# Patient Record
Sex: MALE | Race: WHITE | Employment: FULL TIME | URBAN - METROPOLITAN AREA
[De-identification: names, ages, dates, MRNs, and addresses within clinical notes are randomized per-mention and may not be internally consistent; named-entity substitution may affect disease eponyms.]

---

## 2018-06-14 PROBLEM — N49.2 CELLULITIS, SCROTUM: Status: ACTIVE | Noted: 2018-06-14

## 2018-06-14 PROBLEM — A41.9 SEPSIS (HCC): Status: ACTIVE | Noted: 2018-06-14

## 2018-06-14 PROBLEM — E11.65 HYPERGLYCEMIA DUE TO TYPE 2 DIABETES MELLITUS (HCC): Status: ACTIVE | Noted: 2018-06-14

## 2018-06-14 PROBLEM — L03.315 CELLULITIS, PERINEUM: Status: ACTIVE | Noted: 2018-06-14

## 2018-06-18 PROBLEM — E87.6 HYPOKALEMIA: Status: ACTIVE | Noted: 2018-06-18

## 2018-07-09 ENCOUNTER — OFFICE VISIT (OUTPATIENT)
Dept: FAMILY MEDICINE CLINIC | Age: 33
End: 2018-07-09

## 2018-07-09 VITALS
TEMPERATURE: 98.2 F | RESPIRATION RATE: 18 BRPM | DIASTOLIC BLOOD PRESSURE: 100 MMHG | OXYGEN SATURATION: 97 % | HEART RATE: 87 BPM | BODY MASS INDEX: 44.1 KG/M2 | SYSTOLIC BLOOD PRESSURE: 140 MMHG | WEIGHT: 315 LBS | HEIGHT: 71 IN

## 2018-07-09 DIAGNOSIS — A41.9 SEPSIS, DUE TO UNSPECIFIED ORGANISM: ICD-10-CM

## 2018-07-09 DIAGNOSIS — E11.65 TYPE 2 DIABETES MELLITUS WITH HYPERGLYCEMIA, WITHOUT LONG-TERM CURRENT USE OF INSULIN (HCC): Primary | ICD-10-CM

## 2018-07-09 DIAGNOSIS — E87.6 HYPOKALEMIA: ICD-10-CM

## 2018-07-09 DIAGNOSIS — I10 ESSENTIAL HYPERTENSION: ICD-10-CM

## 2018-07-09 DIAGNOSIS — L03.315 CELLULITIS, PERINEUM: ICD-10-CM

## 2018-07-09 DIAGNOSIS — N49.2 CELLULITIS, SCROTUM: ICD-10-CM

## 2018-07-09 DIAGNOSIS — Z79.4 TYPE 2 DIABETES MELLITUS WITH HYPERGLYCEMIA, WITH LONG-TERM CURRENT USE OF INSULIN (HCC): Primary | ICD-10-CM

## 2018-07-09 DIAGNOSIS — E11.65 TYPE 2 DIABETES MELLITUS WITH HYPERGLYCEMIA, WITH LONG-TERM CURRENT USE OF INSULIN (HCC): Primary | ICD-10-CM

## 2018-07-09 DIAGNOSIS — E11.9 DIABETES MELLITUS, TYPE 2 (HCC): ICD-10-CM

## 2018-07-09 PROBLEM — E66.01 OBESITY, MORBID (HCC): Status: ACTIVE | Noted: 2018-07-09

## 2018-07-09 RX ORDER — LISINOPRIL 10 MG/1
10 TABLET ORAL DAILY
Qty: 30 TAB | Refills: 2 | Status: SHIPPED | OUTPATIENT
Start: 2018-07-09 | End: 2018-08-07 | Stop reason: SDUPTHER

## 2018-07-09 RX ORDER — INSULIN GLARGINE 100 [IU]/ML
20 INJECTION, SOLUTION SUBCUTANEOUS
Qty: 15 ML | Refills: 3 | Status: SHIPPED | OUTPATIENT
Start: 2018-07-09 | End: 2018-09-11 | Stop reason: SDUPTHER

## 2018-07-09 RX ORDER — PEN NEEDLE, DIABETIC 30 GX3/16"
NEEDLE, DISPOSABLE MISCELLANEOUS
Qty: 50 PEN NEEDLE | Refills: 11 | Status: SHIPPED | OUTPATIENT
Start: 2018-07-09 | End: 2018-09-11 | Stop reason: SDUPTHER

## 2018-07-09 NOTE — PATIENT INSTRUCTIONS

## 2018-07-09 NOTE — PROGRESS NOTES
Chief Complaint   Patient presents with    Groin Pain    Rapid Heart Rate     Patient is here today for hospital F/U due to groin pain and rapid heart rate. 1. Have you been to the ER, urgent care clinic since your last visit? Hospitalized since your last visit? Yes [facility] to due to groin pain 6/18    2. Have you seen or consulted any other health care providers outside of the [address] [place] [address] since your last visit? Include any pap smears or colon screening.  No

## 2018-07-09 NOTE — PROGRESS NOTES
Mr. Laura Lyon is a 34y.o. year old male, he is seen today for hospital follow up he was admitted on 6/14/18, discharged on 6/26/18. Patient was admitted for the following: Richard Almazan a 34y.o. year old male who presents with  Skin infection. Patient is a very pleasant 31yo M with history of diabetes and stopped taking his meds more than 2 yrs ago. Over past week and a half he had colds, cough, took OTC meds. Over past 3 days he had pain and swelling and redness on skin in groin and scrotum. Patient presented in ER tachycardic and elevated glucose. Discharge Diagnosis:      Hospital Problems  Date Reviewed: 2/25/2016    [facility]    Codes Class Noted POA     Hypokalemia ICD-10-CM: E87.6  ICD-9-CM: 276.8   6/18/2018 No           Cellulitis, scrotum ICD-10-CM: N49.2  ICD-9-CM: 501. 4   6/14/2018 Yes           Sepsis (HCC) ICD-10-CM: A41.9  ICD-9-CM: 038.9, 995.91   6/14/2018 Yes           * (Principal)Cellulitis, perineum ICD-10-CM: L03.315  ICD-9-CM: 682. 2   6/14/2018 Yes           Hyperglycemia due to type 2 diabetes mellitus ([facility] ICD-10-CM: E11.65  ICD-9-CM: 250.00         Patient states since his discharge he does a lot of walking he will have a lot pain to surgical site. Denies any heart palpitations. Comments he has a follow up with Dr. Gudelia Dong 7/16/18. States his girlfriend has been performing his dressing changes. Comments he does have drainage, has decreased but will have increased drainage with movement. Denies odor. Comments the transitional care clinic prescribed Metformin  mg(2 tabs) for his diabetes. Denies polyuria/polydipsia. Patient was previously on Lantus when he was dx with diabetes in 2014. He had lost a significant amount of weight and lantus was discontinued and he continued on Metformin. Patient states he lost his insurance and was not able to return for his follow up appts. Comments he does check his glucose with avg. Fasting 110.       Allergies   Allergen Reactions    Tylenol Cold [Qkd-Yfzbmzxmh-Hv-Acetaminophen] Swelling     Current Outpatient Prescriptions on File Prior to Visit   Medication Sig Dispense Refill    Lactobacillus Acidoph & Bulgar (FLORANEX) 1 million cell tab tablet Take 2 Tabs by mouth two (2) times a day for 14 days. 56 Tab 0    metFORMIN (GLUCOPHAGE) 500 mg tablet Take 1 tablet by mouth two (2) times daily (with meals). 180 tablet 1    Blood-Glucose Meter monitoring kit One touch mini 1 kit 0    glucose blood VI test strips (BLOOD GLUCOSE TEST) strip Check glucose twice daily; to use with one touch mini 1 Package 11    Insulin Needles, Disposable, (SURE-FINE PEN NEEDLES) 31 x 3/16 \" ndle To use with Lantus solostar. 30 each 5     No current facility-administered medications on file prior to visit. Patient Active Problem List   Diagnosis Code    Motion sickness T75. 3XXA    Kidney stone N20.0    Diabetes mellitus, type 2 ([address] E11.9    Cellulitis, scrotum N49.2    Sepsis ([address] A41.9    Cellulitis, perineum L03.315    Hyperglycemia due to type 2 diabetes mellitus ([address] E11.65    Hypokalemia E87.6    Obesity, morbid ([address] E66.01     Past Surgical History:   Procedure Laterality Date    HX KNEE ARTHROSCOPY         Review of Systems - General ROS: negative for - chills, fatigue or fever  Respiratory ROS: no cough, shortness of breath, or wheezing  Cardiovascular ROS: no chest pain or dyspnea on exertion  Genito-Urinary ROS: no dysuria, trouble voiding, or hematuria    Lab Results   Component Value Date/Time    WBC 7.7 06/24/2018 06:01 AM    HGB 13.0 06/24/2018 06:01 AM    HCT 38.6 06/24/2018 06:01 AM    PLATELET 217 30/22/5555 06:01 AM    MCV 87.5 06/24/2018 06:01 AM     Lab Results   Component Value Date/Time    Sodium 141 06/24/2018 06:01 AM    Potassium 3.6 06/24/2018 06:01 AM    Chloride 104 06/24/2018 06:01 AM    CO2 30 06/24/2018 06:01 AM    Anion gap 7 06/24/2018 06:01 AM    Glucose 102 06/24/2018 06:01 AM    BUN 8 06/24/2018 06:01 AM Creatinine 0.9 06/24/2018 06:01 AM    BUN/Creatinine ratio 16 03/23/2015 09:17 AM    GFR est AA >60 06/24/2018 06:01 AM    GFR est non-AA >60 06/24/2018 06:01 AM    Calcium 9.3 06/24/2018 06:01 AM    Bilirubin, total 0.8 06/21/2018 03:58 AM    AST (SGOT) 28 06/21/2018 03:58 AM    Alk. phosphatase 65 06/21/2018 03:58 AM    Protein, total 6.3 (L) 06/21/2018 03:58 AM    Albumin 2.1 (L) 06/21/2018 03:58 AM    A-G Ratio 2.3 09/09/2014 12:48 PM    ALT (SGPT) 52 06/21/2018 03:58 AM     Lab Results   Component Value Date/Time    Hemoglobin A1c 11.3 (H) 06/14/2018 07:30 PM    Hemoglobin A1c (POC) 10.1 09/09/2014 12:00 PM     XR Results (maximum last 3): Results from Hospital Encounter encounter on 06/14/18   XR CHEST SNGL V   Narrative Chest    Indication: sepsis  Comparison: No relevant studies. Findings:    AP portable upright view of the chest demonstrates that the cardiac silhouette  is not enlarged. There is no focal infiltrate, pleural effusion, vascular  congestion, or pneumothorax. Impression Impression:     No acute cardiopulmonary disease.         Results from Abstract encounter on 01/17/13   XR ABD (KUB)   Impression     Encounter: 12/09/12; 12/10/12; (788.20) RETENTION OF URINE  NOS, (592.1) URETERAL CALCULUS, (598.8) URETHRAL STRICTURE NEC,  (788.29) RETENTION OF URINE NEC, (599.4) URETHRAL FALSE PASSAGE,  (278.01) MORBID OBESITY; D - POLLO; Tamra Walker; DIS 12/10/12  11:3508/17/12; 08/22/12; Jorge Brandon, [address] [place] [address]; DIS 08/22/12  00:0808/14/06; 08/14/06; ; D; ; DIS 08/14/06 17:50     ([facility]) Eli Moses last refresh: 10:00          Patient Name     Room/Bed     Allergies     Weight(kg)     Height(in)    BMI     Isolation     Code Status     Primary  Language      NAYELI YU     DIS 12/10/12 11:35     TYLENOL COLD RELIEF      163.3kg     71in     50.2                 English                Radiology One Year last refresh: 10:00         Report for Del Tinsley (MRN: [id number]) Select All Tests      Check 10 on All Selected         -----------------------------------------------------------------  ---------------        Check 10 on Selected Components for ABDOMEN AP ONLY  (KUB,FLAT PLT)      TEST: ABDOMEN AP ONLY (KUB,FLAT PLT)       Collected Date & Time: 08/17/12 18:28               Result Name Results Units Reference Range       ABDOMEN AP ONLY (KUB,FLAT PLT)   RAD 4000 - ABDOMEN AP ONLY  (KU(..)            RAD 4000 - ABDOMEN AP ONLY (KUB,FLAT PLT)   -  Aug 17 2012     RESULTS:  CLINICAL HISTORY:  Right renal colic    EXAMINATION: Single view of the abdomen    CORRELATION:  CT scan 8/17/2012. FINDINGS:  Scattered air and fecal residue is seen in the colon. There are  no   dilated loops of small or large bowel. Mid to distal ureteric  stone seen   on CT scan is not clearly identified on the KUB. IMPRESSION:    [id number] ureteric stone seen on CT scan not clearly  identified on   the KUB. SMP/thang    Interpreting Physician: Piper Odom M.D. Electronically Signed  by / Date: Piper Odom M.D. Aug 17 2012  6:49P                      Results from Hospital Encounter encounter on 12/09/12   XR FLUORO GUIDE NDL PLACE   Narrative RAD 6000 - FLUOR<1 HOUR-RAD - DEC 10 2012  RESULTS:  FLUOROSCOPY IN OR  10 DECEMBER 2012. PROCEDURE:  30 SECONDS OF FLUOROSCOPY TIME WAS UTILIZED IN PERFORMING CYSTOSCOPY. NOTE: \"MD FOUND STONE IN BLADDER OBSTRUCTING URETHRA  STONE REMOVED. \"  SOLEDAD/JORDAN  IMPRESSION:  FLUOROSCOPY IN OR  10 DECEMBER 2012. PROCEDURE:  30 SECONDS OF FLUOROSCOPY TIME WAS UTILIZED IN PERFORMING CYSTOSCOPY. NOTE: \"MD FOUND STONE IN BLADDER OBSTRUCTING URETHRA  STONE REMOVED. \"  WILL  INTERPRETING PHYSICIAN: Kevin Posada M.D.  ELECTRONICALLY SIGNED  BY / DATE: Kevin Posada M.D. 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 COMPARISON: No relevant studies. TECHNIQUE:  CT of the pelvis was performed with intravenous contrast with  multiplanar reformatted images. FINDINGS:    Visualized portions of the bowel are unremarkable. Suggestion of diffuse  circumferential bladder wall thickening. The prostate is not enlarged. There is  a fat-containing right inguinal hernia. There is marked fat stranding and  induration involving the right inguinal region extending into the right scrotum. No definite abscess. No soft tissue gas. Impression IMPRESSION:    1. Inflammatory changes within the right inguinal region extending into the  right hemiscrotum. No definite abscess or soft tissue gas. 2.  Fat-containing right inguinal hernia. Results from Abstract encounter on 01/17/13   CT ABD PELV WO CONT   Impression Cindi Almazan; [id number] ; DIS 12/10/12 11:35     Encounter:  12/09/12; 12/10/12; (788.20) RETENTION OF URINE NOS, (592.1)  URETERAL CALCULUS, (598.8) URETHRAL STRICTURE NEC, (788.29)  RETENTION OF URINE NEC, (599.4) URETHRAL FALSE PASSAGE, (278.01)  MORBID OBESITY; D - POLLO; Larrie Kocher; DIS 12/10/12 11:3508/17/12;  08/22/12; Harjinder Gomez, [address] [place] [address]; DIS 08/22/12 00:0808/14/06;  08/14/06; ; D; ; DIS 08/14/06 17:50     ([facility]) Frida Cole last refresh: 9:58          Patient Name     Room/Bed     Allergies     Weight(kg)     Height(in)    BMI     Isolation     Code Status     Primary  Language      NAYELI YU     DIS 12/10/12 11:35     TYLENOL COLD RELIEF      163.3kg     71in     50.2                 English                Radiology One Year last refresh: 9:58         Report for Cindi Almazan (MRN: [id number])                         Select All Tests      Check 10 on All Selected         -----------------------------------------------------------------  ---------------        Check 10 on Selected Components for CT ABDOMEN/PELVIS W/O  CONTRAST      TEST: CT ABDOMEN/PELVIS W/O CONTRAST       Collected Date & Time: 08/17/12 17:05               Result Name Results Units Reference Range       CT ABDOMEN/PELVIS W/O CONTRAST   CAT 7160 - CT  ABDOMEN/PELVIS W(..)            CAT 7160 - CT ABDOMEN/PELVIS W/O CONTRAST   -  Aug 17 2012     RESULTS:  Clinical History: Right flank pain    Examination:   Noncontrast CT scan of the abdomen and pelvis 8 17 2012. 5 mm  axial   scanning is performed from the costophrenic angles to the  symphysis   pubis. Coronal and sagittal reconstruction imaging have been  performed. Correlation: None    Findings:  2 mm nodule left lower lobe. Mild degenerative changes of the  spine. Liver, gallbladder, spleen, pancreas and adrenal glands  unremarkable. 6 mm obstructing stone right mid to distal ureter, moderate   hydronephrosis and perirenal fat stranding. Circumferential  bladder wall   thickening is likely related to underdistention. Prostatic  calcifications   are present. Seminal vesicles are within normal limits. No stones  are   seen in the left kidney. Colon, terminal ileum, appendix, stomach and small bowel loops  are within   normal limits. Abdominal aorta and iliac vessels are  unremarkable. No   dominant enlargement or free fluid. IMPRESSION:    6 mm obstructing stone mid to distal right ureter, moderate   hydronephrosis. Interpreting Physician: Tony Madsen M.D. Electronically Signed  by / Date: Tony Madsen M.D. Aug 17 2012  5:47P                      Results from Hospital Encounter encounter on 08/17/12   CT ABD PELV WO CONT   Narrative CAT 7160 - CT ABDOMEN/PELVIS W/O CONTRAST   -  AUG 17 2012  RESULTS:  CLINICAL HISTORY: RIGHT FLANK PAIN  EXAMINATION:  NONCONTRAST CT SCAN OF THE ABDOMEN AND PELVIS 8 17 2012. 5 MM AXIAL  SCANNING IS PERFORMED FROM THE COSTOPHRENIC ANGLES TO THE SYMPHYSIS  PUBIS. CORONAL AND SAGITTAL RECONSTRUCTION IMAGING HAVE BEEN PERFORMED.   CORRELATION: NONE  FINDINGS:  2 MM NODULE LEFT LOWER LOBE. MILD DEGENERATIVE CHANGES OF THE SPINE. LIVER  GALLBLADDER  SPLEEN  PANCREAS AND ADRENAL GLANDS UNREMARKABLE.  6 MM OBSTRUCTING STONE RIGHT MID TO DISTAL URETER  MODERATE  HYDRONEPHROSIS AND PERIRENAL FAT STRANDING. CIRCUMFERENTIAL BLADDER WALL  THICKENING IS LIKELY RELATED TO UNDERDISTENTION. PROSTATIC CALCIFICATIONS  ARE PRESENT. SEMINAL VESICLES ARE WITHIN NORMAL LIMITS. NO STONES ARE  SEEN IN THE LEFT KIDNEY. COLON  TERMINAL ILEUM  APPENDIX  STOMACH AND SMALL BOWEL LOOPS ARE WITHIN  NORMAL LIMITS. ABDOMINAL AORTA AND ILIAC VESSELS ARE UNREMARKABLE. NO  DOMINANT ENLARGEMENT OR FREE FLUID. IMPRESSION:  6 MM OBSTRUCTING STONE MID TO DISTAL RIGHT URETER  MODERATE  HYDRONEPHROSIS. INTERPRETING PHYSICIAN: Hector Galeana M.D.  ELECTRONICALLY SIGNED  BY / DATE: Hector Galeana M.D. AUG 17 2012  5:47P        MRI Results (maximum last 3): No results found for this or any previous visit. US Results (maximum last 3): Results from Hospital Encounter encounter on 06/14/18   US SCROTUM/TESTICLES   Narrative Clinical history: Scrotal mass, enlarged scrotum    EXAMINATION:  Scrotal ultrasound with color duplex interrogation and spectral waveform  analysis 6/18/2018. FINDINGS:  There is diffuse scrotal wall edema. Both testicles demonstrate homogeneous  echotexture. Right testicle measures 5.3 x 2.9 x 3.1 cm. Left testicle measures  4.6 x 2.8 x 3 cm. On color-flow duplex interrogation there is blood flow to both  testicles. Right epididymis measures 1 x 1.4 cm. Left epididymis measures 0.9 x 1.4 cm. Small bilateral hydroceles. Ill-defined 2.6 x 1.3 x 2.6 cm hypoechoic area right  scrotal wall may represent phlegmon. Impression IMPRESSION:  1. No scrotal mass. 2. Diffuse scrotal wall edema with skin thickening. 3. Small bilateral pleural effusions. 4. 2.6 cm hypoechoic area subcutaneous tissues right scrotum may represent  phlegmon.           IR Results (maximum last 3): No results found for this or any previous visit. BP Readings from Last 3 Encounters:   07/09/18 (!) 140/100   06/25/18 135/82   02/25/16 132/88     Wt Readings from Last 3 Encounters:   07/09/18 347 lb (157.4 kg)   06/14/18 340 lb (154.2 kg)   02/25/16 322 lb (146.1 kg)       Objective:   Visit Vitals    BP (!) 140/100 (BP 1 Location: Right arm, BP Patient Position: Sitting)    Pulse 87    Temp 98.2 °F (36.8 °C) (Oral)    Resp 18    Ht 5' 11\" (1.803 m)    Wt 347 lb (157.4 kg)    SpO2 97%    BMI 48.4 kg/m2     General appearance: alert, cooperative, no distress, appears stated age  Lungs: clear to auscultation bilaterally  Heart: regular rate and rhythm, S1, S2 normal, no murmur, click, rub or gallop  Abdomen: soft, non-tender. Bowel sounds normal. No masses,  no organomegaly  Male genitalia: penis: no lesions or discharge, abnormal findings: scrotum enlarged and erythematous  Extremities: extremities normal, atraumatic, no cyanosis or edema  Pulses: 2+ and symmetric  Skin: right groin: superior incision with packing with small of amount of bloody drainage present, inferior incision also with packing and scant amount of bloody drainage     Assessment/Plan:    ICD-10-CM ICD-9-CM    1. Type 2 diabetes mellitus with hyperglycemia, without long-term current use of insulin ([facility]) E11.65 250.00 insulin glargine (LANTUS,BASAGLAR) 100 unit/mL (3 mL) inpn     790.29 Insulin Needles, Disposable, 31 gauge x 5/16\" ndle      LIPID PANEL      MICROALBUMIN, UR, RAND W/ MICROALB/CREAT RATIO      LIPID PANEL      MICROALBUMIN, UR, RAND W/ MICROALB/CREAT RATIO   2. Sepsis, due to unspecified organism ([facility] A41.9 038.9 CBC WITH AUTOMATED DIFF     995.91 CBC WITH AUTOMATED DIFF   3. Cellulitis, scrotum N49.2 608.4 CBC WITH AUTOMATED DIFF      CBC WITH AUTOMATED DIFF   4. Cellulitis, perineum L03.315 682.2 CBC WITH AUTOMATED DIFF      CBC WITH AUTOMATED DIFF   5.  Hypokalemia X21.7 674.6 METABOLIC PANEL, COMPREHENSIVE      METABOLIC PANEL, COMPREHENSIVE   6. Essential hypertension I10 401.9 lisinopril (PRINIVIL, ZESTRIL) 10 mg tablet      LIPID PANEL      LIPID PANEL   anticipatory guidance for above provided and reviewed  I have discussed the diagnosis with the patient and the intended plan as seen in the above orders. The patient has received an after-visit summary and questions were answered concerning future plans. I have discussed medication side effects and warnings with the patient as well. Follow-up Disposition:  Return in about 4 weeks (around 8/6/2018) for DM.

## 2018-07-09 NOTE — TELEPHONE ENCOUNTER
From: Priscilla Aguilar  To: Elyssa hKan NP  Sent: 7/9/2018 2:41 PM EDT  Subject: Medication Renewal Request    Original authorizing provider: SANJUANITA Maya People would like a refill of the following medications:  glucose blood VI test strips (BLOOD GLUCOSE TEST) strip Elyssa Khan NP]    Preferred pharmacy: [address] [place] [address]     Comment:  I have a freestyle kit with like 5 strips left.  Also have a reli on that was just given to me

## 2018-07-09 NOTE — MR AVS SNAPSHOT
Romulo Ego 
 
 
 [address] [id number] [address] [facility] [address] 
[phone] Patient: Zita Fine MRN:  [id number] Visit Information Date & Time Provider Department Dept. Phone Encounter #  
 7/9/2018  8:15 AM Janet Beal NP Aundrea Jennings [address] [place] [address] [phone] [id number] Follow-up Instructions Return in about 4 weeks (around 8/6/2018) for DM. Your Appointments 7/16/2018  1:00 PM  
New Patient with Rayna James MD  
[facility] [address] [facility]) Appt Note: 2-3 wk hosp [facility]) f/u s/p I and D scrotal abscess per Dr Mireille Berg [address] [place], [place] [id number]  
[phone]  
  
   
 [address] [id number] Upcoming Health Maintenance Date Due  
 FOOT EXAM Q1 9/9/2015 MICROALBUMIN Q1 9/9/2015 LIPID PANEL Q1 9/25/2015 EYE EXAM RETINAL OR DILATED Q1 9/30/2015 Pneumococcal 19-64 Medium Risk (1 of 1 - PPSV23) 10/3/2018* Influenza Age 5 to Adult 8/1/2018 HEMOGLOBIN A1C Q6M 12/14/2018 DTaP/Tdap/Td series (2 - Td) 11/22/2024 *Topic was postponed. The date shown is not the original due date. Allergies as of 7/9/2018  Review Complete On: 7/9/2018 By: Janet Beal NP Severity Noted Reaction Type Reactions Tylenol Cold [Mad-fanmepzfp-qn-acetaminophen]  09/25/2012    Swelling Current Immunizations  Reviewed on 12/22/2014 Name Date Tdap 11/22/2014 Not reviewed this visit You Were Diagnosed With   
  
 Codes Comments Type 2 diabetes mellitus with hyperglycemia, without long-term current use of insulin (HCC)    -  Primary ICD-10-CM: E11.65 ICD-9-CM: 250.00, 790.29 Sepsis, due to unspecified organism [facility])     ICD-10-CM: A41.9 ICD-9-CM: 038.9, 995.91 Cellulitis, scrotum     ICD-10-CM: N49.2 ICD-9-CM: 608.4 Cellulitis, perineum     ICD-10-CM: K15.337 ICD-9-CM: 682.2 Hypokalemia     ICD-10-CM: E87.6 ICD-9-CM: 276.8 Essential hypertension     ICD-10-CM: I10 
ICD-9-CM: 401.9 Vitals BP Pulse Temp Resp Height(growth percentile) Weight(growth percentile) (!) 140/100 (BP 1 Location: Right arm, BP Patient Position: Sitting) 87 98.2 °F (36.8 °C) (Oral) 18 5' 11\" (1.803 m) 347 lb (157.4 kg) SpO2 BMI Smoking Status 97% 48.4 kg/m2 Never Smoker Vitals History BMI and BSA Data Body Mass Index Body Surface Area  
 48.4 kg/m 2 2.81 m 2 Preferred Pharmacy Pharmacy Name Phone [address], [address] [place] [address] Rd [phone] Your Updated Medication List  
  
   
This list is accurate as of 18  9:07 AM.  Always use your most recent med list.  
  
  
  
  
 Blood-Glucose Meter monitoring kit One touch mini  
  
 glucose blood VI test strips strip Commonly known as:  blood glucose test  
Check glucose twice daily; to use with one touch mini  
  
 insulin glargine 100 unit/mL (3 mL) Inpn Commonly known as:  LANTUS,BASAGLAR  
20 Units by SubCUTAneous route nightly. Insulin Needles (Disposable) 31 gauge x 3/16\" Ndle Commonly known as:  SURE-FINE PEN NEEDLES To use with Lantus solostar. Insulin Needles (Disposable) 31 gauge x 5/16\" Ndle To use with lantus solostar Lactobacillus Acidoph & Bulgar 1 million cell Tab tablet Commonly known as:  Brendolyn Albion Take 2 Tabs by mouth two (2) times a day for 14 days. lisinopril 10 mg tablet Commonly known as:  Jackjulissann Pares Take 1 Tab by mouth daily. metFORMIN 500 mg tablet Commonly known as:  GLUCOPHAGE Take 1 tablet by mouth two (2) times daily (with meals). Prescriptions Sent to Pharmacy Refills  
 insulin glargine (LANTUS,BASAGLAR) 100 unit/mL (3 mL) inpn 3 Si Units by SubCUTAneous route nightly.   
 Class: Normal  
 Pharmacy: [address] [facility] [place] [facility] Ph #: [phone] Route: SubCUTAneous Insulin Needles, Disposable, 31 gauge x 5/16\" ndle 11 Sig: To use with lantus solostar Class: Normal  
 Pharmacy: [facility] [address] LIZZY MARQUEZ [address] [facility]  Ph #: [phone]  
 lisinopril (PRINIVIL, ZESTRIL) 10 mg tablet 2 Sig: Take 1 Tab by mouth daily. Class: Normal  
 Pharmacy: [facility] DR LIZZY MARQUEZ [address] [place] [address], [address] [place] [address] Ph #: [phone] Route: Oral  
  
Follow-up Instructions Return in about 4 weeks (around 8/6/2018) for DM. To-Do List   
 07/09/2018 Lab:  CBC WITH AUTOMATED DIFF   
  
 07/09/2018 Lab:  LIPID PANEL   
  
 07/09/2018 Lab:  METABOLIC PANEL, COMPREHENSIVE   
  
 07/09/2018 Lab:  MICROALBUMIN, UR, RAND W/ MICROALB/CREAT RATIO Patient Instructions Learning About Diabetes Food Guidelines Your Care Instructions Meal planning is important to manage diabetes. It helps keep your blood sugar at a target level (which you set with your doctor). You don't have to eat special foods. You can eat what your family eats, including sweets once in a while. But you do have to pay attention to how often you eat and how much you eat of certain foods. You may want to work with a dietitian or a certified diabetes educator (CDE) to help you plan meals and snacks. A dietitian or CDE can also help you lose weight if that is one of your goals. What should you know about eating carbs? Managing the amount of carbohydrate (carbs) you eat is an important part of healthy meals when you have diabetes. Carbohydrate is found in many foods. · Learn which foods have carbs. And learn the amounts of carbs in different foods. ¨ Bread, cereal, pasta, and rice have about 15 grams of carbs in a serving. A serving is 1 slice of bread (1 ounce), ½ cup of cooked cereal, or 1/3 cup of cooked pasta or rice. ¨ Fruits have 15 grams of carbs in a serving.  A serving is 1 small fresh fruit, such as an apple or orange; ½ of a banana; ½ cup of cooked or canned fruit; ½ cup of fruit juice; 1 cup of melon or raspberries; or 2 tablespoons of dried fruit. ¨ Milk and no-sugar-added yogurt have 15 grams of carbs in a serving. A serving is 1 cup of milk or 2/3 cup of no-sugar-added yogurt. ¨ Starchy vegetables have 15 grams of carbs in a serving. A serving is ½ cup of mashed potatoes or sweet potato; 1 cup winter squash; ½ of a small baked potato; ½ cup of cooked beans; or ½ cup cooked corn or green peas. · Learn how much carbs to eat each day and at each meal. A dietitian or CDE can teach you how to keep track of the amount of carbs you eat. This is called carbohydrate counting. · If you are not sure how to count carbohydrate grams, use the Plate Method to plan meals. It is a good, quick way to make sure that you have a balanced meal. It also helps you spread carbs throughout the day. ¨ Divide your plate by types of foods. Put non-starchy vegetables on half the plate, meat or other protein food on one-quarter of the plate, and a grain or starchy vegetable in the final quarter of the plate. To this you can add a small piece of fruit and 1 cup of milk or yogurt, depending on how many carbs you are supposed to eat at a meal. 
· Try to eat about the same amount of carbs at each meal. Do not \"save up\" your daily allowance of carbs to eat at one meal. 
· Proteins have very little or no carbs per serving. Examples of proteins are beef, chicken, turkey, fish, eggs, tofu, cheese, cottage cheese, and peanut butter. A serving size of meat is 3 ounces, which is about the size of a deck of cards. Examples of meat substitute serving sizes (equal to 1 ounce of meat) are 1/4 cup of cottage cheese, 1 egg, 1 tablespoon of peanut butter, and ½ cup of tofu. How can you eat out and still eat healthy? · Learn to estimate the serving sizes of foods that have carbohydrate.  If you measure food at home, it will be easier to estimate the amount in a serving of restaurant food. · If the meal you order has too much carbohydrate (such as potatoes, corn, or baked beans), ask to have a low-carbohydrate food instead. Ask for a salad or green vegetables. · If you use insulin, check your blood sugar before and after eating out to help you plan how much to eat in the future. · If you eat more carbohydrate at a meal than you had planned, take a walk or do other exercise. This will help lower your blood sugar. What else should you know? · Limit saturated fat, such as the fat from meat and dairy products. This is a healthy choice because people who have diabetes are at higher risk of heart disease. So choose lean cuts of meat and nonfat or low-fat dairy products. Use olive or canola oil instead of butter or shortening when cooking. · Don't skip meals. Your blood sugar may drop too low if you skip meals and take insulin or certain medicines for diabetes. · Check with your doctor before you drink alcohol. Alcohol can cause your blood sugar to drop too low. Alcohol can also cause a bad reaction if you take certain diabetes medicines. Follow-up care is a key part of your treatment and safety. Be sure to make and go to all appointments, and call your doctor if you are having problems. It's also a good idea to know your test results and keep a list of the medicines you take. Where can you learn more? Go to http://ermelinda-yrn.info/. Enter N405 in the search box to learn more about \"Learning About Diabetes Food Guidelines. \" Current as of: March 13, 2017 Content Version: 11.4 © 6204-5049 [employer]. Care instructions adapted under license by [employer] (which disclaims liability or warranty for this information).  If you have questions about a medical condition or this instruction, always ask your healthcare professional. [employer] disclaims any warranty or liability for your use of this information. Introducing [facility]! Dear Anderson Castañeda: Thank you for requesting a [employer] account. Our records indicate that you already have an active [employer] account. You can access your account anytime at [email or web address] Did you know that you can access your hospital and ER discharge instructions at any time in [employer]? You can also review all of your test results from your hospital stay or ER visit. Additional Information If you have questions, please visit the Frequently Asked Questions section of the [employer] website at [email or web address] Remember, [employer] is NOT to be used for urgent needs. For medical emergencies, dial 911. Now available from your iPhone and Android! Please provide this summary of care documentation to your next provider. If you have any questions after today's visit, please call [phone].

## 2018-07-10 LAB
A-G RATIO,AGRAT: 1.8 RATIO (ref 1.1–2.6)
ABSOLUTE LYMPHOCYTE COUNT, 10803: 1.9 K/UL (ref 1–4.8)
ALBUMIN SERPL-MCNC: 4.4 G/DL (ref 3.5–5)
ALP SERPL-CCNC: 73 U/L (ref 25–115)
ALT SERPL-CCNC: 20 U/L (ref 5–40)
ANION GAP SERPL CALC-SCNC: 16 MMOL/L
AST SERPL W P-5'-P-CCNC: 14 U/L (ref 10–37)
BASOPHILS # BLD: 0 K/UL (ref 0–0.2)
BASOPHILS NFR BLD: 1 % (ref 0–2)
BILIRUB SERPL-MCNC: 0.5 MG/DL (ref 0.2–1.2)
BUN SERPL-MCNC: 11 MG/DL (ref 6–22)
CALCIUM SERPL-MCNC: 9.6 MG/DL (ref 8.4–10.4)
CHLORIDE SERPL-SCNC: 100 MMOL/L (ref 98–110)
CHOLEST SERPL-MCNC: 187 MG/DL (ref 110–200)
CO2 SERPL-SCNC: 25 MMOL/L (ref 20–32)
CREAT SERPL-MCNC: 0.7 MG/DL (ref 0.5–1.2)
CREATININE, URINE: 118 MG/DL
EOSINOPHIL # BLD: 0.2 K/UL (ref 0–0.5)
EOSINOPHIL NFR BLD: 3 % (ref 0–6)
ERYTHROCYTE [DISTWIDTH] IN BLOOD BY AUTOMATED COUNT: 13.9 % (ref 10–15.5)
GFRAA, 66117: >60
GFRNA, 66118: >60
GLOBULIN,GLOB: 2.5 G/DL (ref 2–4)
GLUCOSE SERPL-MCNC: 112 MG/DL (ref 70–99)
GRANULOCYTES,GRANS: 62 % (ref 40–75)
HCT VFR BLD AUTO: 45 % (ref 36.6–51.9)
HDLC SERPL-MCNC: 34 MG/DL (ref 40–59)
HDLC SERPL-MCNC: 5.5 MG/DL (ref 0–5)
HGB BLD-MCNC: 15.1 G/DL (ref 13.2–17.3)
LDLC SERPL CALC-MCNC: 116 MG/DL (ref 50–99)
LYMPHOCYTES, LYMLT: 29 % (ref 20–45)
MCH RBC QN AUTO: 30 PG (ref 26–34)
MCHC RBC AUTO-ENTMCNC: 34 G/DL (ref 31–36)
MCV RBC AUTO: 89 FL (ref 80–95)
MICROALB/CREAT RATIO, 140286: 67.3 MCG/MG OF CREATININE (ref 0–30)
MICROALBUMIN,URINE RANDOM 140054: 79.4 UG/ML (ref 0.1–17)
MONOCYTES # BLD: 0.4 K/UL (ref 0.1–1)
MONOCYTES NFR BLD: 6 % (ref 3–12)
NEUTROPHILS # BLD AUTO: 4.1 K/UL (ref 1.8–7.7)
PLATELET # BLD AUTO: 291 K/UL (ref 140–440)
PMV BLD AUTO: 10.7 FL (ref 9–13)
POTASSIUM SERPL-SCNC: 4.2 MMOL/L (ref 3.5–5.5)
PROT SERPL-MCNC: 6.9 G/DL (ref 6.4–8.3)
RBC # BLD AUTO: 5.05 M/UL (ref 3.8–5.8)
SODIUM SERPL-SCNC: 141 MMOL/L (ref 133–145)
TRIGL SERPL-MCNC: 186 MG/DL (ref 40–149)
VLDLC SERPL CALC-MCNC: 37 MG/DL (ref 8–30)
WBC # BLD AUTO: 6.6 K/UL (ref 4–11)

## 2018-07-16 PROBLEM — E11.21 TYPE 2 DIABETES WITH NEPHROPATHY (HCC): Status: ACTIVE | Noted: 2018-07-16

## 2018-07-25 DIAGNOSIS — E11.65 TYPE 2 DIABETES MELLITUS WITH HYPERGLYCEMIA, UNSPECIFIED WHETHER LONG TERM INSULIN USE (HCC): Primary | ICD-10-CM

## 2018-08-01 ENCOUNTER — PATIENT MESSAGE (OUTPATIENT)
Dept: FAMILY MEDICINE CLINIC | Age: 33
End: 2018-08-01

## 2018-08-02 NOTE — TELEPHONE ENCOUNTER
From: Latonya Cortes  To: Cindy Carballo NP  Sent: 8/1/2018 3:50 PM EDT  Subject: Non-Urgent Medical Question    Yes I need some help please my disability insurance company told me they are trying to get info about my Injury. They need to know that it was not a pre condition I had I tried to explain to them how I got a cut working On a fence in my yard and they need it to come from you.  They said they faxed over papers of not please let me know as they have been dragging this out since it happen please and thank you so much

## 2018-08-07 ENCOUNTER — OFFICE VISIT (OUTPATIENT)
Dept: FAMILY MEDICINE CLINIC | Age: 33
End: 2018-08-07

## 2018-08-07 VITALS
WEIGHT: 315 LBS | SYSTOLIC BLOOD PRESSURE: 159 MMHG | RESPIRATION RATE: 20 BRPM | HEIGHT: 71 IN | DIASTOLIC BLOOD PRESSURE: 87 MMHG | OXYGEN SATURATION: 97 % | HEART RATE: 90 BPM | BODY MASS INDEX: 44.1 KG/M2 | TEMPERATURE: 99.1 F

## 2018-08-07 DIAGNOSIS — I10 ESSENTIAL HYPERTENSION: ICD-10-CM

## 2018-08-07 DIAGNOSIS — E66.01 OBESITY, MORBID (HCC): ICD-10-CM

## 2018-08-07 DIAGNOSIS — E11.65 TYPE 2 DIABETES MELLITUS WITH HYPERGLYCEMIA, WITHOUT LONG-TERM CURRENT USE OF INSULIN (HCC): Primary | ICD-10-CM

## 2018-08-07 RX ORDER — LISINOPRIL 20 MG/1
20 TABLET ORAL DAILY
Qty: 30 TAB | Refills: 3 | Status: SHIPPED | OUTPATIENT
Start: 2018-08-07 | End: 2018-09-11 | Stop reason: SDUPTHER

## 2018-08-07 RX ORDER — METFORMIN HYDROCHLORIDE 500 MG/1
1000 TABLET, FILM COATED, EXTENDED RELEASE ORAL DAILY
COMMUNITY
End: 2018-09-07 | Stop reason: SDUPTHER

## 2018-08-07 NOTE — PATIENT INSTRUCTIONS
Diabetes Foot Health: Care Instructions  Your Care Instructions    When you have diabetes, your feet need extra care and attention. Diabetes can damage the nerve endings and blood vessels in your feet, making you less likely to notice when your feet are injured. Diabetes also limits your body's ability to fight infection and get blood to areas that need it. If you get a minor foot injury, it could become an ulcer or a serious infection. With good foot care, you can prevent most of these problems. Caring for your feet can be quick and easy. Most of the care can be done when you are bathing or getting ready for bed. Follow-up care is a key part of your treatment and safety. Be sure to make and go to all appointments, and call your doctor if you are having problems. It's also a good idea to know your test results and keep a list of the medicines you take. How can you care for yourself at home? · Keep your blood sugar close to normal by watching what and how much you eat, monitoring blood sugar, taking medicines if prescribed, and getting regular exercise. · Do not smoke. Smoking affects blood flow and can make foot problems worse. If you need help quitting, talk to your doctor about stop-smoking programs and medicines. These can increase your chances of quitting for good. · Eat a diet that is low in fats. High fat intake can cause fat to build up in your blood vessels and decrease blood flow. · Inspect your feet daily for blisters, cuts, cracks, or sores. If you cannot see well, use a mirror or have someone help you. · Take care of your feet:  [facility] your feet every day. Use warm (not hot) water. Check the water temperature with your wrists or other part of your body, not your feet. ¨ Dry your feet well. Pat them dry. Do not rub the skin on your feet too hard. Dry well between your toes. If the skin on your feet stays moist, bacteria or a fungus can grow, which can lead to infection.   ¨ Keep your skin soft. Use moisturizing skin cream to keep the skin on your feet soft and prevent calluses and cracks. But do not put the cream between your toes, and stop using any cream that causes a rash. ¨ Clean underneath your toenails carefully. Do not use a sharp object to clean underneath your toenails. Use the blunt end of a nail file or other rounded tool. ¨ Trim and file your toenails straight across to prevent ingrown toenails. Use a nail clipper, not scissors. Use an emery board to smooth the edges. · Change socks daily. Socks without seams are best, because seams often rub the feet. You can find socks for people with diabetes from specialty catalogs. · Look inside your shoes every day for things like gravel or torn linings, which could cause blisters or sores. · Buy shoes that fit well:  ¨ Look for shoes that have plenty of space around the toes. This helps prevent bunions and blisters. ¨ Try on shoes while wearing the kind of socks you will usually wear with the shoes. ¨ Avoid plastic shoes. They may rub your feet and cause blisters. Good shoes should be made of materials that are flexible and breathable, such as leather or cloth. ¨ Break in new shoes slowly by wearing them for no more than an hour a day for several days. Take extra time to check your feet for red areas, blisters, or other problems after you wear new shoes. · Do not go barefoot. Do not wear sandals, and do not wear shoes with very thin soles. Thin soles are easy to puncture. They also do not protect your feet from hot pavement or cold weather. · Have your doctor check your feet during each visit. If you have a foot problem, see your doctor. Do not try to treat an early foot problem at home. Home remedies or treatments that you can buy without a prescription (such as corn removers) can be harmful. · Always get early treatment for foot problems. A minor irritation can lead to a major problem if not properly cared for early.   When should you call for help? Call your doctor now or seek immediate medical care if:    · You have a foot sore, an ulcer or break in the skin that is not healing after 4 days, bleeding corns or calluses, or an ingrown toenail.     · You have blue or black areas, which can mean bruising or blood flow problems.     · You have peeling skin or tiny blisters between your toes or cracking or oozing of the skin.     · You have a fever for more than 24 hours and a foot sore.     · You have new numbness or tingling in your feet that does not go away after you move your feet or change positions.     · You have unexplained or unusual swelling of the foot or ankle.    Watch closely for changes in your health, and be sure to contact your doctor if:    · You cannot do proper foot care. Where can you learn more? Go to http://ermelinda-yrn.info/. Enter A739 in the search box to learn more about \"Diabetes Foot Health: Care Instructions. \"  Current as of: December 7, 2017  Content Version: 11.7  © 4753-8253 [employer]. Care instructions adapted under license by [employer] (which disclaims liability or warranty for this information). If you have questions about a medical condition or this instruction, always ask your healthcare professional. [address] any warranty or liability for your use of this information.

## 2018-08-07 NOTE — MR AVS SNAPSHOT
[address] [facility], [place] 
 
 
 [address] [address] [address] [facility] [address] 
[phone] Patient: Valerio Cooley MRN:  [id number] Visit Information Date & Time Provider Department Dept. Phone Encounter #  
 8/7/2018  7:45 AM Francy Bush NP [address] [phone] [id number] Follow-up Instructions Return in about 3 months (around 11/7/2018) for DM/HTN. Upcoming Health Maintenance Date Due  
 FOOT EXAM Q1 9/9/2015 EYE EXAM RETINAL OR DILATED Q1 9/30/2015 Influenza Age 5 to Adult 8/1/2018 Pneumococcal 19-64 Medium Risk (1 of 1 - PPSV23) 10/3/2018* HEMOGLOBIN A1C Q6M 12/14/2018 MICROALBUMIN Q1 7/9/2019 LIPID PANEL Q1 7/9/2019 DTaP/Tdap/Td series (2 - Td) 11/22/2024 *Topic was postponed. The date shown is not the original due date. Allergies as of 8/7/2018  Review Complete On: 8/7/2018 By: Francy Bush NP Severity Noted Reaction Type Reactions Tylenol Cold [Ncj-gobnhcmid-dw-acetaminophen]  09/25/2012    Swelling Current Immunizations  Reviewed on 12/22/2014 Name Date Tdap 11/22/2014 Not reviewed this visit You Were Diagnosed With   
  
 Codes Comments Type 2 diabetes mellitus with hyperglycemia, without long-term current use of insulin (HCC)    -  Primary ICD-10-CM: E11.65 ICD-9-CM: 250.00, 790.29 Obesity, morbid ([facility] [address]     ICD-10-CM: E66.01 
ICD-9-CM: 278.01 Essential hypertension     ICD-10-CM: I10 
ICD-9-CM: 401.9 Vitals BP Pulse Temp Resp Height(growth percentile) Weight(growth percentile) (!) 159/94 (BP 1 Location: Left arm, BP Patient Position: Sitting) 90 99.1 °F (37.3 °C) (Oral) 20 5' 11\" (1.803 m) 346 lb (156.9 kg) SpO2 BMI Smoking Status 97% 48.26 kg/m2 Never Smoker BMI and BSA Data Body Mass Index Body Surface Area  
 48.26 kg/m 2 2.8 m 2 Preferred Pharmacy Pharmacy Name Phone [address] [phone] Your Updated Medication List  
  
   
This list is accurate as of 8/7/18  8:37 AM.  Always use your most recent med list.  
  
  
  
  
 Blood-Glucose Meter monitoring kit One touch mini Blood-Glucose Transmitter Renda Sandhoff Commonly known as:  DEXCOM G6 TRANSMITTER Check glucose 3 times per day  
  
 glucose blood VI test strips strip Commonly known as:  blood glucose test  
Check glucose twice daily; to use with freestyle meter  
  
 insulin glargine 100 unit/mL (3 mL) Inpn Commonly known as:  LANTUS,BASAGLAR  
20 Units by SubCUTAneous route nightly. Insulin Needles (Disposable) 31 gauge x 3/16\" Ndle Commonly known as:  SURE-FINE PEN NEEDLES To use with Lantus solostar. Insulin Needles (Disposable) 31 gauge x 5/16\" Ndle To use with lantus solostar  
  
 lisinopril 10 mg tablet Commonly known as:  Mollie Ripa Take 1 Tab by mouth daily. metFORMIN 500 mg Tg24 24 hour tablet Commonly known Tomas Rodriguez [facility] Take 1,000 mg by mouth daily. We Performed the Following  DIABETES FOOT EXAM [HM7 Custom] REFERRAL TO OPHTHALMOLOGY [REF57 Custom] Follow-up Instructions Return in about 3 months (around 11/7/2018) for DM/HTN. To-Do List   
 08/07/2018 Lab:  HEMOGLOBIN A1C WITH EAG Referral Information Referral ID Referred By Referred To  
  
 [id number] [place], [place] Narinder Muniz MD   
   [address] [facility], [address] [address] Phone: [phone] Fax: [phone] Visits Status Start Date End Date 1 New Request 8/7/18 8/7/19 If your referral has a status of pending review or denied, additional information will be sent to support the outcome of this decision. Patient Instructions Diabetes Foot Health: Care Instructions Your Care Instructions When you have diabetes, your feet need extra care and attention. Diabetes can damage the nerve endings and blood vessels in your feet, making you less likely to notice when your feet are injured. Diabetes also limits your body's ability to fight infection and get blood to areas that need it. If you get a minor foot injury, it could become an ulcer or a serious infection. With good foot care, you can prevent most of these problems. Caring for your feet can be quick and easy. Most of the care can be done when you are bathing or getting ready for bed. Follow-up care is a key part of your treatment and safety. Be sure to make and go to all appointments, and call your doctor if you are having problems. It's also a good idea to know your test results and keep a list of the medicines you take. How can you care for yourself at home? · Keep your blood sugar close to normal by watching what and how much you eat, monitoring blood sugar, taking medicines if prescribed, and getting regular exercise. · Do not smoke. Smoking affects blood flow and can make foot problems worse. If you need help quitting, talk to your doctor about stop-smoking programs and medicines. These can increase your chances of quitting for good. · Eat a diet that is low in fats. High fat intake can cause fat to build up in your blood vessels and decrease blood flow. · Inspect your feet daily for blisters, cuts, cracks, or sores. If you cannot see well, use a mirror or have someone help you. · Take care of your feet: 
[facility] your feet every day. Use warm (not hot) water. Check the water temperature with your wrists or other part of your body, not your feet. ¨ Dry your feet well. Pat them dry. Do not rub the skin on your feet too hard. Dry well between your toes. If the skin on your feet stays moist, bacteria or a fungus can grow, which can lead to infection. ¨ Keep your skin soft.  Use moisturizing skin cream to keep the skin on your feet soft and prevent calluses and cracks. But do not put the cream between your toes, and stop using any cream that causes a rash. ¨ Clean underneath your toenails carefully. Do not use a sharp object to clean underneath your toenails. Use the blunt end of a nail file or other rounded tool. ¨ Trim and file your toenails straight across to prevent ingrown toenails. Use a nail clipper, not scissors. Use an emery board to smooth the edges. · Change socks daily. Socks without seams are best, because seams often rub the feet. You can find socks for people with diabetes from specialty catalogs. · Look inside your shoes every day for things like gravel or torn linings, which could cause blisters or sores. · Buy shoes that fit well: 
¨ Look for shoes that have plenty of space around the toes. This helps prevent bunions and blisters. ¨ Try on shoes while wearing the kind of socks you will usually wear with the shoes. ¨ Avoid plastic shoes. They may rub your feet and cause blisters. Good shoes should be made of materials that are flexible and breathable, such as leather or cloth. ¨ Break in new shoes slowly by wearing them for no more than an hour a day for several days. Take extra time to check your feet for red areas, blisters, or other problems after you wear new shoes. · Do not go barefoot. Do not wear sandals, and do not wear shoes with very thin soles. Thin soles are easy to puncture. They also do not protect your feet from hot pavement or cold weather. · Have your doctor check your feet during each visit. If you have a foot problem, see your doctor. Do not try to treat an early foot problem at home. Home remedies or treatments that you can buy without a prescription (such as corn removers) can be harmful. · Always get early treatment for foot problems. A minor irritation can lead to a major problem if not properly cared for early. When should you call for help? Call your doctor now or seek immediate medical care if: 
  · You have a foot sore, an ulcer or break in the skin that is not healing after 4 days, bleeding corns or calluses, or an ingrown toenail.  
  · You have blue or black areas, which can mean bruising or blood flow problems.  
  · You have peeling skin or tiny blisters between your toes or cracking or oozing of the skin.  
  · You have a fever for more than 24 hours and a foot sore.  
  · You have new numbness or tingling in your feet that does not go away after you move your feet or change positions.  
  · You have unexplained or unusual swelling of the foot or ankle.  
 Watch closely for changes in your health, and be sure to contact your doctor if: 
  · You cannot do proper foot care. Where can you learn more? Go to http://ermelinda-yrn.info/. Enter A739 in the search box to learn more about \"Diabetes Foot Health: Care Instructions. \" Current as of: December 7, 2017 Content Version: 11.7 © 6238-5273 [employer]. Care instructions adapted under license by [employer] (which disclaims liability or warranty for this information). If you have questions about a medical condition or this instruction, always ask your healthcare professional. [address] any warranty or liability for your use of this information. Introducing [facility]! Dear Osmar Ibarra: Thank you for requesting a [employer] account. Our records indicate that you already have an active [employer] account. You can access your account anytime at [email or web address]. [employer]/[facility] Did you know that you can access your hospital and ER discharge instructions at any time in [employer]? You can also review all of your test results from your hospital stay or ER visit. Additional Information If you have questions, please visit the Frequently Asked Questions section of the [employer] website at [email or web address] [email or web address] Remember, [employer] is NOT to be used for urgent needs. For medical emergencies, dial 911. Now available from your iPhone and Android! Please provide this summary of care documentation to your next provider. If you have any questions after today's visit, please call [phone].

## 2018-08-07 NOTE — PROGRESS NOTES
Chief Complaint   Patient presents with    Diabetes     1. Have you been to the ER, urgent care clinic since your last visit? Hospitalized since your last visit? No    2. Have you seen or consulted any other health care providers outside of the [address] [place] [address] since your last visit? Include any pap smears or colon screening.  No

## 2018-08-07 NOTE — PROGRESS NOTES
Subjective:    Qian Corrales is a 34y.o. year old male seen for follow up of diabetes. He also has hypertension and hyperlipidemia. Diabetic Review of Systems - medication compliance: compliant all of the time, diabetic diet compliance: compliant all of the time, home glucose monitoring: fasting values range , further diabetic ROS: no polyuria or polydipsia, no chest pain, dyspnea or TIA's, no numbness, tingling or pain in extremities, last eye exam approximately 3 years ago. Other symptoms and concerns: patient states he needs a letter reflecting that his scrotal abcess was not a preexisting condition. States he was at home fixing his fence when he was cut. Onset of symptoms were 2 days prior to presenting to the ED. Patient Active Problem List   Diagnosis Code    Motion sickness T75. 3XXA    Kidney stone N20.0    Diabetes mellitus, type 2 ([facility] [address] E11.9    Cellulitis, scrotum N49.2    Sepsis ([facility]) A41.9    Cellulitis, perineum L03.315    Hyperglycemia due to type 2 diabetes mellitus ([facility] [address] E11.65    Hypokalemia E87.6    Obesity, morbid ([facility]) E66.01    Type 2 diabetes with nephropathy ([facility]) E11.21     Current Outpatient Prescriptions   Medication Sig Dispense Refill    Blood-Glucose Transmitter (DEXCOM G6 TRANSMITTER) darby Check glucose 3 times per day 1 Device 0    glucose blood VI test strips (BLOOD GLUCOSE TEST) strip Check glucose twice daily; to use with freestyle meter 50 Strip 3    insulin glargine (LANTUS,BASAGLAR) 100 unit/mL (3 mL) inpn 20 Units by SubCUTAneous route nightly. 15 mL 3    Insulin Needles, Disposable, 31 gauge x 5/16\" ndle To use with lantus solostar 50 Pen Needle 11    lisinopril (PRINIVIL, ZESTRIL) 10 mg tablet Take 1 Tab by mouth daily. 30 Tab 2    metFORMIN (GLUCOPHAGE) 500 mg tablet Take 1 tablet by mouth two (2) times daily (with meals).  180 tablet 1    Blood-Glucose Meter monitoring kit One touch mini 1 kit 0    Insulin Needles, Disposable, (SURE-FINE PEN NEEDLES) 31 x 3/16 \" ndle To use with Lantus solostar. 30 each 5      Allergies   Allergen Reactions    Tylenol Cold [Ndv-Nzzwzeivr-Hm-Acetaminophen] Swelling     Past Surgical History:   Procedure Laterality Date    HX KNEE ARTHROSCOPY       Family History   Problem Relation Age of Onset    Cancer Father     Hypertension Father     Cancer Paternal Uncle     Diabetes Father     Hypertension Mother       Lab Results   Component Value Date/Time    Cholesterol, total 187 07/09/2018 09:13 AM    HDL Cholesterol 34 (L) 07/09/2018 09:13 AM    LDL, calculated 116 (H) 07/09/2018 09:13 AM    VLDL, calculated 37 (H) 07/09/2018 09:13 AM    Triglyceride 186 (H) 07/09/2018 09:13 AM     Lab Results   Component Value Date/Time    Sodium 141 07/09/2018 09:13 AM    Potassium 4.2 07/09/2018 09:13 AM    Chloride 100 07/09/2018 09:13 AM    CO2 25 07/09/2018 09:13 AM    Anion gap 16.0 07/09/2018 09:13 AM    Glucose 112 (H) 07/09/2018 09:13 AM    BUN 11 07/09/2018 09:13 AM    Creatinine 0.7 07/09/2018 09:13 AM    BUN/Creatinine ratio 16 03/23/2015 09:17 AM    GFR est AA >60 06/24/2018 06:01 AM    GFR est non-AA >60 06/24/2018 06:01 AM    Calcium 9.6 07/09/2018 09:13 AM    Bilirubin, total 0.5 07/09/2018 09:13 AM    AST (SGOT) 14 07/09/2018 09:13 AM    Alk.  phosphatase 73 07/09/2018 09:13 AM    Protein, total 6.9 07/09/2018 09:13 AM    Albumin 4.4 07/09/2018 09:13 AM    Globulin 2.5 07/09/2018 09:13 AM    A-G Ratio 1.8 07/09/2018 09:13 AM    ALT (SGPT) 20 07/09/2018 09:13 AM     Lab Results   Component Value Date/Time    WBC 6.6 07/09/2018 09:13 AM    HGB 15.1 07/09/2018 09:13 AM    HCT 45.0 07/09/2018 09:13 AM    PLATELET 640 20/32/7013 09:13 AM    MCV 89 07/09/2018 09:13 AM     Wt Readings from Last 3 Encounters:   07/16/18 340 lb (154.2 kg)   07/09/18 347 lb (157.4 kg)   06/14/18 340 lb (154.2 kg)     Last Point of Care HGB A1C  Hemoglobin A1c (POC)   Date Value Ref Range Status   09/09/2014 10.1 % Final      BP Readings from Last 3 Encounters:   07/16/18 140/88   07/09/18 (!) 140/100   06/25/18 135/82       Last Point of Care Urine mircoalbumin  Results for orders placed or performed in visit on 07/09/18   CBC WITH AUTOMATED DIFF   Result Value Ref Range    WBC 6.6 4.0 - 11.0 K/uL    RBC 5.05 3.80 - 5.80 M/uL    HGB 15.1 13.2 - 17.3 g/dL    HCT 45.0 36.6 - 51.9 %    MCV 89 80 - 95 fL    MCH 30 26 - 34 pg    MCHC 34 31 - 36 g/dL    RDW 13.9 10.0 - 15.5 %    PLATELET 618 541 - 212 K/uL    MPV 10.7 9.0 - 13.0 fL    NEUTROPHILS 62 40 - 75 %    Lymphocytes 29 20 - 45 %    MONOCYTES 6 3 - 12 %    EOSINOPHILS 3 0 - 6 %    BASOPHILS 1 0 - 2 %    ABS. NEUTROPHILS 4.1 1.8 - 7.7 K/uL    ABSOLUTE LYMPHOCYTE COUNT 1.9 1.0 - 4.8 K/uL    ABS. MONOCYTES 0.4 0.1 - 1.0 K/uL    ABS. EOSINOPHILS 0.2 0.0 - 0.5 K/uL    ABS. BASOPHILS 0.0 0.0 - 0.2 K/uL   METABOLIC PANEL, COMPREHENSIVE   Result Value Ref Range    Glucose 112 (H) 70 - 99 mg/dL    BUN 11 6 - 22 mg/dL    Creatinine 0.7 0.5 - 1.2 mg/dL    Sodium 141 133 - 145 mmol/L    Potassium 4.2 3.5 - 5.5 mmol/L    Chloride 100 98 - 110 mmol/L    CO2 25 20 - 32 mmol/L    AST (SGOT) 14 10 - 37 U/L    ALT (SGPT) 20 5 - 40 U/L    Alk.  phosphatase 73 25 - 115 U/L    Bilirubin, total 0.5 0.2 - 1.2 mg/dL    Calcium 9.6 8.4 - 10.4 mg/dL    Protein, total 6.9 6.4 - 8.3 g/dL    Albumin 4.4 3.5 - 5.0 g/dL    A-G Ratio 1.8 1.1 - 2.6 ratio    Globulin 2.5 2.0 - 4.0 g/dL    Anion gap 16.0 mmol/L    GFRAA >60.0 >60.0    GFRNA >60.0 >60.0   LIPID PANEL   Result Value Ref Range    Triglyceride 186 (H) 40 - 149 mg/dL    HDL Cholesterol 34 (L) 40 - 59 mg/dL    Cholesterol, total 187 110 - 200 mg/dL    CHOLESTEROL/HDL 5.5 0.0 - 5.0    LDL, calculated 116 (H) 50 - 99 mg/dL    VLDL, calculated 37 (H) 8 - 30 mg/dL   MICROALBUMIN, UR, RAND W/ MICROALB/CREAT RATIO   Result Value Ref Range    Creatinine, urine 118 mg/dL    Microalbumin, urine 79.4 (H) 0.1 - 17.0 ug/mL    Microalb/Creat ratio (ug/mg creat.) 67.3 (H) 0.0 - 30.0 mcg/mg of Creatinine     Lab Results   Component Value Date/Time    Microalb/Creat ratio (ug/mg creat.) 67.3 (H) 07/09/2018 09:13 AM       Last Diabetic Foot Exam on: 9/9/14         Objective: There were no vitals taken for this visit. Awake and alert in no acute distress   Neck supple without lymphadenopathy, no carotid artery bruits auscultated bilaterally. No thyromegaly   Lungs clear throughout   S1 S2 RRR without ectopy or murmur auscultated. Extremities: no clubbing, cyanosis, peripheral edema   Foot exam: monofilament no abnormalities, DP/PT pulses +2 bilaterally, ankle reflex +2 bilaterally   Integumentary: no rashes   Reviewed vital signs       Diabetic foot exam:     Left Foot:   Visual Exam: normal    Pulse DP: 2+ (normal)   Filament test: normal sensation    Vibratory sensation: normal      Right Foot:   Visual Exam: normal    Pulse DP: 2+ (normal)   Filament test: normal sensation    Vibratory sensation: normal        Assessment/Plan:    ICD-10-CM ICD-9-CM    1. Type 2 diabetes mellitus with hyperglycemia, without long-term current use of insulin ([facility]) E11.65 250.00 HEMOGLOBIN A1C WITH EAG     790.29  DIABETES FOOT EXAM      REFERRAL TO OPHTHALMOLOGY      HEMOGLOBIN A1C WITH EAG   2. Obesity, morbid ([facility] [address] E66.01 278.01    3. Essential hypertension I10 401.9 lisinopril (PRINIVIL, ZESTRIL) 20 mg tablet   HTN not completely controlled, increase lisinopril 20 mg daily  Requested letter for recent hospitalization provided  improved  Issues reviewed with him: foot care discussed and Podiatry visits discussed and annual eye examinations at Ophthalmology discussed. I have discussed the diagnosis with the patient and the intended plan as seen in the above orders. The patient has received an after-visit summary and questions were answered concerning future plans. I have discussed medication side effects and warnings with the patient as well.   Patient agreeable with above plan and verbalizes understanding. Follow-up Disposition:  Return in about 3 months (around 11/7/2018) for DM/HTN.

## 2018-08-07 NOTE — LETTER
NOTIFICATION RETURN TO WORK / SCHOOL 
 
8/7/2018 8:30 AM 
 
Mr. Jenna Gifford [facility] [address] To Whom It May Concern: 
 
[facility]iff[facility] Please note Mr. Daley's admission from 6/14/18 to 6/26/18 was due to an acute injury sustained 2 days prior to arrival to ED on 6/12/18. If there are questions or concerns please have the patient contact our office.  
 
 
 
Sincerely, 
 
 
Jose Nelson NP

## 2018-08-08 LAB
AVG GLU, 10930: 142 MG/DL (ref 91–123)
HBA1C MFR BLD HPLC: 6.6 % (ref 4.8–5.9)

## 2018-09-11 DIAGNOSIS — E11.65 TYPE 2 DIABETES MELLITUS WITH HYPERGLYCEMIA, WITHOUT LONG-TERM CURRENT USE OF INSULIN (HCC): ICD-10-CM

## 2018-09-11 DIAGNOSIS — E11.65 TYPE 2 DIABETES MELLITUS WITH HYPERGLYCEMIA, WITH LONG-TERM CURRENT USE OF INSULIN (HCC): ICD-10-CM

## 2018-09-11 DIAGNOSIS — Z79.4 TYPE 2 DIABETES MELLITUS WITH HYPERGLYCEMIA, WITH LONG-TERM CURRENT USE OF INSULIN (HCC): ICD-10-CM

## 2018-09-11 DIAGNOSIS — I10 ESSENTIAL HYPERTENSION: ICD-10-CM

## 2018-09-12 RX ORDER — METFORMIN HYDROCHLORIDE 500 MG/1
1000 TABLET, FILM COATED, EXTENDED RELEASE ORAL DAILY
Qty: 180 MG | Refills: 3 | Status: SHIPPED | OUTPATIENT
Start: 2018-09-12 | End: 2018-09-21 | Stop reason: ALTCHOICE

## 2018-09-12 RX ORDER — INSULIN GLARGINE 100 [IU]/ML
20 INJECTION, SOLUTION SUBCUTANEOUS
Qty: 90 ML | Refills: 3 | Status: SHIPPED | OUTPATIENT
Start: 2018-09-12 | End: 2019-11-01 | Stop reason: SDUPTHER

## 2018-09-12 RX ORDER — PEN NEEDLE, DIABETIC 30 GX3/16"
NEEDLE, DISPOSABLE MISCELLANEOUS
Qty: 90 PEN NEEDLE | Refills: 11 | Status: SHIPPED | OUTPATIENT
Start: 2018-09-12 | End: 2020-02-17 | Stop reason: SDUPTHER

## 2018-09-12 RX ORDER — LISINOPRIL 20 MG/1
20 TABLET ORAL DAILY
Qty: 90 TAB | Refills: 3 | Status: SHIPPED | OUTPATIENT
Start: 2018-09-12 | End: 2019-01-11 | Stop reason: ALTCHOICE

## 2018-09-21 RX ORDER — METFORMIN HYDROCHLORIDE 500 MG/1
1000 TABLET, EXTENDED RELEASE ORAL
Qty: 180 TAB | Refills: 1 | Status: SHIPPED | OUTPATIENT
Start: 2018-09-21 | End: 2018-12-22 | Stop reason: SDUPTHER

## 2018-12-23 RX ORDER — METFORMIN HYDROCHLORIDE 500 MG/1
TABLET, EXTENDED RELEASE ORAL
Qty: 180 TAB | Refills: 0 | Status: SHIPPED | OUTPATIENT
Start: 2018-12-23 | End: 2019-05-15 | Stop reason: SDUPTHER

## 2019-01-11 ENCOUNTER — OFFICE VISIT (OUTPATIENT)
Dept: FAMILY MEDICINE CLINIC | Age: 34
End: 2019-01-11

## 2019-01-11 VITALS
TEMPERATURE: 98.4 F | OXYGEN SATURATION: 97 % | SYSTOLIC BLOOD PRESSURE: 161 MMHG | HEART RATE: 69 BPM | BODY MASS INDEX: 44.1 KG/M2 | RESPIRATION RATE: 16 BRPM | WEIGHT: 315 LBS | HEIGHT: 71 IN | DIASTOLIC BLOOD PRESSURE: 109 MMHG

## 2019-01-11 DIAGNOSIS — Z79.4 TYPE 2 DIABETES MELLITUS WITH HYPERGLYCEMIA, WITH LONG-TERM CURRENT USE OF INSULIN (HCC): Primary | ICD-10-CM

## 2019-01-11 DIAGNOSIS — R22.42 LEG MASS, LEFT: ICD-10-CM

## 2019-01-11 DIAGNOSIS — E11.65 TYPE 2 DIABETES MELLITUS WITH HYPERGLYCEMIA, WITH LONG-TERM CURRENT USE OF INSULIN (HCC): Primary | ICD-10-CM

## 2019-01-11 DIAGNOSIS — E78.49 OTHER HYPERLIPIDEMIA: ICD-10-CM

## 2019-01-11 DIAGNOSIS — I10 ESSENTIAL HYPERTENSION: ICD-10-CM

## 2019-01-11 RX ORDER — AMLODIPINE AND BENAZEPRIL HYDROCHLORIDE 5; 20 MG/1; MG/1
1 CAPSULE ORAL DAILY
Qty: 90 CAP | Refills: 0 | Status: SHIPPED | OUTPATIENT
Start: 2019-01-11 | End: 2019-02-21 | Stop reason: SDUPTHER

## 2019-01-11 NOTE — PROGRESS NOTES
Pt is here for F/U on HTN, Diabetes    1. Have you been to the ER, urgent care clinic since your last visit? Hospitalized since your last visit? No    2. Have you seen or consulted any other health care providers outside of the [address] [place] [address] since your last visit? Include any pap smears or colon screening.  No

## 2019-01-11 NOTE — PATIENT INSTRUCTIONS

## 2019-01-11 NOTE — PROGRESS NOTES
Subjective:    Armando Moody is a 28y.o. year old male seen for follow up of diabetes. He also has hypertension and hyperlipidemia. Diabetic Review of Systems - medication compliance: compliant all of the time, diabetic diet compliance: noncompliant some of the time, home glucose monitoring: is performed regularly, fasting values range 99, further diabetic ROS: no polyuria or polydipsia, no chest pain, dyspnea or TIA's, no numbness, tingling or pain in extremities, last eye exam approximately has not yet ago, acute symptoms are none. Other symptoms and concerns: reports he has a small non tender nodule to left lower lateral leg, non tender to palpation    Patient Active Problem List   Diagnosis Code    Motion sickness T75. 3XXA    Kidney stone N20.0    Diabetes mellitus, type 2 ([facility] E11.9    Cellulitis, scrotum N49.2    Sepsis ([facility]) A41.9    Cellulitis, perineum L03.315    Hyperglycemia due to type 2 diabetes mellitus ([facility] E11.65    Hypokalemia E87.6    Obesity, morbid ([facility]) E66.01    Type 2 diabetes with nephropathy ([facility]) E11.21     Current Outpatient Medications   Medication Sig Dispense Refill    metFORMIN ER (GLUCOPHAGE XR) 500 mg tablet TAKE 2 TABLETS BY MOUTH  DAILY WITH DINNER 180 Tab 0    glucose blood VI test strips (BLOOD GLUCOSE TEST) strip Check glucose twice daily; to use with freestyle meter 200 Strip 3    insulin glargine (LANTUS,BASAGLAR) 100 unit/mL (3 mL) inpn 20 Units by SubCUTAneous route nightly. 90 mL 3    Insulin Needles, Disposable, 31 gauge x 5/16\" ndle To use with lantus solostar 90 Pen Needle 11    lisinopril (PRINIVIL, ZESTRIL) 20 mg tablet Take 1 Tab by mouth daily.  90 Tab 3    Blood-Glucose Meter monitoring kit One touch mini 1 kit 0    Blood-Glucose Transmitter (DEXCOM G6 TRANSMITTER) darby Check glucose 3 times per day 1 Device 0      Allergies   Allergen Reactions    Tylenol Cold [Jiw-Aimsdjcvn-Gd-Acetaminophen] Swelling     Past Surgical History: Procedure Laterality Date    HX KNEE ARTHROSCOPY       Family History   Problem Relation Age of Onset    Cancer Father     Hypertension Father     Cancer Paternal Uncle     Diabetes Father     Hypertension Mother       Lab Results   Component Value Date/Time    Cholesterol, total 187 07/09/2018 09:13 AM    HDL Cholesterol 34 (L) 07/09/2018 09:13 AM    LDL, calculated 116 (H) 07/09/2018 09:13 AM    VLDL, calculated 37 (H) 07/09/2018 09:13 AM    Triglyceride 186 (H) 07/09/2018 09:13 AM     Lab Results   Component Value Date/Time    Sodium 141 07/09/2018 09:13 AM    Potassium 4.2 07/09/2018 09:13 AM    Chloride 100 07/09/2018 09:13 AM    CO2 25 07/09/2018 09:13 AM    Anion gap 16.0 07/09/2018 09:13 AM    Glucose 112 (H) 07/09/2018 09:13 AM    BUN 11 07/09/2018 09:13 AM    Creatinine 0.7 07/09/2018 09:13 AM    BUN/Creatinine ratio 16 03/23/2015 09:17 AM    GFR est AA >60 06/24/2018 06:01 AM    GFR est non-AA >60 06/24/2018 06:01 AM    Calcium 9.6 07/09/2018 09:13 AM    Bilirubin, total 0.5 07/09/2018 09:13 AM    AST (SGOT) 14 07/09/2018 09:13 AM    Alk.  phosphatase 73 07/09/2018 09:13 AM    Protein, total 6.9 07/09/2018 09:13 AM    Albumin 4.4 07/09/2018 09:13 AM    Globulin 2.5 07/09/2018 09:13 AM    A-G Ratio 1.8 07/09/2018 09:13 AM    ALT (SGPT) 20 07/09/2018 09:13 AM     Lab Results   Component Value Date/Time    WBC 6.6 07/09/2018 09:13 AM    HGB 15.1 07/09/2018 09:13 AM    HCT 45.0 07/09/2018 09:13 AM    PLATELET 121 86/73/0925 09:13 AM    MCV 89 07/09/2018 09:13 AM     Wt Readings from Last 3 Encounters:   01/11/19 (!) 365 lb (165.6 kg)   08/07/18 346 lb (156.9 kg)   07/16/18 340 lb (154.2 kg)     Last Point of Care HGB A1C  Hemoglobin A1c (POC)   Date Value Ref Range Status   09/09/2014 10.1 % Final      BP Readings from Last 3 Encounters:   01/11/19 (!) 161/109   08/07/18 159/87   07/16/18 140/88       Last Point of Care Urine mircoalbumin  Results for orders placed or performed in visit on 08/07/18 HEMOGLOBIN A1C W/O EAG   Result Value Ref Range    Hemoglobin A1c 6.6 (H) 4.8 - 5.9 %    AVG  (H) 91 - 123 mg/dL     Lab Results   Component Value Date/Time    Microalb/Creat ratio (ug/mg creat.) 67.3 (H) 07/09/2018 09:13 AM       Last Diabetic Foot Exam on: 8/7/18        Objective:  Visit Vitals  BP (!) 161/109 (BP 1 Location: Left arm)   Pulse 69   Temp 98.4 °F (36.9 °C) (Oral)   Resp 16   Ht 5' 11\" (1.803 m)   Wt (!) 365 lb (165.6 kg)   SpO2 97%   BMI 50.91 kg/m²     Awake and alert in no acute distress   Neck supple without lymphadenopathy, no carotid artery bruits auscultated bilaterally. No thyromegaly   Lungs clear throughout   S1 S2 RRR without ectopy or murmur auscultated. Extremities: no clubbing, cyanosis, peripheral edema   Foot exam: monofilament no abnormalities, DP/PT pulses +2 bilaterally, ankle reflex +2 bilaterally   Integumentary: no rashes 1.5 cmx2cm soft nodule to left lower lateral leg, non tender to palpation. Reviewed vital signs           Assessment/Plan:    ICD-10-CM ICD-9-CM    1. Type 2 diabetes mellitus with hyperglycemia, with long-term current use of insulin ([facility]) N66.66 964.76 METABOLIC PANEL, COMPREHENSIVE    Z79.4 790.29 LIPID PANEL     V58.67 HEMOGLOBIN A1C WITH EAG   2. Essential hypertension P79 891.3 METABOLIC PANEL, COMPREHENSIVE      LIPID PANEL   3. Other hyperlipidemia S84.70 568.9 METABOLIC PANEL, COMPREHENSIVE      LIPID PANEL   4. Leg mass, left R22.42 782.2 DUPLEX LOWER EXT VENOUS LEFT     Orders Placed This Encounter    METABOLIC PANEL, COMPREHENSIVE    LIPID PANEL    HEMOGLOBIN A1C WITH EAG    amLODIPine-benazepril (LOTREL) 5-20 mg per capsule      stable  Issues reviewed with him: low cholesterol diet, weight control and daily exercise discussed. I have discussed the diagnosis with the patient and the intended plan as seen in the above orders. The patient has received an after-visit summary and questions were answered concerning future plans.   I have discussed medication side effects and warnings with the patient as well. Patient agreeable with above plan and verbalizes understanding. Follow-up Disposition:  Return in about 6 weeks (around 2/22/2019) for HTN since medication adjustment.

## 2019-01-12 LAB
A-G RATIO,AGRAT: 1.9 RATIO (ref 1.1–2.6)
ALBUMIN SERPL-MCNC: 4.6 G/DL (ref 3.5–5)
ALP SERPL-CCNC: 94 U/L (ref 25–115)
ALT SERPL-CCNC: 36 U/L (ref 5–40)
ANION GAP SERPL CALC-SCNC: 17 MMOL/L
AST SERPL W P-5'-P-CCNC: 14 U/L (ref 10–37)
AVG GLU, 10930: 206 MG/DL (ref 91–123)
BILIRUB SERPL-MCNC: 0.7 MG/DL (ref 0.2–1.2)
BUN SERPL-MCNC: 14 MG/DL (ref 6–22)
CALCIUM SERPL-MCNC: 9.7 MG/DL (ref 8.4–10.4)
CHLORIDE SERPL-SCNC: 101 MMOL/L (ref 98–110)
CHOLEST SERPL-MCNC: 183 MG/DL (ref 110–200)
CO2 SERPL-SCNC: 24 MMOL/L (ref 20–32)
CREAT SERPL-MCNC: 0.9 MG/DL (ref 0.5–1.2)
GFRAA, 66117: >60
GFRNA, 66118: >60
GLOBULIN,GLOB: 2.4 G/DL (ref 2–4)
GLUCOSE SERPL-MCNC: 368 MG/DL (ref 70–99)
HBA1C MFR BLD HPLC: 8.8 % (ref 4.8–5.9)
HDLC SERPL-MCNC: 34 MG/DL (ref 40–59)
HDLC SERPL-MCNC: 5.4 MG/DL (ref 0–5)
LDL, DIRECT,DLDL: 92 MG/DL (ref 50–99)
LDLC SERPL CALC-MCNC: ABNORMAL MG/DL (ref 50–99)
POTASSIUM SERPL-SCNC: 4.5 MMOL/L (ref 3.5–5.5)
PROT SERPL-MCNC: 7 G/DL (ref 6.4–8.3)
SODIUM SERPL-SCNC: 142 MMOL/L (ref 133–145)
TRIGL SERPL-MCNC: 465 MG/DL (ref 40–149)
VLDLC SERPL CALC-MCNC: 93 MG/DL (ref 8–30)

## 2019-01-14 ENCOUNTER — HOSPITAL ENCOUNTER (OUTPATIENT)
Dept: VASCULAR SURGERY | Age: 34
Discharge: HOME | End: 2019-01-14
Attending: NURSE PRACTITIONER
Payer: COMMERCIAL

## 2019-01-14 DIAGNOSIS — R22.42 LEG MASS, LEFT: ICD-10-CM

## 2019-01-14 PROCEDURE — 93971 EXTREMITY STUDY: CPT

## 2019-01-15 RX ORDER — FENOFIBRATE 145 MG/1
145 TABLET, COATED ORAL DAILY
Qty: 90 TAB | Refills: 0 | Status: SHIPPED | OUTPATIENT
Start: 2019-01-15 | End: 2019-02-21 | Stop reason: SDUPTHER

## 2019-02-22 ENCOUNTER — OFFICE VISIT (OUTPATIENT)
Dept: FAMILY MEDICINE CLINIC | Age: 34
End: 2019-02-22

## 2019-02-22 VITALS
BODY MASS INDEX: 44.1 KG/M2 | WEIGHT: 315 LBS | RESPIRATION RATE: 16 BRPM | HEART RATE: 89 BPM | DIASTOLIC BLOOD PRESSURE: 90 MMHG | SYSTOLIC BLOOD PRESSURE: 140 MMHG | HEIGHT: 71 IN | TEMPERATURE: 98 F | OXYGEN SATURATION: 98 %

## 2019-02-22 DIAGNOSIS — Z79.4 TYPE 2 DIABETES MELLITUS WITH HYPERGLYCEMIA, WITH LONG-TERM CURRENT USE OF INSULIN (HCC): ICD-10-CM

## 2019-02-22 DIAGNOSIS — E11.65 TYPE 2 DIABETES MELLITUS WITH HYPERGLYCEMIA, WITH LONG-TERM CURRENT USE OF INSULIN (HCC): ICD-10-CM

## 2019-02-22 DIAGNOSIS — I10 ESSENTIAL HYPERTENSION: Primary | ICD-10-CM

## 2019-02-22 RX ORDER — FENOFIBRATE 145 MG/1
TABLET, COATED ORAL
Qty: 90 TAB | Refills: 0 | Status: SHIPPED | OUTPATIENT
Start: 2019-02-22 | End: 2019-04-04 | Stop reason: SDUPTHER

## 2019-02-22 RX ORDER — AMLODIPINE AND BENAZEPRIL HYDROCHLORIDE 5; 20 MG/1; MG/1
CAPSULE ORAL
Qty: 90 CAP | Refills: 0 | Status: SHIPPED | OUTPATIENT
Start: 2019-02-22 | End: 2019-03-25 | Stop reason: DRUGHIGH

## 2019-02-22 NOTE — PROGRESS NOTES
Subjective:   Shayy Gray is a 28 y.o. male with hypertension presents for 6 weeks follow up, took medication 1hr before appt. Patient began exercising 2 days per week for 30-45 mins. Patient's last lab results revealed elevated triglyceride levels for which he was started on tricor. Reports he is taking as prescribed without any adverse effects. Patient Active Problem List   Diagnosis Code    Motion sickness T75. 3XXA    Kidney stone N20.0    Diabetes mellitus, type 2 ([address] E11.9    Cellulitis, scrotum N49.2    Sepsis ([facility]) A41.9    Cellulitis, perineum L03.315    Hyperglycemia due to type 2 diabetes mellitus ([address] E11.65    Hypokalemia E87.6    Obesity, morbid ([facility]) E66.01    Type 2 diabetes with nephropathy ([facility]) E11.21     Current Outpatient Medications   Medication Sig Dispense Refill    fenofibrate nanocrystallized (TRICOR) 145 mg tablet Take 1 Tab by mouth daily. 90 Tab 0    amLODIPine-benazepril (LOTREL) 5-20 mg per capsule Take 1 Cap by mouth daily. 90 Cap 0    metFORMIN ER (GLUCOPHAGE XR) 500 mg tablet TAKE 2 TABLETS BY MOUTH  DAILY WITH DINNER 180 Tab 0    glucose blood VI test strips (BLOOD GLUCOSE TEST) strip Check glucose twice daily; to use with freestyle meter 200 Strip 3    insulin glargine (LANTUS,BASAGLAR) 100 unit/mL (3 mL) inpn 20 Units by SubCUTAneous route nightly.  90 mL 3    Insulin Needles, Disposable, 31 gauge x 5/16\" ndle To use with lantus solostar 90 Pen Needle 11    Blood-Glucose Transmitter (DEXCOM G6 TRANSMITTER) darby Check glucose 3 times per day 1 Device 0    Blood-Glucose Meter monitoring kit One touch mini 1 kit 0      Allergies   Allergen Reactions    Tylenol Cold [Vsz-Qotqawgeb-Wn-Acetaminophen] Swelling     Past Surgical History:   Procedure Laterality Date    HX KNEE ARTHROSCOPY       Family History   Problem Relation Age of Onset    Cancer Father     Hypertension Father     Cancer Paternal Uncle     Diabetes Father     Hypertension Mother Lab Results   Component Value Date/Time    Cholesterol, total 183 01/11/2019 08:34 AM    HDL Cholesterol 34 (L) 01/11/2019 08:34 AM    LDL,Direct 92 01/11/2019 08:34 AM    LDL, calculated  01/11/2019 08:34 AM      Comment:      Triglyceride >400. LDL cannot be calculated. LDL Cholesterol Direct has been  ordered. VLDL, calculated 93 (H) 01/11/2019 08:34 AM    Triglyceride 465 (H) 01/11/2019 08:34 AM     Lab Results   Component Value Date/Time    Sodium 142 01/11/2019 08:34 AM    Potassium 4.5 01/11/2019 08:34 AM    Chloride 101 01/11/2019 08:34 AM    CO2 24 01/11/2019 08:34 AM    Anion gap 17.0 01/11/2019 08:34 AM    Glucose 368 (H) 01/11/2019 08:34 AM    BUN 14 01/11/2019 08:34 AM    Creatinine 0.9 01/11/2019 08:34 AM    BUN/Creatinine ratio 16 03/23/2015 09:17 AM    GFR est AA >60 06/24/2018 06:01 AM    GFR est non-AA >60 06/24/2018 06:01 AM    Calcium 9.7 01/11/2019 08:34 AM    Bilirubin, total 0.7 01/11/2019 08:34 AM    AST (SGOT) 14 01/11/2019 08:34 AM    Alk. phosphatase 94 01/11/2019 08:34 AM    Protein, total 7.0 01/11/2019 08:34 AM    Albumin 4.6 01/11/2019 08:34 AM    Globulin 2.4 01/11/2019 08:34 AM    A-G Ratio 1.9 01/11/2019 08:34 AM    ALT (SGPT) 36 01/11/2019 08:34 AM     Lab Results   Component Value Date/Time    WBC 6.6 07/09/2018 09:13 AM    HGB 15.1 07/09/2018 09:13 AM    HCT 45.0 07/09/2018 09:13 AM    PLATELET 866 93/96/0139 09:13 AM    MCV 89 07/09/2018 09:13 AM     Wt Readings from Last 3 Encounters:   02/22/19 (!) 357 lb (161.9 kg)   01/11/19 (!) 365 lb (165.6 kg)   08/07/18 346 lb (156.9 kg)     Last Point of Care HGB A1C  Hemoglobin A1c (POC)   Date Value Ref Range Status   09/09/2014 10.1 % Final      BP Readings from Last 3 Encounters:   02/22/19 (!) 150/95   01/11/19 (!) 161/109   08/07/18 159/87       Hypertension ROS: taking medications as instructed, no medication side effects noted, no TIA's, no chest pain on exertion, no dyspnea on exertion, no swelling of ankles.        New concerns: none. Objective:   Visit Vitals  BP (!) 150/95 (BP 1 Location: Left arm)   Pulse 89   Temp 98 °F (36.7 °C) (Oral)   Resp 16   Ht 5' 11\" (1.803 m)   Wt (!) 357 lb (161.9 kg)   SpO2 98%   BMI 49.79 kg/m²      General appearance - alert, well appearing, and in no distress  Neck - supple, no significant adenopathy, carotids upstroke normal bilaterally, no bruits  Chest - clear to auscultation, no wheezes, rales or rhonchi, symmetric air entry  Heart - normal rate, regular rhythm, normal S1, S2, no murmurs, rubs, clicks or gallops  Extremities - peripheral pulses normal, no pedal edema, no clubbing or cyanosis          Assessment/Plan:      ICD-10-CM ICD-9-CM    1. Essential hypertension I10 401.9 LIPID PANEL      METABOLIC PANEL, COMPREHENSIVE   2. Type 2 diabetes mellitus with hyperglycemia, with long-term current use of insulin ([facility]) E11.65 250.00 HEMOGLOBIN A1C WITH EAG    Z79.4 790.29      V58.67    instructed to have labs drawn 1-2 weeks prior to follow up visit and will review labs during visit. praised patient on weight loss and exercise regimen, encouraged to continue  current treatment plan is effective, no change in therapy  cardiovascular risk and specific lipid/LDL goals reviewed  I have discussed the diagnosis with the patient and the intended plan as seen in the above orders. The patient has received an after-visit summary and questions were answered concerning future plans. I have discussed medication side effects and warnings with the patient as well. Patient agreeable with above plan and verbalizes understanding. Follow-up Disposition:  Return in about 2 months (around 4/22/2019) for HTN/DM.

## 2019-02-22 NOTE — PATIENT INSTRUCTIONS
Hyperlipidemia: After Your Visit  Your Care Instructions  Hyperlipidemia is too much fat in your blood. The body has several kinds of fat, including cholesterol and triglycerides. Your body needs fat for many things, such as making new cells. But too much fat in your blood increases your chances of having a heart attack or stroke. You may be able to lower your cholesterol and triglycerides with a heart-healthy diet, exercise, and if needed, medicine. Your doctor may want you to try lifestyle changes first to see whether they lower the fat in your blood. You may need to take medicine if lifestyle changes do not lower the fat in your blood enough. Follow-up care is a key part of your treatment and safety. Be sure to make and go to all appointments, and call your doctor if you are having problems. Its also a good idea to know your test results and keep a list of the medicines you take. How can you care for yourself at home? Take your medicines  · Take your medicines exactly as prescribed. Call your doctor if you think you are having a problem with your medicine. · If you take medicine to lower your cholesterol, go to follow-up visits. You will need to have blood tests. · Do not take large doses of niacin, which is a B vitamin, while taking medicine called statins. It may increase the chance of muscle pain and liver problems. · Talk to your doctor about avoiding grapefruit juice if you are taking statins. Grapefruit juice can raise the level of this medicine in your blood. This could increase side effects. Eat more fruits, vegetables, and fiber  · Fruits and vegetables have lots of nutrients that help protect against heart disease, and they have little--if any--fat. Try to eat at least five servings a day. Dark green, deep orange, or yellow fruits and vegetables are healthy choices. · Keep carrots, celery, and other veggies handy for snacks.  Buy fruit that is in season and store it where you can see it so that you will be tempted to eat it. Cook dishes that have a lot of veggies in them, such as stir-fries and soups. · Foods high in fiber may reduce your cholesterol and provide important vitamins and minerals. High-fiber foods include whole-grain cereals and breads, oatmeal, beans, brown rice, citrus fruits, and apples. · Buy whole-grain breads and cereals instead of white bread and pastries. Limit saturated fat  · Read food labels and try to avoid saturated fat and trans fat. They increase your risk of heart disease. · Use olive or canola oil when you cook. Try cholesterol-lowering spreads, such as Benecol or Take Control. · Bake, broil, grill, or steam foods instead of frying them. · Limit the amount of high-fat meats you eat, including hot dogs and sausages. Cut out all visible fat when you prepare meat. · Eat fish, skinless poultry, and soy products such as tofu instead of high-fat meats. Soybeans may be especially good for your heart. Eat at least two servings of fish a week. Certain fish, such as salmon, contain omega-3 fatty acids, which may help reduce your risk of heart attack. · Choose low-fat or fat-free milk and dairy products. Get exercise, limit alcohol, and quit smoking  · Get more exercise. Work with your doctor to set up an exercise program. Even if you can do only a small amount, exercise will help you get stronger, have more energy, and manage your weight and your stress. Walking is an easy way to get exercise. Gradually increase the amount you walk every day. Aim for at least 30 minutes on most days of the week. You also may want to swim, bike, or do other activities. · Limit alcohol to no more than 2 drinks a day for men and 1 drink a day for women. · Do not smoke. If you need help quitting, talk to your doctor about stop-smoking programs and medicines. These can increase your chances of quitting for good. When should you call for help?   Call 911 anytime you think you may need emergency care. For example, call if:  · You have symptoms of a heart attack. These may include:  ¨ Chest pain or pressure, or a strange feeling in the chest.  ¨ Sweating. ¨ Shortness of breath. ¨ Nausea or vomiting. ¨ Pain, pressure, or a strange feeling in the back, neck, jaw, or upper belly or in one or both shoulders or arms. ¨ Lightheadedness or sudden weakness. ¨ A fast or irregular heartbeat. After you call 911, the  may tell you to chew 1 adult-strength or 2 to 4 low-dose aspirin. Wait for an ambulance. Do not try to drive yourself. · You have signs of a stroke. These may include:  ¨ Sudden numbness, paralysis, or weakness in your face, arm, or leg, especially on only one side of your body. ¨ New problems with walking or balance. ¨ Sudden vision changes. ¨ Drooling or slurred speech. ¨ New problems speaking or understanding simple statements, or feeling confused. ¨ A sudden, severe headache that is different from past headaches. · You passed out (lost consciousness). Call your doctor now or seek immediate medical care if:  · You have muscle pain or weakness. Watch closely for changes in your health, and be sure to contact your doctor if:  · You are very tired. · You have an upset stomach, gas, constipation, or belly pain or cramps. Where can you learn more? Go to [employer]  Enter C406 in the search box to learn more about \"Hyperlipidemia: After Your Visit. \"   © 0829-1849 [employer]wise, Incorporated. Care instructions adapted under license by [facility] (which disclaims liability or warranty for this information). This care instruction is for use with your licensed healthcare professional. If you have questions about a medical condition or this instruction, always ask your healthcare professional. [place] [address] any warranty or liability for your use of this information.   Content Version: 3.3.020214; Last Revised: October 13, 2011

## 2019-02-22 NOTE — PROGRESS NOTES
Pt is here for6 week f/u HTN    1. Have you been to the ER, urgent care clinic since your last visit? Hospitalized since your last visit? No    2. Have you seen or consulted any other health care providers outside of the [address] [place] [address] since your last visit? Include any pap smears or colon screening. [facility] 2/19 routine eye exam.       Pt states his insurance did not cover the [employer] scanning system. Discontinued per radha read back JALEN Hightower.

## 2019-03-11 ENCOUNTER — OFFICE VISIT (OUTPATIENT)
Dept: FAMILY MEDICINE CLINIC | Age: 34
End: 2019-03-11

## 2019-03-11 VITALS
OXYGEN SATURATION: 97 % | RESPIRATION RATE: 16 BRPM | HEIGHT: 71 IN | WEIGHT: 315 LBS | HEART RATE: 96 BPM | SYSTOLIC BLOOD PRESSURE: 170 MMHG | DIASTOLIC BLOOD PRESSURE: 108 MMHG | TEMPERATURE: 98.1 F | BODY MASS INDEX: 44.1 KG/M2

## 2019-03-11 DIAGNOSIS — I10 ESSENTIAL HYPERTENSION: ICD-10-CM

## 2019-03-11 DIAGNOSIS — R10.13 EPIGASTRIC PAIN: ICD-10-CM

## 2019-03-11 DIAGNOSIS — Z79.4 TYPE 2 DIABETES MELLITUS WITH HYPERGLYCEMIA, WITH LONG-TERM CURRENT USE OF INSULIN (HCC): ICD-10-CM

## 2019-03-11 DIAGNOSIS — R10.9 RIGHT FLANK PAIN: ICD-10-CM

## 2019-03-11 DIAGNOSIS — K21.9 GASTROESOPHAGEAL REFLUX DISEASE, ESOPHAGITIS PRESENCE NOT SPECIFIED: ICD-10-CM

## 2019-03-11 DIAGNOSIS — Z23 ENCOUNTER FOR IMMUNIZATION: ICD-10-CM

## 2019-03-11 DIAGNOSIS — R10.31 RIGHT LOWER QUADRANT ABDOMINAL PAIN: Primary | ICD-10-CM

## 2019-03-11 DIAGNOSIS — E11.65 TYPE 2 DIABETES MELLITUS WITH HYPERGLYCEMIA, WITH LONG-TERM CURRENT USE OF INSULIN (HCC): ICD-10-CM

## 2019-03-11 LAB
A-G RATIO,AGRAT: 1.9 RATIO (ref 1.1–2.6)
ABSOLUTE LYMPHOCYTE COUNT, 10803: 2.3 K/UL (ref 1–4.8)
ALBUMIN SERPL-MCNC: 4.8 G/DL (ref 3.5–5)
ALP SERPL-CCNC: 91 U/L (ref 25–115)
ALT SERPL-CCNC: 40 U/L (ref 5–40)
ANION GAP SERPL CALC-SCNC: 13 MMOL/L
AST SERPL W P-5'-P-CCNC: 16 U/L (ref 10–37)
BASOPHILS # BLD: 0 K/UL (ref 0–0.2)
BASOPHILS NFR BLD: 0 % (ref 0–2)
BILIRUB SERPL-MCNC: 0.5 MG/DL (ref 0.2–1.2)
BILIRUB UR QL STRIP: NEGATIVE
BUN SERPL-MCNC: 14 MG/DL (ref 6–22)
CALCIUM SERPL-MCNC: 10.3 MG/DL (ref 8.4–10.4)
CHLORIDE SERPL-SCNC: 96 MMOL/L (ref 98–110)
CO2 SERPL-SCNC: 29 MMOL/L (ref 20–32)
CREAT SERPL-MCNC: 0.9 MG/DL (ref 0.5–1.2)
EOSINOPHIL # BLD: 0.2 K/UL (ref 0–0.5)
EOSINOPHIL NFR BLD: 2 % (ref 0–6)
ERYTHROCYTE [DISTWIDTH] IN BLOOD BY AUTOMATED COUNT: 12.6 % (ref 10–15.5)
GFRAA, 66117: >60
GFRNA, 66118: >60
GLOBULIN,GLOB: 2.5 G/DL (ref 2–4)
GLUCOSE SERPL-MCNC: 359 MG/DL (ref 70–99)
GLUCOSE UR-MCNC: NORMAL MG/DL
GRANULOCYTES,GRANS: 65 % (ref 40–75)
HCT VFR BLD AUTO: 46.3 % (ref 36.6–51.9)
HGB BLD-MCNC: 16.5 G/DL (ref 13.2–17.3)
KETONES P FAST UR STRIP-MCNC: NEGATIVE MG/DL
LYMPHOCYTES, LYMLT: 29 % (ref 20–45)
MCH RBC QN AUTO: 30 PG (ref 26–34)
MCHC RBC AUTO-ENTMCNC: 36 G/DL (ref 31–36)
MCV RBC AUTO: 85 FL (ref 80–95)
MONOCYTES # BLD: 0.3 K/UL (ref 0.1–1)
MONOCYTES NFR BLD: 4 % (ref 3–12)
NEUTROPHILS # BLD AUTO: 5.2 K/UL (ref 1.8–7.7)
PH UR STRIP: 5 [PH] (ref 4.6–8)
PLATELET # BLD AUTO: 283 K/UL (ref 140–440)
PMV BLD AUTO: 11.1 FL (ref 9–13)
POTASSIUM SERPL-SCNC: 4.5 MMOL/L (ref 3.5–5.5)
PROT SERPL-MCNC: 7.3 G/DL (ref 6.4–8.3)
PROT UR QL STRIP: NORMAL
RBC # BLD AUTO: 5.45 M/UL (ref 3.8–5.8)
SODIUM SERPL-SCNC: 138 MMOL/L (ref 133–145)
SP GR UR STRIP: 1.01 (ref 1–1.03)
UA UROBILINOGEN AMB POC: NORMAL (ref 0.2–1)
URINALYSIS CLARITY POC: CLEAR
URINALYSIS COLOR POC: YELLOW
URINE BLOOD POC: NORMAL
URINE LEUKOCYTES POC: NEGATIVE
URINE NITRITES POC: NEGATIVE
WBC # BLD AUTO: 8.1 K/UL (ref 4–11)

## 2019-03-11 RX ORDER — OMEPRAZOLE 20 MG/1
20 CAPSULE, DELAYED RELEASE ORAL
Qty: 30 CAP | Refills: 1 | Status: SHIPPED | OUTPATIENT
Start: 2019-03-11

## 2019-03-11 NOTE — PROGRESS NOTES
HISTORY OF PRESENT ILLNESS  Krystle Manley is a 28 y.o. male. GERD   The history is provided by the patient. This is a new problem. Episode onset: 5 days  Episode frequency: intermittent sharp pain lasting 5 sec then resolves, currently rates pain 5/10. The problem has been gradually worsening. Associated symptoms include abdominal pain. Pertinent negatives include no chest pain, no headaches and no shortness of breath. Associated symptoms comments: Reports reflux symptoms have improved. Has been having recurrent sharp abd pain, worse on the right lower side. Comments at times when he sits pain feels like pain radiates to the flank area. Nothing aggravates the symptoms. Nothing relieves the symptoms. He has tried nothing for the symptoms. Allergies   Allergen Reactions    Tylenol Cold [Btw-Vombnlaqk-Is-Acetaminophen] Swelling     Current Outpatient Medications   Medication Sig Dispense Refill    fenofibrate nanocrystallized (TRICOR) 145 mg tablet TAKE 1 TABLET BY MOUTH  DAILY 90 Tab 0    amLODIPine-benazepril (LOTREL) 5-20 mg per capsule TAKE 1 CAPSULE BY MOUTH  DAILY 90 Cap 0    metFORMIN ER (GLUCOPHAGE XR) 500 mg tablet TAKE 2 TABLETS BY MOUTH  DAILY WITH DINNER 180 Tab 0    glucose blood VI test strips (BLOOD GLUCOSE TEST) strip Check glucose twice daily; to use with freestyle meter 200 Strip 3    insulin glargine (LANTUS,BASAGLAR) 100 unit/mL (3 mL) inpn 20 Units by SubCUTAneous route nightly.  90 mL 3    Insulin Needles, Disposable, 31 gauge x 5/16\" ndle To use with lantus solostar 90 Pen Needle 11    Blood-Glucose Meter monitoring kit One touch mini 1 kit 0     Past Medical History:   Diagnosis Date    [facility] 2 [facility] 9/25/2014    Kidney stone 9/26/2012    Kidney stones      Social History     Socioeconomic History    Marital status: SINGLE     Spouse name: Not on file    Number of children: Not on file    Years of education: Not on file    Highest education level: Not on file   Social Needs    Financial resource strain: Not on file    Food insecurity - worry: Not on file    Food insecurity - inability: Not on file   [employer] needs - medical: Not on file   [employer] needs - non-medical: Not on file   Occupational History    Not on file   Tobacco Use    Smoking status: Never Smoker    Smokeless tobacco: Never Used   Substance and Sexual Activity    Alcohol use: Yes    Drug use: Not on file    Sexual activity: Not on file   Other Topics Concern    Not on file   Social History Narrative    Not on file     Review of Systems   Constitutional: Negative for chills and fever. Respiratory: Negative for shortness of breath. Cardiovascular: Negative for chest pain. Gastrointestinal: Positive for abdominal pain and heartburn. Negative for nausea. Neurological: Negative for headaches. BP (!) 170/108 (BP 1 Location: Right arm, BP Patient Position: Sitting) Comment: took medication 1 1/2 hrs ago  Pulse 96   Temp 98.1 °F (36.7 °C) (Oral)   Resp 16   Ht 5' 11\" (1.803 m)   Wt (!) 352 lb 3.2 oz (159.8 kg)   SpO2 97%   BMI 49.12 kg/m²   Physical Exam   Constitutional: He appears well-developed and well-nourished. HENT:   Head: Normocephalic and atraumatic. Neck: Normal range of motion. Neck supple. Cardiovascular: Normal rate, regular rhythm and normal heart sounds. Exam reveals no gallop and no friction rub. No murmur heard. Pulmonary/Chest: Effort normal and breath sounds normal. He has no wheezes. He has no rhonchi. He has no rales. Abdominal: Soft. Bowel sounds are normal. There is tenderness in the right lower quadrant and epigastric area. There is no rebound. Musculoskeletal: He exhibits no edema. Skin: Skin is warm and dry. ASSESSMENT and PLAN    ICD-10-CM ICD-9-CM    1. Right lower quadrant abdominal pain R10.31 789.03 CT ABD PELV W CONT      CBC WITH AUTOMATED DIFF      METABOLIC PANEL, COMPREHENSIVE   2.  Right flank pain R10.9 789.09 AMB POC URINALYSIS DIP STICK AUTO W/O MICRO      CULTURE, URINE   3. Epigastric pain R10.13 789.06 CT ABD PELV W CONT      CBC WITH AUTOMATED DIFF      METABOLIC PANEL, COMPREHENSIVE   4. Gastroesophageal reflux disease, esophagitis presence not specified K21.9 530.81    5. Encounter for immunization Z23 V03.89 PNEUMOCOCCAL POLYSACCHARIDE VACCINE, 23-VALENT, ADULT OR IMMUNOSUPPRESSED PT DOSE,     Orders Placed This Encounter    CULTURE, URINE    CT ABD PELV W CONT    Pneumococcal polysaccharide vaccine, 23-valent, adult or immunosuppressed pt dose    CBC WITH AUTOMATED DIFF    METABOLIC PANEL, COMPREHENSIVE    AMB POC URINALYSIS DIP STICK AUTO W/O MICRO    omeprazole (PRILOSEC) 20 mg capsule     alarm signs when to seek emergent care provided and reviewed   I have discussed the diagnosis with the patient and the intended plan as seen in the above orders. The patient has received an after-visit summary and questions were answered concerning future plans. I have discussed medication side effects and warnings with the patient as well. Patient agreeable with above plan and verbalizes understanding. Follow-up Disposition:  Return in about 2 weeks (around 3/25/2019) for abd pain .

## 2019-03-11 NOTE — PROGRESS NOTES
Pt is here for abdominal pain & reflux x 5 days ago    1. Have you been to the ER, urgent care clinic since your last visit? Hospitalized since your last visit? No    2. Have you seen or consulted any other health care providers outside of the [address] [place] [address] since your last visit? Include any pap smears or colon screening. No      Pt given pneumovas 23  vaccine in R deltoid per verbral read back order JALEN Murry. Pt tolerated procedure with wait time w/o reaction.

## 2019-03-11 NOTE — PATIENT INSTRUCTIONS
Vaccine Information Statement    Pneumococcal Polysaccharide Vaccine: What You Need to Know    Many Vaccine Information Statements are available in Khmer and other languages. See www.immunize.org/vis. Hojas de información Sobre Vacunas están disponibles en español y en muchos otros idiomas. Visite Juan Luis.si. 1. Why get vaccinated? Vaccination can protect older adults (and some children and younger adults) from pneumococcal disease. Pneumococcal disease is caused by bacteria that can spread from person to person through close contact. It can cause ear infections, and it can also lead to more serious infections of the:   Lungs (pneumonia),   Blood (bacteremia), and   Covering of the brain and spinal cord (meningitis). Meningitis can cause deafness and brain damage, and it can be fatal.      Anyone can get pneumococcal disease, but children under 3years of age, people with certain medical conditions, adults over 72years of age, and cigarette smokers are at the highest risk. About 18,000 older adults die each year from pneumococcal disease in the [place]. Treatment of pneumococcal infections with penicillin and other drugs used to be more effective. But some strains of the disease have become resistant to these drugs. This makes prevention of the disease, through vaccination, even more important. 2. Pneumococcal polysaccharide vaccine (PPSV23)    Pneumococcal polysaccharide vaccine (PPSV23) protects against 23 types of pneumococcal bacteria. It will not prevent all pneumococcal disease. PPSV23 is recommended for:   All adults 72years of age and older,   Anyone 2 through 59years of age with certain long-term health problems,   Anyone 2 through 59years of age with a weakened immune system,   Adults 23 through 59years of age who smoke cigarettes or have asthma. Most people need only one dose of PPSV.   A second dose is recommended for certain high-risk groups. People 72 and older should get a dose even if they have gotten one or more doses of the vaccine before they turned 65. Your healthcare provider can give you more information about these recommendations. Most healthy adults develop protection within 2 to 3 weeks of getting the shot. 3. Some people should not get this vaccine     Anyone who has had a life-threatening allergic reaction to PPSV should not get another dose.  Anyone who has a severe allergy to any component of PPSV should not receive it. Tell your provider if you have any severe allergies.  Anyone who is moderately or severely ill when the shot is scheduled may be asked to wait until they recover before getting the vaccine. Someone with a mild illness can usually be vaccinated.  Children less than 3years of age should not receive this vaccine.  There is no evidence that PPSV is harmful to either a pregnant woman or to her fetus. However, as a precaution, women who need the vaccine should be vaccinated before becoming pregnant, if possible. 4. Risks of a vaccine reaction    With any medicine, including vaccines, there is a chance of side effects. These are usually mild and go away on their own, but serious reactions are also possible. About half of people who get PPSV have mild side effects, such as redness or pain where the shot is given, which go away within about two days. Less than 1 out of 100 people develop a fever, muscle aches, or more severe local reactions. Problems that could happen after any vaccine:     People sometimes faint after a medical procedure, including vaccination. Sitting or lying down for about 15 minutes can help prevent fainting, and injuries caused by a fall. Tell your doctor if you feel dizzy, or have vision changes or ringing in the ears.  Some people get severe pain in the shoulder and have difficulty moving the arm where a shot was given.  This happens very rarely.  Any medication can cause a severe allergic reaction. Such reactions from a vaccine are very rare, estimated at about 1 in a million doses, and would happen within a few minutes to a few hours after the vaccination. As with any medicine, there is a very remote chance of a vaccine causing a serious injury or death. The safety of vaccines is always being monitored. For more information, visit: www.cdc.gov/vaccinesafety/     5. What if there is a serious reaction? What should I look for? Look for anything that concerns you, such as signs of a severe allergic reaction, very high fever, or unusual behavior. Signs of a severe allergic reaction can include hives, swelling of the face and throat, difficulty breathing, a fast heartbeat, dizziness, and weakness. These would usually start a few minutes to a few hours after the vaccination. What should I do? If you think it is a severe allergic reaction or other emergency that cant wait, call 9-1-1 or get to the nearest hospital. Otherwise, call your doctor. Afterward, the reaction should be reported to the Vaccine Adverse Event Reporting System (VAERS). Your doctor might file this report, or you can do it yourself through the VAERS web site at www.vaers. [facility].gov, or by calling 3-200.646.9357. [employer] does not give medical advice. 6. How can I learn more?  Ask your doctor. He or she can give you the vaccine package insert or suggest other sources of information.  Call your local or state health department.  Contact the Centers for Disease Control and Prevention (CDC):  - Call 2-929.716.5359 (1-800-CDC-INFO) or  - Visit [facility]s website at [employer] 18 04/24/2015    [facility] for Disease Control and Prevention    Office Use Only       Abdominal Pain: Care Instructions  Your Care Instructions    Abdominal pain has many possible causes.  Some aren't serious and get better on their own in a few days. Others need more testing and treatment. If your pain continues or gets worse, you need to be rechecked and may need more tests to find out what is wrong. You may need surgery to correct the problem. Don't ignore new symptoms, such as fever, nausea and vomiting, urination problems, pain that gets worse, and dizziness. These may be signs of a more serious problem. Your doctor may have recommended a follow-up visit in the next 8 to 12 hours. If you are not getting better, you may need more tests or treatment. The doctor has checked you carefully, but problems can develop later. If you notice any problems or new symptoms, get medical treatment right away. Follow-up care is a key part of your treatment and safety. Be sure to make and go to all appointments, and call your doctor if you are having problems. It's also a good idea to know your test results and keep a list of the medicines you take. How can you care for yourself at home? · Rest until you feel better. · To prevent dehydration, drink plenty of fluids, enough so that your urine is light yellow or clear like water. Choose water and other caffeine-free clear liquids until you feel better. If you have kidney, heart, or liver disease and have to limit fluids, talk with your doctor before you increase the amount of fluids you drink. · If your stomach is upset, eat mild foods, such as rice, dry toast or crackers, bananas, and applesauce. Try eating several small meals instead of two or three large ones. · Wait until 48 hours after all symptoms have gone away before you have spicy foods, alcohol, and drinks that contain caffeine. · Do not eat foods that are high in fat. · Avoid anti-inflammatory medicines such as aspirin, ibuprofen (Advil, Motrin), and naproxen (Aleve). These can cause stomach upset. Talk to your doctor if you take daily aspirin for another health problem.   When should you call for help?  Call 911 anytime you think you may need emergency care. For example, call if:    · You passed out (lost consciousness).     · You pass maroon or very bloody stools.     · You vomit blood or what looks like coffee grounds.     · You have new, severe belly pain.    Call your doctor now or seek immediate medical care if:    · Your pain gets worse, especially if it becomes focused in one area of your belly.     · You have a new or higher fever.     · Your stools are black and look like tar, or they have streaks of blood.     · You have unexpected vaginal bleeding.     · You have symptoms of a urinary tract infection. These may include:  ? Pain when you urinate. ? Urinating more often than usual.  ? Blood in your urine.     · You are dizzy or lightheaded, or you feel like you may faint.    Watch closely for changes in your health, and be sure to contact your doctor if:    · You are not getting better after 1 day (24 hours). Where can you learn more? Go to http://ermelinda-yrn.info/. Enter N537 in the search box to learn more about \"Abdominal Pain: Care Instructions. \"  Current as of: September 23, 2018  Content Version: 11.9  © 5942-1043 [employer]. Care instructions adapted under license by [employer] (which disclaims liability or warranty for this information). If you have questions about a medical condition or this instruction, always ask your healthcare professional. [place] [address] any warranty or liability for your use of this information.

## 2019-03-12 LAB
A-G RATIO,AGRAT: 1.9 RATIO (ref 1.1–2.6)
ALBUMIN SERPL-MCNC: 4.8 G/DL (ref 3.5–5)
ALP SERPL-CCNC: 91 U/L (ref 25–115)
ALT SERPL-CCNC: 40 U/L (ref 5–40)
ANION GAP SERPL CALC-SCNC: 13 MMOL/L
AST SERPL W P-5'-P-CCNC: 16 U/L (ref 10–37)
BILIRUB SERPL-MCNC: 0.5 MG/DL (ref 0.2–1.2)
BUN SERPL-MCNC: 14 MG/DL (ref 6–22)
CALCIUM SERPL-MCNC: 10.3 MG/DL (ref 8.4–10.4)
CHLORIDE SERPL-SCNC: 96 MMOL/L (ref 98–110)
CO2 SERPL-SCNC: 29 MMOL/L (ref 20–32)
CREAT SERPL-MCNC: 0.9 MG/DL (ref 0.5–1.2)
GFRAA, 66117: >60
GFRNA, 66118: >60
GLOBULIN,GLOB: 2.5 G/DL (ref 2–4)
GLUCOSE SERPL-MCNC: 359 MG/DL (ref 70–99)
POTASSIUM SERPL-SCNC: 4.5 MMOL/L (ref 3.5–5.5)
PROT SERPL-MCNC: 7.3 G/DL (ref 6.4–8.3)
RESULT: ABNORMAL
SODIUM SERPL-SCNC: 138 MMOL/L (ref 133–145)

## 2019-03-16 ENCOUNTER — HOSPITAL ENCOUNTER (OUTPATIENT)
Dept: CT IMAGING | Age: 34
Discharge: HOME | End: 2019-03-16
Attending: NURSE PRACTITIONER
Payer: COMMERCIAL

## 2019-03-16 DIAGNOSIS — R10.13 EPIGASTRIC PAIN: ICD-10-CM

## 2019-03-16 DIAGNOSIS — R10.31 RIGHT LOWER QUADRANT ABDOMINAL PAIN: ICD-10-CM

## 2019-03-16 PROCEDURE — 74177 CT ABD & PELVIS W/CONTRAST: CPT

## 2019-03-16 PROCEDURE — 74011636320 HC RX REV CODE- 636/320: Performed by: NURSE PRACTITIONER

## 2019-03-16 RX ADMIN — IOPAMIDOL 100 ML: 612 INJECTION, SOLUTION INTRAVENOUS at 08:27

## 2019-03-25 ENCOUNTER — OFFICE VISIT (OUTPATIENT)
Dept: FAMILY MEDICINE CLINIC | Age: 34
End: 2019-03-25

## 2019-03-25 VITALS
SYSTOLIC BLOOD PRESSURE: 144 MMHG | DIASTOLIC BLOOD PRESSURE: 102 MMHG | TEMPERATURE: 98.6 F | HEIGHT: 71 IN | OXYGEN SATURATION: 100 % | WEIGHT: 315 LBS | RESPIRATION RATE: 16 BRPM | HEART RATE: 91 BPM | BODY MASS INDEX: 44.1 KG/M2

## 2019-03-25 DIAGNOSIS — E66.01 OBESITY, CLASS III, BMI 40-49.9 (MORBID OBESITY) (HCC): ICD-10-CM

## 2019-03-25 DIAGNOSIS — R10.31 RIGHT LOWER QUADRANT ABDOMINAL PAIN: ICD-10-CM

## 2019-03-25 DIAGNOSIS — R82.71 GROUP B STREPTOCOCCAL BACTERIURIA: Primary | ICD-10-CM

## 2019-03-25 DIAGNOSIS — I10 ESSENTIAL HYPERTENSION: ICD-10-CM

## 2019-03-25 RX ORDER — PENICILLIN V POTASSIUM 500 MG/1
500 TABLET, FILM COATED ORAL 4 TIMES DAILY
Qty: 40 TAB | Refills: 0 | Status: SHIPPED | OUTPATIENT
Start: 2019-03-25 | End: 2019-04-04

## 2019-03-25 RX ORDER — AMLODIPINE BESYLATE AND BENAZEPRIL HYDROCHLORIDE 10; 40 MG/1; MG/1
1 CAPSULE ORAL DAILY
Qty: 90 CAP | Refills: 1 | Status: SHIPPED | OUTPATIENT
Start: 2019-03-25 | End: 2019-08-16 | Stop reason: SDUPTHER

## 2019-03-25 NOTE — PATIENT INSTRUCTIONS

## 2019-03-25 NOTE — PROGRESS NOTES
Pt is here for f/u on abdominal pain. 1. Have you been to the ER, urgent care clinic since your last visit? Hospitalized since your last visit? No    2. Have you seen or consulted any other health care providers outside of the [address] [place] [address] since your last visit? Include any pap smears or colon screening.  No

## 2019-03-25 NOTE — PROGRESS NOTES
HISTORY OF PRESENT ILLNESS  Daysi Jeong is a 28 y.o. male. Patient states abd pain is still present but not as severe or frequent as it was. Taking omeprazole without any adverse effects. Allergies   Allergen Reactions    Tylenol Cold [Mzt-Cfuczybfh-Ou-Acetaminophen] Swelling     Current Outpatient Medications   Medication Sig Dispense Refill    omeprazole (PRILOSEC) 20 mg capsule Take 1 Cap by mouth Daily (before breakfast). 30 Cap 1    fenofibrate nanocrystallized (TRICOR) 145 mg tablet TAKE 1 TABLET BY MOUTH  DAILY 90 Tab 0    amLODIPine-benazepril (LOTREL) 5-20 mg per capsule TAKE 1 CAPSULE BY MOUTH  DAILY 90 Cap 0    metFORMIN ER (GLUCOPHAGE XR) 500 mg tablet TAKE 2 TABLETS BY MOUTH  DAILY WITH DINNER 180 Tab 0    glucose blood VI test strips (BLOOD GLUCOSE TEST) strip Check glucose twice daily; to use with freestyle meter 200 Strip 3    insulin glargine (LANTUS,BASAGLAR) 100 unit/mL (3 mL) inpn 20 Units by SubCUTAneous route nightly. 90 mL 3    Insulin Needles, Disposable, 31 gauge x 5/16\" ndle To use with lantus solostar 90 Pen Needle 11    Blood-Glucose Meter monitoring kit One touch mini 1 kit 0     Past Medical History:   Diagnosis Date    Diabetes mellitus, type 2 ([facility] [address] 9/25/2014    Kidney stone 9/26/2012    Kidney stones      Social History     Socioeconomic History    Marital status: SINGLE     Spouse name: Not on file    Number of children: Not on file    Years of education: Not on file    Highest education level: Not on file   Occupational History    Not on file   Social Needs    Financial resource strain: Not on file    Food insecurity:     Worry: Not on file     Inability: Not on file    Transportation needs:     Medical: Not on file     Non-medical: Not on file   Tobacco Use    Smoking status: Never Smoker    Smokeless tobacco: Never Used   Substance and Sexual Activity    Alcohol use:  Yes    Drug use: Not on file    Sexual activity: Not on file   Lifestyle    Physical activity:     Days per week: Not on file     Minutes per session: Not on file    Stress: Not on file   Relationships    Social connections:     Talks on phone: Not on file     Gets together: Not on file     Attends Buddhist service: Not on file     Active member of club or organization: Not on file     Attends meetings of clubs or organizations: Not on file     Relationship status: Not on file    Intimate partner violence:     Fear of current or ex partner: Not on file     Emotionally abused: Not on file     Physically abused: Not on file     Forced sexual activity: Not on file   Other Topics Concern    Not on file   Social History Narrative    Not on file     Review of Systems   Constitutional: Negative for chills and fever. Respiratory: Negative for shortness of breath. Cardiovascular: Negative for chest pain and leg swelling. Gastrointestinal: Positive for abdominal pain. Negative for nausea and vomiting. BP (!) 149/102 (BP 1 Location: Left arm)   Pulse 91   Temp 98.6 °F (37 °C) (Oral)   Resp 16   Ht 5' 11\" (1.803 m)   Wt (!) 351 lb 9.6 oz (159.5 kg)   SpO2 100%   BMI 49.04 kg/m²   Physical Exam   Constitutional: He appears well-developed and well-nourished. HENT:   Head: Normocephalic and atraumatic. Neck: Normal range of motion. Neck supple. Cardiovascular: Normal rate, regular rhythm and normal heart sounds. Exam reveals no gallop and no friction rub. No murmur heard. Pulmonary/Chest: Effort normal and breath sounds normal. He has no wheezes. He has no rhonchi. He has no rales. Abdominal: Soft. Bowel sounds are normal. There is tenderness (mild) in the right lower quadrant. Lymphadenopathy:     He has no cervical adenopathy. Skin: Skin is warm and dry. ASSESSMENT and PLAN    ICD-10-CM ICD-9-CM    1. Group B streptococcal bacteriuria R82.71 599.0      041.02    2.  Right lower quadrant abdominal pain R10.31 789.03 REFERRAL TO GASTROENTEROLOGY   3. Essential hypertension I10 401.9    4. Obesity, Class III, BMI 40-49.9 (morbid obesity) ([facility]) E66.01 278.01      Orders Placed This Encounter    REFERRAL TO GASTROENTEROLOGY    penicillin v potassium (VEETID) 500 mg tablet    amLODIPine-benazepril (LOTREL) 10-40 mg per capsule     htn not completely controlled, increase lotrel to 10/40 daily, informed to take 2 tabs of current capsules, will send over new dosage to mail order  I have discussed the diagnosis with the patient and the intended plan as seen in the above orders. The patient has received an after-visit summary and questions were answered concerning future plans. I have discussed medication side effects and warnings with the patient as well. Patient agreeable with above plan and verbalizes understanding. Follow-up and Dispositions    · Return in about 2 months (around 5/25/2019) for DM/HTN.

## 2019-04-08 RX ORDER — FENOFIBRATE 145 MG/1
TABLET, COATED ORAL
Qty: 90 TAB | Refills: 0 | Status: SHIPPED | OUTPATIENT
Start: 2019-04-08 | End: 2019-08-16 | Stop reason: SDUPTHER

## 2019-05-16 RX ORDER — METFORMIN HYDROCHLORIDE 500 MG/1
TABLET, EXTENDED RELEASE ORAL
Qty: 180 TAB | Refills: 0 | Status: SHIPPED | OUTPATIENT
Start: 2019-05-16 | End: 2019-08-16 | Stop reason: SDUPTHER

## 2019-08-19 RX ORDER — AMLODIPINE BESYLATE AND BENAZEPRIL HYDROCHLORIDE 10; 40 MG/1; MG/1
CAPSULE ORAL
Qty: 90 CAP | Refills: 1 | Status: SHIPPED | OUTPATIENT
Start: 2019-08-19 | End: 2020-01-20

## 2019-08-19 RX ORDER — METFORMIN HYDROCHLORIDE 500 MG/1
TABLET, EXTENDED RELEASE ORAL
Qty: 180 TAB | Refills: 0 | Status: SHIPPED | OUTPATIENT
Start: 2019-08-19 | End: 2019-11-23 | Stop reason: SDUPTHER

## 2019-08-19 RX ORDER — FENOFIBRATE 145 MG/1
TABLET, COATED ORAL
Qty: 90 TAB | Refills: 0 | Status: SHIPPED | OUTPATIENT
Start: 2019-08-19 | End: 2019-08-22 | Stop reason: SDUPTHER

## 2019-10-18 DIAGNOSIS — E11.65 TYPE 2 DIABETES MELLITUS WITH HYPERGLYCEMIA, WITH LONG-TERM CURRENT USE OF INSULIN (HCC): ICD-10-CM

## 2019-10-18 DIAGNOSIS — Z79.4 TYPE 2 DIABETES MELLITUS WITH HYPERGLYCEMIA, WITH LONG-TERM CURRENT USE OF INSULIN (HCC): ICD-10-CM

## 2019-11-01 DIAGNOSIS — E11.65 TYPE 2 DIABETES MELLITUS WITH HYPERGLYCEMIA, WITHOUT LONG-TERM CURRENT USE OF INSULIN (HCC): ICD-10-CM

## 2019-11-04 RX ORDER — INSULIN GLARGINE 100 [IU]/ML
INJECTION, SOLUTION SUBCUTANEOUS
Qty: 30 ML | Refills: 1 | Status: SHIPPED | OUTPATIENT
Start: 2019-11-04 | End: 2020-07-08

## 2019-11-04 NOTE — TELEPHONE ENCOUNTER
Please inform patient he needs to follow up for his DM before any further refills after current. Thanks!

## 2019-11-25 RX ORDER — METFORMIN HYDROCHLORIDE 500 MG/1
TABLET, EXTENDED RELEASE ORAL
Qty: 180 TAB | Refills: 0 | Status: SHIPPED | OUTPATIENT
Start: 2019-11-25 | End: 2020-04-06

## 2019-12-20 RX ORDER — FENOFIBRATE 145 MG/1
TABLET, COATED ORAL
Qty: 90 TAB | Refills: 0 | Status: SHIPPED | OUTPATIENT
Start: 2019-12-20 | End: 2020-02-12

## 2019-12-27 ENCOUNTER — OFFICE VISIT (OUTPATIENT)
Dept: FAMILY MEDICINE CLINIC | Age: 34
End: 2019-12-27

## 2019-12-27 VITALS
HEIGHT: 71 IN | SYSTOLIC BLOOD PRESSURE: 136 MMHG | OXYGEN SATURATION: 98 % | HEART RATE: 87 BPM | DIASTOLIC BLOOD PRESSURE: 86 MMHG | TEMPERATURE: 98.3 F | WEIGHT: 315 LBS | BODY MASS INDEX: 44.1 KG/M2 | RESPIRATION RATE: 18 BRPM

## 2019-12-27 DIAGNOSIS — I10 ESSENTIAL HYPERTENSION: ICD-10-CM

## 2019-12-27 DIAGNOSIS — E78.00 PURE HYPERCHOLESTEROLEMIA: ICD-10-CM

## 2019-12-27 DIAGNOSIS — E66.01 OBESITY, CLASS III, BMI 40-49.9 (MORBID OBESITY) (HCC): ICD-10-CM

## 2019-12-27 DIAGNOSIS — E11.65 TYPE 2 DIABETES MELLITUS WITH HYPERGLYCEMIA, WITH LONG-TERM CURRENT USE OF INSULIN (HCC): Primary | ICD-10-CM

## 2019-12-27 DIAGNOSIS — Z79.4 TYPE 2 DIABETES MELLITUS WITH HYPERGLYCEMIA, WITH LONG-TERM CURRENT USE OF INSULIN (HCC): Primary | ICD-10-CM

## 2019-12-27 LAB
HBA1C MFR BLD HPLC: 8.6 %
LDL-C, EXTERNAL: 111
MICROALBUMIN UR TEST STR-MCNC: 58 MG/DL

## 2019-12-27 NOTE — PATIENT INSTRUCTIONS
Diabetes Foot Health: Care Instructions Your Care Instructions When you have diabetes, your feet need extra care and attention. Diabetes can damage the nerve endings and blood vessels in your feet, making you less likely to notice when your feet are injured. Diabetes also limits your body's ability to fight infection and get blood to areas that need it. If you get a minor foot injury, it could become an ulcer or a serious infection. With good foot care, you can prevent most of these problems. Caring for your feet can be quick and easy. Most of the care can be done when you are bathing or getting ready for bed. Follow-up care is a key part of your treatment and safety. Be sure to make and go to all appointments, and call your doctor if you are having problems. It's also a good idea to know your test results and keep a list of the medicines you take. How can you care for yourself at home? · Keep your blood sugar close to normal by watching what and how much you eat, monitoring blood sugar, taking medicines if prescribed, and getting regular exercise. · Do not smoke. Smoking affects blood flow and can make foot problems worse. If you need help quitting, talk to your doctor about stop-smoking programs and medicines. These can increase your chances of quitting for good. · Eat a diet that is low in fats. High fat intake can cause fat to build up in your blood vessels and decrease blood flow. · Inspect your feet daily for blisters, cuts, cracks, or sores. If you cannot see well, use a mirror or have someone help you. · Take care of your feet: 
? Wash your feet every day. Use warm (not hot) water. Check the water temperature with your wrists or other part of your body, not your feet. ? Dry your feet well. Pat them dry. Do not rub the skin on your feet too hard. Dry well between your toes. If the skin on your feet stays moist, bacteria or a fungus can grow, which can lead to infection. ? Keep your skin soft. Use moisturizing skin cream to keep the skin on your feet soft and prevent calluses and cracks. But do not put the cream between your toes, and stop using any cream that causes a rash. ? Clean underneath your toenails carefully. Do not use a sharp object to clean underneath your toenails. Use the blunt end of a nail file or other rounded tool. ? Trim and file your toenails straight across to prevent ingrown toenails. Use a nail clipper, not scissors. Use an emery board to smooth the edges. · Change socks daily. Socks without seams are best, because seams often rub the feet. You can find socks for people with diabetes from specialty catalogs. · Look inside your shoes every day for things like gravel or torn linings, which could cause blisters or sores. · Buy shoes that fit well: 
? Look for shoes that have plenty of space around the toes. This helps prevent bunions and blisters. ? Try on shoes while wearing the kind of socks you will usually wear with the shoes. ? Avoid plastic shoes. They may rub your feet and cause blisters. Good shoes should be made of materials that are flexible and breathable, such as leather or cloth. ? Break in new shoes slowly by wearing them for no more than an hour a day for several days. Take extra time to check your feet for red areas, blisters, or other problems after you wear new shoes. · Do not go barefoot. Do not wear sandals, and do not wear shoes with very thin soles. Thin soles are easy to puncture. They also do not protect your feet from hot pavement or cold weather. · Have your doctor check your feet during each visit. If you have a foot problem, see your doctor. Do not try to treat an early foot problem at home. Home remedies or treatments that you can buy without a prescription (such as corn removers) can be harmful. · Always get early treatment for foot problems. A minor irritation can lead to a major problem if not properly cared for early. When should you call for help? Call your doctor now or seek immediate medical care if: 
  · You have a foot sore, an ulcer or break in the skin that is not healing after 4 days, bleeding corns or calluses, or an ingrown toenail.  
  · You have blue or black areas, which can mean bruising or blood flow problems.  
  · You have peeling skin or tiny blisters between your toes or cracking or oozing of the skin.  
  · You have a fever for more than 24 hours and a foot sore.  
  · You have new numbness or tingling in your feet that does not go away after you move your feet or change positions.  
  · You have unexplained or unusual swelling of the foot or ankle.  
 Watch closely for changes in your health, and be sure to contact your doctor if: 
  · You cannot do proper foot care. Where can you learn more? Go to http://ermelinda-yrn.info/. Enter A739 in the search box to learn more about \"Diabetes Foot Health: Care Instructions. \" Current as of: April 16, 2019 Content Version: 12.2 © 5594-1912 [employer]. Care instructions adapted under license by [employer] (which disclaims liability or warranty for this information). If you have questions about a medical condition or this instruction, always ask your healthcare professional. [address] any warranty or liability for your use of this information.

## 2019-12-27 NOTE — PROGRESS NOTES
Subjective:    Marquis Pillai is a 27y.o. year old male seen for follow up of diabetes. Patient states he just received his dexcom Monday 12/23/19. Comments his fasting glucose has not been below 180. Currently glucose is 200  He also has hypertension, hyperlipidemia and obesity. Diabetic Review of Systems - medication compliance: compliant all of the time, diabetic diet compliance: compliant most of the time, home glucose monitoring: is performed sporadically, fasting values range 220-230 when he would check his glucose, would check every other day, further diabetic ROS: no polyuria or polydipsia, no chest pain, dyspnea or TIA's, no numbness, tingling or pain in extremities, nocutria, has improved over the past couple of nights, last eye exam 2/5/19. Other symptoms and concerns: none. Patient Active Problem List   Diagnosis Code    Motion sickness T75. 3XXA    Kidney stone N20.0    Diabetes mellitus, type 2 ([facility] [address] E11.9    Cellulitis, scrotum N49.2    Sepsis ([facility]) A41.9    Cellulitis, perineum L03.315    Hyperglycemia due to type 2 diabetes mellitus ([facility] [address] E11.65    Hypokalemia E87.6    Obesity, morbid ([facility]) E66.01    Type 2 diabetes with nephropathy ([facility]) E11.21     Current Outpatient Medications   Medication Sig Dispense Refill    fenofibrate nanocrystallized (TRICOR) 145 mg tablet TAKE 1 TABLET BY MOUTH  DAILY 90 Tab 0    metFORMIN ER (GLUCOPHAGE XR) 500 mg tablet TAKE 2 TABLETS BY MOUTH  DAILY WITH DINNER 180 Tab 0    LANTUS SOLOSTAR U-100 INSULIN 100 unit/mL (3 mL) inpn INJECT SUBCUTANEOUSLY 20  UNITS NIGHTLY 30 mL 1    ONETOUCH ULTRA BLUE TEST STRIP strip TEST TWO TIMES DAILY 200 Strip 3    amLODIPine-benazepril (LOTREL) 10-40 mg per capsule TAKE 1 CAPSULE BY MOUTH  DAILY 90 Cap 1    omeprazole (PRILOSEC) 20 mg capsule Take 1 Cap by mouth Daily (before breakfast).  30 Cap 1    Insulin Needles, Disposable, 31 gauge x 5/16\" ndle To use with lantus solostar 90 Pen Needle 11    Blood-Glucose Meter monitoring kit One touch mini 1 kit 0      Allergies   Allergen Reactions    Tylenol Cold [Oxc-Cspgjefax-Sh-Acetaminophen] Swelling     Past Surgical History:   Procedure Laterality Date    HX KNEE ARTHROSCOPY       Family History   Problem Relation Age of Onset    Cancer Father     Hypertension Father     Diabetes Father     Cancer Paternal Uncle     Hypertension Mother       Lab Results   Component Value Date/Time    Cholesterol, total 183 01/11/2019 08:34 AM    HDL Cholesterol 34 (L) 01/11/2019 08:34 AM    LDL,Direct 92 01/11/2019 08:34 AM    LDL, calculated  01/11/2019 08:34 AM      Comment:      Triglyceride >400. LDL cannot be calculated. LDL Cholesterol Direct has been  ordered. VLDL, calculated 93 (H) 01/11/2019 08:34 AM    Triglyceride 465 (H) 01/11/2019 08:34 AM     Lab Results   Component Value Date/Time    Sodium 138 03/11/2019 09:01 AM    Sodium 138 03/11/2019 09:01 AM    Potassium 4.5 03/11/2019 09:01 AM    Potassium 4.5 03/11/2019 09:01 AM    Chloride 96 (L) 03/11/2019 09:01 AM    Chloride 96 (L) 03/11/2019 09:01 AM    CO2 29 03/11/2019 09:01 AM    CO2 29 03/11/2019 09:01 AM    Anion gap 13.0 03/11/2019 09:01 AM    Anion gap 13.0 03/11/2019 09:01 AM    Glucose 359 (H) 03/11/2019 09:01 AM    Glucose 359 (H) 03/11/2019 09:01 AM    BUN 14 03/11/2019 09:01 AM    BUN 14 03/11/2019 09:01 AM    Creatinine 0.9 03/11/2019 09:01 AM    Creatinine 0.9 03/11/2019 09:01 AM    BUN/Creatinine ratio 16 03/23/2015 09:17 AM    GFR est AA >60 06/24/2018 06:01 AM    GFR est non-AA >60 06/24/2018 06:01 AM    Calcium 10.3 03/11/2019 09:01 AM    Calcium 10.3 03/11/2019 09:01 AM    Bilirubin, total 0.5 03/11/2019 09:01 AM    Bilirubin, total 0.5 03/11/2019 09:01 AM    AST (SGOT) 16 03/11/2019 09:01 AM    AST (SGOT) 16 03/11/2019 09:01 AM    Alk. phosphatase 91 03/11/2019 09:01 AM    Alk.  phosphatase 91 03/11/2019 09:01 AM    Protein, total 7.3 03/11/2019 09:01 AM    Protein, total 7.3 03/11/2019 09:01 AM    Albumin 4.8 03/11/2019 09:01 AM    Albumin 4.8 03/11/2019 09:01 AM    Globulin 2.5 03/11/2019 09:01 AM    Globulin 2.5 03/11/2019 09:01 AM    A-G Ratio 1.9 03/11/2019 09:01 AM    A-G Ratio 1.9 03/11/2019 09:01 AM    ALT (SGPT) 40 03/11/2019 09:01 AM    ALT (SGPT) 40 03/11/2019 09:01 AM     Lab Results   Component Value Date/Time    WBC 8.1 03/11/2019 09:01 AM    HGB 16.5 03/11/2019 09:01 AM    HCT 46.3 03/11/2019 09:01 AM    PLATELET 350 61/26/6180 09:01 AM    MCV 85 03/11/2019 09:01 AM     Lab Results   Component Value Date/Time    Hemoglobin A1c 8.8 (H) 01/11/2019 08:34 AM    Hemoglobin A1c (POC) 10.1 09/09/2014 12:00 PM     Lab Results   Component Value Date/Time    Microalb/Creat ratio (ug/mg creat.) 67.3 (H) 07/09/2018 09:13 AM     Wt Readings from Last 3 Encounters:   03/25/19 (!) 351 lb 9.6 oz (159.5 kg)   03/11/19 (!) 352 lb 3.2 oz (159.8 kg)   02/22/19 (!) 357 lb (161.9 kg)     Last Point of Care HGB A1C  Hemoglobin A1c (POC)   Date Value Ref Range Status   09/09/2014 10.1 % Final      BP Readings from Last 3 Encounters:   03/25/19 (!) 144/102   03/11/19 (!) 170/108   02/22/19 140/90       Results for orders placed or performed in visit on 03/11/19   CULTURE, URINE   Result Value Ref Range    RESULT Abnormal (A)    CBC WITH AUTOMATED DIFF   Result Value Ref Range    WBC 8.1 4.0 - 11.0 K/uL    RBC 5.45 3.80 - 5.80 M/uL    HGB 16.5 13.2 - 17.3 g/dL    HCT 46.3 36.6 - 51.9 %    MCV 85 80 - 95 fL    MCH 30 26 - 34 pg    MCHC 36 31 - 36 g/dL    RDW 12.6 10.0 - 15.5 %    PLATELET 650 490 - 536 K/uL    MPV 11.1 9.0 - 13.0 fL    NEUTROPHILS 65 40 - 75 %    Lymphocytes 29 20 - 45 %    MONOCYTES 4 3 - 12 %    EOSINOPHILS 2 0 - 6 %    BASOPHILS 0 0 - 2 %    ABS. NEUTROPHILS 5.2 1.8 - 7.7 K/uL    ABSOLUTE LYMPHOCYTE COUNT 2.3 1.0 - 4.8 K/uL    ABS. MONOCYTES 0.3 0.1 - 1.0 K/uL    ABS. EOSINOPHILS 0.2 0.0 - 0.5 K/uL    ABS.  BASOPHILS 0.0 0.0 - 0.2 K/uL   METABOLIC PANEL, COMPREHENSIVE   Result Value Ref Range    Glucose 359 (H) 70 - 99 mg/dL    BUN 14 6 - 22 mg/dL    Creatinine 0.9 0.5 - 1.2 mg/dL    Sodium 138 133 - 145 mmol/L    Potassium 4.5 3.5 - 5.5 mmol/L    Chloride 96 (L) 98 - 110 mmol/L    CO2 29 20 - 32 mmol/L    AST (SGOT) 16 10 - 37 U/L    ALT (SGPT) 40 5 - 40 U/L    Alk. phosphatase 91 25 - 115 U/L    Bilirubin, total 0.5 0.2 - 1.2 mg/dL    Calcium 10.3 8.4 - 10.4 mg/dL    Protein, total 7.3 6.4 - 8.3 g/dL    Albumin 4.8 3.5 - 5.0 g/dL    A-G Ratio 1.9 1.1 - 2.6 ratio    Globulin 2.5 2.0 - 4.0 g/dL    Anion gap 13.0 mmol/L    GFRAA >60.0 >60.0    GFRNA >81.0 >10.6   METABOLIC PANEL, COMPREHENSIVE   Result Value Ref Range    Glucose 359 (H) 70 - 99 mg/dL    BUN 14 6 - 22 mg/dL    Creatinine 0.9 0.5 - 1.2 mg/dL    Sodium 138 133 - 145 mmol/L    Potassium 4.5 3.5 - 5.5 mmol/L    Chloride 96 (L) 98 - 110 mmol/L    CO2 29 20 - 32 mmol/L    AST (SGOT) 16 10 - 37 U/L    ALT (SGPT) 40 5 - 40 U/L    Alk.  phosphatase 91 25 - 115 U/L    Bilirubin, total 0.5 0.2 - 1.2 mg/dL    Calcium 10.3 8.4 - 10.4 mg/dL    Protein, total 7.3 6.4 - 8.3 g/dL    Albumin 4.8 3.5 - 5.0 g/dL    A-G Ratio 1.9 1.1 - 2.6 ratio    Globulin 2.5 2.0 - 4.0 g/dL    Anion gap 13.0 mmol/L    GFRAA >60.0 >60.0    GFRNA >60.0 >60.0   AMB POC URINALYSIS DIP STICK AUTO W/O MICRO   Result Value Ref Range    Color (UA POC) Yellow     Clarity (UA POC) Clear     Glucose (UA POC) 2+ Negative    Bilirubin (UA POC) Negative Negative    Ketones (UA POC) Negative Negative    Specific gravity (UA POC) 1.015 1.001 - 1.035    Blood (UA POC) Trace Negative    pH (UA POC) 5.0 4.6 - 8.0    Protein (UA POC) 2+ Negative    Urobilinogen (UA POC) 0.2 mg/dL 0.2 - 1    Nitrites (UA POC) Negative Negative    Leukocyte esterase (UA POC) Negative Negative         Last Diabetic Foot Exam on: 8/7/18        Objective:  Visit Vitals  BP (!) 150/99 (BP 1 Location: Left arm, BP Patient Position: Sitting)   Pulse 87   Temp 98.3 °F (36.8 °C) (Oral)   Resp 18   Ht 5' 11\" (1.803 m)   Wt (!) 352 lb (159.7 kg)   SpO2 98%   BMI 49.09 kg/m²     Awake and alert in no acute distress   Neck supple without lymphadenopathy, no carotid artery bruits auscultated bilaterally. No thyromegaly   Lungs clear throughout   S1 S2 RRR without ectopy or murmur auscultated. Extremities: no clubbing, cyanosis, peripheral edema   Abdomen - soft, nontender, nondistended, no masses or organomegaly  Integumentary: no rashes   Reviewed vital signs       Diabetic foot exam:     Left Foot:   Visual Exam: normal    Pulse DP: 2+ (normal)   Filament test: normal sensation    Vibratory sensation: normal      Right Foot:   Visual Exam: normal    Pulse DP: 2+ (normal)   Filament test: normal sensation    Vibratory sensation: normal        Assessment/Plan:    ICD-10-CM ICD-9-CM    1. Type 2 diabetes mellitus with hyperglycemia, with long-term current use of insulin ([facility]) E11.65 250.00 MICROALBUMIN, UR, RAND W/ MICROALB/CREAT RATIO    Z79.4 790.29 HEMOGLOBIN A1C WITH EAG     Y86.89 METABOLIC PANEL, COMPREHENSIVE       DIABETES FOOT EXAM   2. Essential hypertension O86 509.2 METABOLIC PANEL, COMPREHENSIVE      LIPID PANEL   3. Obesity, Class III, BMI 40-49.9 (morbid obesity) ([facility]) E66.01 278.01    4. Pure hypercholesterolemia R34.64 209.3 METABOLIC PANEL, COMPREHENSIVE      LIPID PANEL       Increased to increase lantus to 30 units daily  patient poorly compliant, needs to follow diet more regularly  Issues reviewed with him: low cholesterol diet, weight control and daily exercise discussed, diabetic Sick Day rules reviewed, handout given, foot care discussed and Podiatry visits discussed and annual eye examinations at Ophthalmology discussed. I have discussed the diagnosis with the patient and the intended plan as seen in the above orders. The patient has received an after-visit summary and questions were answered concerning future plans.   I have discussed medication side effects and warnings with the patient as well. Patient agreeable with above plan and verbalizes understanding. Follow-up and Dispositions    · Return in about 6 weeks (around 2/7/2020) for DM.

## 2020-01-20 RX ORDER — AMLODIPINE BESYLATE AND BENAZEPRIL HYDROCHLORIDE 10; 40 MG/1; MG/1
CAPSULE ORAL
Qty: 90 CAP | Refills: 1 | Status: SHIPPED | OUTPATIENT
Start: 2020-01-20 | End: 2020-07-08

## 2020-02-07 ENCOUNTER — OFFICE VISIT (OUTPATIENT)
Dept: FAMILY MEDICINE CLINIC | Age: 35
End: 2020-02-07

## 2020-02-07 VITALS
WEIGHT: 315 LBS | BODY MASS INDEX: 44.1 KG/M2 | OXYGEN SATURATION: 96 % | RESPIRATION RATE: 20 BRPM | SYSTOLIC BLOOD PRESSURE: 136 MMHG | TEMPERATURE: 98.3 F | HEART RATE: 82 BPM | HEIGHT: 71 IN | DIASTOLIC BLOOD PRESSURE: 98 MMHG

## 2020-02-07 DIAGNOSIS — Z79.4 TYPE 2 DIABETES MELLITUS WITH HYPERGLYCEMIA, WITH LONG-TERM CURRENT USE OF INSULIN (HCC): Primary | ICD-10-CM

## 2020-02-07 DIAGNOSIS — I10 ESSENTIAL HYPERTENSION: ICD-10-CM

## 2020-02-07 DIAGNOSIS — E11.65 TYPE 2 DIABETES MELLITUS WITH HYPERGLYCEMIA, WITH LONG-TERM CURRENT USE OF INSULIN (HCC): Primary | ICD-10-CM

## 2020-02-07 DIAGNOSIS — E78.00 PURE HYPERCHOLESTEROLEMIA: ICD-10-CM

## 2020-02-07 DIAGNOSIS — E66.01 OBESITY, CLASS III, BMI 40-49.9 (MORBID OBESITY) (HCC): ICD-10-CM

## 2020-02-07 LAB — HBA1C MFR BLD HPLC: 6.3 %

## 2020-02-07 NOTE — PROGRESS NOTES
Chief Complaint   Patient presents with    Diabetes     1. Have you been to the ER, urgent care clinic since your last visit? Hospitalized since your last visit?no    2. Have you seen or consulted any other health care providers outside of the [address] [place] [address] since your last visit? Include any pap smears or colon screening.  no

## 2020-02-07 NOTE — PATIENT INSTRUCTIONS
Low Sodium Diet (2,000 Milligram): Care Instructions Your Care Instructions Too much sodium causes your body to hold on to extra water. This can raise your blood pressure and force your heart and kidneys to work harder. In very serious cases, this could cause you to be put in the hospital. It might even be life-threatening. By limiting sodium, you will feel better and lower your risk of serious problems. The most common source of sodium is salt. People get most of the salt in their diet from canned, prepared, and packaged foods. Fast food and restaurant meals also are very high in sodium. Your doctor will probably limit your sodium to less than 2,000 milligrams (mg) a day. This limit counts all the sodium in prepared and packaged foods and any salt you add to your food. Follow-up care is a key part of your treatment and safety. Be sure to make and go to all appointments, and call your doctor if you are having problems. It's also a good idea to know your test results and keep a list of the medicines you take. How can you care for yourself at home? Read food labels · Read labels on cans and food packages. The labels tell you how much sodium is in each serving. Make sure that you look at the serving size. If you eat more than the serving size, you have eaten more sodium. · Food labels also tell you the Percent Daily Value for sodium. Choose products with low Percent Daily Values for sodium. · Be aware that sodium can come in forms other than salt, including monosodium glutamate (MSG), sodium citrate, and sodium bicarbonate (baking soda). MSG is often added to Asian food. When you eat out, you can sometimes ask for food without MSG or added salt. Buy low-sodium foods · Buy foods that are labeled \"unsalted\" (no salt added), \"sodium-free\" (less than 5 mg of sodium per serving), or \"low-sodium\" (less than 140 mg of sodium per serving).  Foods labeled \"reduced-sodium\" and \"light sodium\" may still have too much sodium. Be sure to read the label to see how much sodium you are getting. · Buy fresh vegetables, or frozen vegetables without added sauces. Buy low-sodium versions of canned vegetables, soups, and other canned goods. Prepare low-sodium meals · Cut back on the amount of salt you use in cooking. This will help you adjust to the taste. Do not add salt after cooking. One teaspoon of salt has about 2,300 mg of sodium. · Take the salt shaker off the table. · Flavor your food with garlic, lemon juice, onion, vinegar, herbs, and spices. Do not use soy sauce, lite soy sauce, steak sauce, onion salt, garlic salt, celery salt, mustard, or ketchup on your food. · Use low-sodium salad dressings, sauces, and ketchup. Or make your own salad dressings and sauces without adding salt. · Use less salt (or none) when recipes call for it. You can often use half the salt a recipe calls for without losing flavor. Other foods such as rice, pasta, and grains do not need added salt. · Rinse canned vegetables, and cook them in fresh water. This removes somebut not allof the salt. · Avoid water that is naturally high in sodium or that has been treated with water softeners, which add sodium. Call your local water company to find out the sodium content of your water supply. If you buy bottled water, read the label and choose a sodium-free brand. Avoid high-sodium foods · Avoid eating: 
? Smoked, cured, salted, and canned meat, fish, and poultry. ? Ham, rangel, hot dogs, and luncheon meats. ? Regular, hard, and processed cheese and regular peanut butter. ? Crackers with salted tops, and other salted snack foods such as pretzels, chips, and salted popcorn. ? Frozen prepared meals, unless labeled low-sodium. ? Canned and dried soups, broths, and bouillon, unless labeled sodium-free or low-sodium. ? Canned vegetables, unless labeled sodium-free or low-sodium. ? [place] fries, pizza, tacos, and other fast foods. ? Pickles, olives, ketchup, and other condiments, especially soy sauce, unless labeled sodium-free or low-sodium. Where can you learn more? Go to http://ermelinda-yrn.info/. Enter P734 in the search box to learn more about \"Low Sodium Diet (2,000 Milligram): Care Instructions. \" Current as of: November 7, 2018 Content Version: 12.2 © 6009-7183 [employer]. Care instructions adapted under license by [employer] (which disclaims liability or warranty for this information). If you have questions about a medical condition or this instruction, always ask your healthcare professional. [address] any warranty or liability for your use of this information. How to Read a Food Label to Limit Sodium: Care Instructions Your Care Instructions Sodium causes your body to hold on to extra water. This can raise your blood pressure and force your heart and kidneys to work harder. In very serious cases, this could cause you to be put in the hospital. It might even be life-threatening. By limiting sodium, you will feel better and lower your risk of serious problems. Processed foods, fast food, and restaurant foods are the major sources of dietary sodium. The most common name for sodium is salt. Try to limit how much sodium you eat to less than 2,300 milligrams (mg) a day. If you limit your sodium to 1,500 mg a day, you can lower your blood pressure even more. This limit counts all the salt that you eat in foods you cook or in packaged foods. Keep a list of everything you eat and drink. Follow-up care is a key part of your treatment and safety. Be sure to make and go to all appointments, and call your doctor if you are having problems. It's also a good idea to know your test results and keep a list of the medicines you take. How can you care for yourself at home? Read ingredient lists on food labels · Read the list of ingredients on food labels to help you find how much sodium is in a food. The label lists the ingredients in a food in descending order (from the most to the least). If salt or sodium is high on the list, there may be a lot of sodium in the food. · Know that sodium has different names. Sodium is also called monosodium glutamate (MSG, common in [facility] food), sodium citrate, sodium alginate, sodium hydroxide, and sodium phosphate. Read Nutrition Facts labels · On most foods, there is a Nutrition Facts label. This will tell you how much sodium is in one serving of food. Look at both the serving size and the sodium amount. The serving size is located at the top of the label, usually right under the \"Nutrition Facts\" title. The amount of sodium is given in the list under the title. It is given in milligrams (mg). ? Check the serving size carefully. A single serving is often very small, and you may eat more than one serving. If this is the case, you will eat more sodium than listed on the label. For example, if the serving size for a canned soup is 1 cup and the sodium amount is 470 mg, if you have 2 cups you will eat 940 mg of sodium. · The nutrition facts for fresh fruits and vegetables are not listed on the food. They may be listed somewhere in the store. These foods usually have no sodium or low sodium. · The Nutrition Facts label also gives you the Percent Daily Value for sodium. This is how much of the recommended amount of sodium a serving contains. The daily value for sodium is less than 2,300 mg. So if the Percent Daily Value says 50%, this means one serving is giving you half of this, or 1,150 mg. Buy low-sodium foods · Look for foods that are made with less sodium. Watch for the following words on the label. ? \"Unsalted\" means there is no sodium added to the food. But there may be sodium already in the food naturally. ? \"Sodium-free\" means a serving has less than 5 mg of sodium. ? \"Very low sodium\" means a serving has 35 mg or less of sodium. ? \"Low-sodium\" means a serving has 140 mg or less of sodium. · \"Reduced-sodium\" means that there is 25% less sodium than what the food normally has. This is still usually too much sodium. Try not to buy foods with this on the label. · Buy fresh vegetables, or frozen vegetables without added sauces. Buy low-sodium versions of canned vegetables, soups, and other canned goods. Where can you learn more? Go to http://ermelindaTu [employer]osyrn.info/. Enter 26 [id number] in the search box to learn more about \"How to Read a Food Label to Limit Sodium: Care Instructions. \" Current as of: November 7, 2018 Content Version: 12.2 © 7893-3322 [employer]. Care instructions adapted under license by [employer] (which disclaims liability or warranty for this information). If you have questions about a medical condition or this instruction, always ask your healthcare professional. [place] [address] any warranty or liability for your use of this information.

## 2020-02-07 NOTE — PROGRESS NOTES
Subjective:    Qian Corrales is a 27y.o. year old male seen for follow up of diabetes. He also has hypertension and hyperlipidemia. Patient states he has made lifestyle changes and has been exercising 1-2 times per week. Diabetic Review of Systems - medication compliance: compliant all of the time, diabetic diet compliance: compliant most of the time, home glucose monitoring: is performed regularly, fasting values range 110, further diabetic ROS: no polyuria or polydipsia, no chest pain, dyspnea or TIA's, no numbness, tingling or pain in extremities, last eye exam 2/5/19, next eye exam 2/13/2020. Other symptoms and concerns: none. Patient Active Problem List   Diagnosis Code    Motion sickness T75. 3XXA    Kidney stone N20.0    Diabetes mellitus, type 2 ([facility] E11.9    Cellulitis, scrotum N49.2    Sepsis ([facility]) A41.9    Cellulitis, perineum L03.315    Hyperglycemia due to type 2 diabetes mellitus ([facility] [address] [address] E11.65    Hypokalemia E87.6    Obesity, morbid ([facility]) E66.01    Type 2 diabetes with nephropathy ([facility]) E11.21     Current Outpatient Medications   Medication Sig Dispense Refill    amLODIPine-benazepril (LOTREL) 10-40 mg per capsule TAKE 1 CAPSULE BY MOUTH  DAILY 90 Cap 1    fenofibrate nanocrystallized (TRICOR) 145 mg tablet TAKE 1 TABLET BY MOUTH  DAILY 90 Tab 0    metFORMIN ER (GLUCOPHAGE XR) 500 mg tablet TAKE 2 TABLETS BY MOUTH  DAILY WITH DINNER 180 Tab 0    LANTUS SOLOSTAR U-100 INSULIN 100 unit/mL (3 mL) inpn INJECT SUBCUTANEOUSLY 20  UNITS NIGHTLY 30 mL 1    ONETOUCH ULTRA BLUE TEST STRIP strip TEST TWO TIMES DAILY 200 Strip 3    omeprazole (PRILOSEC) 20 mg capsule Take 1 Cap by mouth Daily (before breakfast).  30 Cap 1    Insulin Needles, Disposable, 31 gauge x 5/16\" ndle To use with lantus solostar 90 Pen Needle 11    Blood-Glucose Meter monitoring kit One touch mini 1 kit 0      Allergies   Allergen Reactions    Tylenol Cold [Xcv-Jidytjwsx-Ze-Acetaminophen] Swelling     Past Surgical History:   Procedure Laterality Date    HX KNEE ARTHROSCOPY       Family History   Problem Relation Age of Onset    Cancer Father     Hypertension Father     Diabetes Father     Cancer Paternal Uncle     Hypertension Mother       Lab Results   Component Value Date/Time    Cholesterol, total 183 01/11/2019 08:34 AM    HDL Cholesterol 34 (L) 01/11/2019 08:34 AM    LDL,Direct 92 01/11/2019 08:34 AM    LDL, calculated  01/11/2019 08:34 AM      Comment:      Triglyceride >400. LDL cannot be calculated. LDL Cholesterol Direct has been  ordered. VLDL, calculated 93 (H) 01/11/2019 08:34 AM    Triglyceride 465 (H) 01/11/2019 08:34 AM     Lab Results   Component Value Date/Time    Sodium 138 03/11/2019 09:01 AM    Sodium 138 03/11/2019 09:01 AM    Potassium 4.5 03/11/2019 09:01 AM    Potassium 4.5 03/11/2019 09:01 AM    Chloride 96 (L) 03/11/2019 09:01 AM    Chloride 96 (L) 03/11/2019 09:01 AM    CO2 29 03/11/2019 09:01 AM    CO2 29 03/11/2019 09:01 AM    Anion gap 13.0 03/11/2019 09:01 AM    Anion gap 13.0 03/11/2019 09:01 AM    Glucose 359 (H) 03/11/2019 09:01 AM    Glucose 359 (H) 03/11/2019 09:01 AM    BUN 14 03/11/2019 09:01 AM    BUN 14 03/11/2019 09:01 AM    Creatinine 0.9 03/11/2019 09:01 AM    Creatinine 0.9 03/11/2019 09:01 AM    BUN/Creatinine ratio 16 03/23/2015 09:17 AM    GFR est AA >60 06/24/2018 06:01 AM    GFR est non-AA >60 06/24/2018 06:01 AM    Calcium 10.3 03/11/2019 09:01 AM    Calcium 10.3 03/11/2019 09:01 AM    Bilirubin, total 0.5 03/11/2019 09:01 AM    Bilirubin, total 0.5 03/11/2019 09:01 AM    AST (SGOT) 16 03/11/2019 09:01 AM    AST (SGOT) 16 03/11/2019 09:01 AM    Alk. phosphatase 91 03/11/2019 09:01 AM    Alk.  phosphatase 91 03/11/2019 09:01 AM    Protein, total 7.3 03/11/2019 09:01 AM    Protein, total 7.3 03/11/2019 09:01 AM    Albumin 4.8 03/11/2019 09:01 AM    Albumin 4.8 03/11/2019 09:01 AM    Globulin 2.5 03/11/2019 09:01 AM    Globulin 2.5 03/11/2019 09:01 AM    A-G Ratio 1.9 03/11/2019 09:01 AM    A-G Ratio 1.9 03/11/2019 09:01 AM    ALT (SGPT) 40 03/11/2019 09:01 AM    ALT (SGPT) 40 03/11/2019 09:01 AM     Lab Results   Component Value Date/Time    WBC 8.1 03/11/2019 09:01 AM    HGB 16.5 03/11/2019 09:01 AM    HCT 46.3 03/11/2019 09:01 AM    PLATELET 822 55/02/8415 09:01 AM    MCV 85 03/11/2019 09:01 AM     Lab Results   Component Value Date/Time    Hemoglobin A1c 8.8 (H) 01/11/2019 08:34 AM    Hemoglobin A1c (POC) 6.3 02/07/2020 08:14 AM    Hemoglobin A1c, External 8.6 12/27/2019     Lab Results   Component Value Date/Time    Microalb/Creat ratio (ug/mg creat.) 67.3 (H) 07/09/2018 09:13 AM     Wt Readings from Last 3 Encounters:   02/07/20 344 lb (156 kg)   12/27/19 (!) 352 lb (159.7 kg)   03/25/19 (!) 351 lb 9.6 oz (159.5 kg)     Last Point of Care HGB A1C  Hemoglobin A1c (POC)   Date Value Ref Range Status   02/07/2020 6.3 % Final      BP Readings from Last 3 Encounters:   02/07/20 (!) 152/102   12/27/19 136/86   03/25/19 (!) 144/102     Results for orders placed or performed in visit on 02/07/20   AMB POC HEMOGLOBIN A1C   Result Value Ref Range    Hemoglobin A1c (POC) 6.3 %     Last Diabetic Foot Exam on: 12/27/19    Objective:  Visit Vitals  BP (!) 152/102 (BP 1 Location: Right arm, BP Patient Position: Sitting)   Pulse 82   Temp 98.3 °F (36.8 °C) (Oral)   Resp 20   Ht 5' 11\" (1.803 m)   Wt 344 lb (156 kg)   SpO2 96%   BMI 47.98 kg/m²     Awake and alert in no acute distress   Neck supple without lymphadenopathy, no carotid artery bruits auscultated bilaterally. No thyromegaly   Lungs clear throughout   S1 S2 RRR without ectopy or murmur auscultated. Extremities: no clubbing, cyanosis, peripheral edema   Abdomen - soft, nontender, nondistended, no masses or organomegaly  Integumentary: no rashes   Reviewed vital signs           Assessment/Plan:    ICD-10-CM ICD-9-CM    1.  Type 2 diabetes mellitus with hyperglycemia, with long-term current use of insulin (HCC) E11.65 250.00 AMB POC HEMOGLOBIN A1C    Z79.4 790.29 LIPID PANEL     I33.13 METABOLIC PANEL, COMPREHENSIVE      HEMOGLOBIN A1C WITH EAG   2. Obesity, Class III, BMI 40-49.9 (morbid obesity) (HCC) E66.01 278.01    3. Pure hypercholesterolemia E78.00 272.0 LIPID PANEL      METABOLIC PANEL, COMPREHENSIVE   4. Essential hypertension I10 401.9 LIPID PANEL      METABOLIC PANEL, COMPREHENSIVE     Orders Placed This Encounter    LIPID PANEL    METABOLIC PANEL, COMPREHENSIVE    HEMOGLOBIN A1C WITH EAG    AMB POC HEMOGLOBIN A1C      needs to follow diet more regularly  Issues reviewed with him: low cholesterol diet, weight control and daily exercise discussed. I have discussed the diagnosis with the patient and the intended plan as seen in the above orders. The patient has received an after-visit summary and questions were answered concerning future plans. I have discussed medication side effects and warnings with the patient as well. Patient agreeable with above plan and verbalizes understanding. Follow-up and Dispositions    · Return in about 4 months (around 6/7/2020) for HTN/DM/HLD.

## 2020-02-12 RX ORDER — FENOFIBRATE 145 MG/1
TABLET, COATED ORAL
Qty: 90 TAB | Refills: 0 | Status: SHIPPED | OUTPATIENT
Start: 2020-02-12 | End: 2020-04-11

## 2020-02-17 DIAGNOSIS — E11.65 TYPE 2 DIABETES MELLITUS WITH HYPERGLYCEMIA, WITHOUT LONG-TERM CURRENT USE OF INSULIN (HCC): ICD-10-CM

## 2020-02-17 RX ORDER — PEN NEEDLE, DIABETIC 30 GX3/16"
NEEDLE, DISPOSABLE MISCELLANEOUS
Qty: 100 PEN NEEDLE | Refills: 11 | Status: SHIPPED | OUTPATIENT
Start: 2020-02-17 | End: 2021-03-25

## 2020-02-17 NOTE — TELEPHONE ENCOUNTER
Last Visit: 2/7/20 with NP Deshawn Hogan  Next Appointment: return in 4 months  Previous Refill Encounter(s): 9/12/18 #90 with 11 refill    Requested Prescriptions     Pending Prescriptions Disp Refills    Insulin Needles, Disposable, 31 gauge x 5/16\" ndle 100 Pen Needle 11     Sig: Pen needled - To use with lantus solostar  Dx E11.65

## 2020-04-06 RX ORDER — METFORMIN HYDROCHLORIDE 500 MG/1
TABLET, EXTENDED RELEASE ORAL
Qty: 180 TAB | Refills: 0 | Status: SHIPPED | OUTPATIENT
Start: 2020-04-06 | End: 2020-05-28

## 2020-04-11 RX ORDER — FENOFIBRATE 145 MG/1
TABLET, COATED ORAL
Qty: 90 TAB | Refills: 0 | Status: SHIPPED | OUTPATIENT
Start: 2020-04-11 | End: 2020-07-02

## 2020-05-28 RX ORDER — METFORMIN HYDROCHLORIDE 500 MG/1
TABLET, EXTENDED RELEASE ORAL
Qty: 180 TAB | Refills: 0 | Status: SHIPPED | OUTPATIENT
Start: 2020-05-28 | End: 2021-01-21

## 2020-06-07 DIAGNOSIS — E78.00 PURE HYPERCHOLESTEROLEMIA: ICD-10-CM

## 2020-06-07 DIAGNOSIS — I10 ESSENTIAL HYPERTENSION: ICD-10-CM

## 2020-06-07 DIAGNOSIS — E11.65 TYPE 2 DIABETES MELLITUS WITH HYPERGLYCEMIA, WITH LONG-TERM CURRENT USE OF INSULIN (HCC): ICD-10-CM

## 2020-06-07 DIAGNOSIS — Z79.4 TYPE 2 DIABETES MELLITUS WITH HYPERGLYCEMIA, WITH LONG-TERM CURRENT USE OF INSULIN (HCC): ICD-10-CM

## 2020-07-02 RX ORDER — FENOFIBRATE 145 MG/1
TABLET, COATED ORAL
Qty: 90 TAB | Refills: 0 | Status: SHIPPED | OUTPATIENT
Start: 2020-07-02 | End: 2020-09-29

## 2020-09-01 RX ORDER — METFORMIN HYDROCHLORIDE 500 MG/1
TABLET, EXTENDED RELEASE ORAL
Qty: 180 TAB | Refills: 3 | OUTPATIENT
Start: 2020-09-01

## 2020-09-01 RX ORDER — METFORMIN HYDROCHLORIDE 500 MG/1
500 TABLET ORAL 2 TIMES DAILY WITH MEALS
Qty: 180 TAB | Refills: 1 | Status: SHIPPED | OUTPATIENT
Start: 2020-09-01 | End: 2020-12-23

## 2020-09-22 DIAGNOSIS — E11.65 TYPE 2 DIABETES MELLITUS WITH HYPERGLYCEMIA, WITH LONG-TERM CURRENT USE OF INSULIN (HCC): ICD-10-CM

## 2020-09-22 DIAGNOSIS — Z79.4 TYPE 2 DIABETES MELLITUS WITH HYPERGLYCEMIA, WITH LONG-TERM CURRENT USE OF INSULIN (HCC): ICD-10-CM

## 2021-01-21 ENCOUNTER — VIRTUAL VISIT (OUTPATIENT)
Dept: FAMILY MEDICINE CLINIC | Age: 36
End: 2021-01-21
Payer: COMMERCIAL

## 2021-01-21 DIAGNOSIS — E78.00 PURE HYPERCHOLESTEROLEMIA: ICD-10-CM

## 2021-01-21 DIAGNOSIS — I10 ESSENTIAL HYPERTENSION: ICD-10-CM

## 2021-01-21 DIAGNOSIS — K40.90 RIGHT INGUINAL HERNIA: Primary | ICD-10-CM

## 2021-01-21 DIAGNOSIS — E11.65 TYPE 2 DIABETES MELLITUS WITH HYPERGLYCEMIA, WITH LONG-TERM CURRENT USE OF INSULIN (HCC): ICD-10-CM

## 2021-01-21 DIAGNOSIS — Z79.4 TYPE 2 DIABETES MELLITUS WITH HYPERGLYCEMIA, WITH LONG-TERM CURRENT USE OF INSULIN (HCC): ICD-10-CM

## 2021-01-21 PROCEDURE — 99213 OFFICE O/P EST LOW 20 MIN: CPT | Performed by: NURSE PRACTITIONER

## 2021-01-21 NOTE — PROGRESS NOTES
Joseluis Baca is a 38 y.o. male who was seen by synchronous (real-time) audio-video technology on 1/21/2021 for Inguinal Hernia (right)      Assessment & Plan:   Diagnoses and all orders for this visit:    1. Right inguinal hernia  -     US SCROTUM/TESTICLES; Future  -     REFERRAL TO GENERAL SURGERY    2. Type 2 diabetes mellitus with hyperglycemia, with long-term current use of insulin (HCC)  -     HEMOGLOBIN A1C WITH EAG; Future  -     LIPID PANEL; Future  -     METABOLIC PANEL, COMPREHENSIVE; Future  -     MICROALBUMIN, UR, RAND W/ MICROALB/CREAT RATIO; Future    3. Pure hypercholesterolemia  -     LIPID PANEL; Future  -     METABOLIC PANEL, COMPREHENSIVE; Future    4. Essential hypertension  -     LIPID PANEL; Future  -     METABOLIC PANEL, COMPREHENSIVE; Future  -     MICROALBUMIN, UR, RAND W/ MICROALB/CREAT RATIO; Future      Follow-up and Dispositions    · Return for DM/HTN/HLD, in office follow up. I spent at least 15 minutes on this visit with this established patient. 712  Subjective:   States he has had increased right groin pain. Comments he was dx with a hernia a while ago however, hernia has slighty increased in size. States for the last couple of months he has had pain daily. Comments at times pain will last for several seconds to several minutes. Further states initially hernia would present with heavy lifting now it will pop out on it's own. Hernia is reducible. Prior to Admission medications    Medication Sig Start Date End Date Taking?  Authorizing Provider   amLODIPine-benazepril (LOTREL) 10-40 mg per capsule TAKE 1 CAPSULE BY MOUTH  DAILY 12/23/20   Artis SKINNER NP   fenofibrate nanocrystallized (TRICOR) 145 mg tablet TAKE 1 TABLET BY MOUTH  DAILY 12/23/20   Artis SKINNER NP   metFORMIN (GLUCOPHAGE) 500 mg tablet TAKE 1 TABLET BY MOUTH  TWICE DAILY 12/23/20   Stanley Gilbert NP   OneTouch Ultra Blue Test Strip strip TEST TWO TIMES DAILY 9/23/20   Stanley Gilbert NP Lantus Solostar U-100 Insulin 100 unit/mL (3 mL) inpn INJECT SUBCUTANEOUSLY 20  UNITS NIGHTLY 7/8/20   Joe SKINNER NP   metFORMIN ER (GLUCOPHAGE XR) 500 mg tablet TAKE 2 TABLETS BY MOUTH  DAILY WITH DINNER 5/28/20   Joe SKINNER NP   Insulin Needles, Disposable, 31 gauge x 5/16\" ndle Pen needled - To use with lantus solostar  Dx E11.65 2/17/20   Joe SKINNER NP   omeprazole (PRILOSEC) 20 mg capsule Take 1 Cap by mouth Daily (before breakfast). 3/11/19   Joe SKINNER NP   Blood-Glucose Meter monitoring kit One touch mini 9/10/14   John Paul Marti NP     Patient Active Problem List   Diagnosis Code    Motion sickness T75. 3XXA    Kidney stone N20.0    Diabetes mellitus, type 2 ([address] E11.9    Cellulitis, scrotum N49.2    Sepsis ([address] A41.9    Cellulitis, perineum L03.315    Hyperglycemia due to type 2 diabetes mellitus ([address] E11.65    Hypokalemia E87.6    Obesity, morbid ([facility]) E66.01    Type 2 diabetes with nephropathy ([address] E11.21     Patient Active Problem List    Diagnosis Date Noted    Type 2 diabetes with nephropathy ([address] 07/16/2018    Obesity, morbid ([address] 07/09/2018    Hypokalemia 06/18/2018    Cellulitis, scrotum 06/14/2018    Sepsis ([address] 06/14/2018    Cellulitis, perineum 06/14/2018    Hyperglycemia due to type 2 diabetes mellitus ([address] 06/14/2018    Diabetes mellitus, type 2 ([address] 09/25/2014    Kidney stone 09/26/2012    Motion sickness 09/25/2012     Current Outpatient Medications   Medication Sig Dispense Refill    amLODIPine-benazepril (LOTREL) 10-40 mg per capsule TAKE 1 CAPSULE BY MOUTH  DAILY 90 Cap 0    fenofibrate nanocrystallized (TRICOR) 145 mg tablet TAKE 1 TABLET BY MOUTH  DAILY 90 Tab 0    metFORMIN (GLUCOPHAGE) 500 mg tablet TAKE 1 TABLET BY MOUTH  TWICE DAILY 180 Tab 0    OneTouch Ultra Blue Test Strip strip TEST TWO TIMES DAILY 200 Strip 3    Lantus Solostar U-100 Insulin 100 unit/mL (3 mL) inpn INJECT SUBCUTANEOUSLY 20  UNITS NIGHTLY 30 mL 1    Insulin Needles, Disposable, 31 gauge x 5/16\" ndle Pen needled - To use with lantus solostar  Dx E11.65 100 Pen Needle 11    omeprazole (PRILOSEC) 20 mg capsule Take 1 Cap by mouth Daily (before breakfast). 30 Cap 1    Blood-Glucose Meter monitoring kit One touch mini 1 kit 0     Allergies   Allergen Reactions    Tylenol Cold [Skf-Xcahooqqq-My-Acetaminophen] Swelling     Past Medical History:   Diagnosis Date    Diabetes mellitus, type 2 ([address] 9/25/2014    Kidney stone 9/26/2012    Kidney stones      Past Surgical History:   Procedure Laterality Date    HX KNEE ARTHROSCOPY       Family History   Problem Relation Age of Onset    Cancer Father     Hypertension Father     Diabetes Father     Cancer Paternal Uncle     Hypertension Mother      Social History     Tobacco Use    Smoking status: Never Smoker    Smokeless tobacco: Never Used   Substance Use Topics    Alcohol use: Yes     Review of Systems   Genitourinary:        Right inguinal hernia     Objective:   No flowsheet data found. General: alert, cooperative, no distress   Mental  status: normal mood, behavior, speech, dress, motor activity, and thought processes, able to follow commands   HENT: NCAT   Neck: no visualized mass   Resp: no respiratory distress   Neuro: no gross deficits   Skin: no discoloration or lesions of concern on visible areas   Psychiatric: normal affect, consistent with stated mood, no evidence of hallucinations     Additional exam findings: We discussed the expected course, resolution and complications of the diagnosis(es) in detail. Medication risks, benefits, costs, interactions, and alternatives were discussed as indicated. I advised him to contact the office if his condition worsens, changes or fails to improve as anticipated. He expressed understanding with the diagnosis(es) and plan.        Charity Palacios, who was evaluated through a patient-initiated, synchronous (real-time) audio-video encounter, and/or his healthcare decision maker, is aware that it is a billable service, with coverage as determined by his insurance carrier. He provided verbal consent to proceed: Yes, and patient identification was verified. It was conducted pursuant to the emergency declaration under the [address] [place] [address], [address] [place] [address] authority and the [employer] Act. A caregiver was present when appropriate. Ability to conduct physical exam was limited. I was at home. The patient was at home.       Jessenia De León NP

## 2021-01-27 ENCOUNTER — APPOINTMENT (OUTPATIENT)
Dept: FAMILY MEDICINE CLINIC | Age: 36
End: 2021-01-27

## 2021-01-27 DIAGNOSIS — E11.65 TYPE 2 DIABETES MELLITUS WITH HYPERGLYCEMIA, WITH LONG-TERM CURRENT USE OF INSULIN (HCC): ICD-10-CM

## 2021-01-27 DIAGNOSIS — E78.00 PURE HYPERCHOLESTEROLEMIA: ICD-10-CM

## 2021-01-27 DIAGNOSIS — I10 ESSENTIAL HYPERTENSION: ICD-10-CM

## 2021-01-27 DIAGNOSIS — Z79.4 TYPE 2 DIABETES MELLITUS WITH HYPERGLYCEMIA, WITH LONG-TERM CURRENT USE OF INSULIN (HCC): ICD-10-CM

## 2021-01-28 LAB
A-G RATIO,AGRAT: 2.1 RATIO (ref 1.1–2.6)
ALBUMIN SERPL-MCNC: 4.7 G/DL (ref 3.5–5)
ALP SERPL-CCNC: 78 U/L (ref 25–115)
ALT SERPL-CCNC: 26 U/L (ref 5–40)
ANION GAP SERPL CALC-SCNC: 9 MMOL/L (ref 3–15)
AST SERPL W P-5'-P-CCNC: 16 U/L (ref 10–37)
AVG GLU, 10930: 225 MG/DL (ref 91–123)
BILIRUB SERPL-MCNC: 0.9 MG/DL (ref 0.2–1.2)
BUN SERPL-MCNC: 13 MG/DL (ref 6–22)
CALCIUM SERPL-MCNC: 10 MG/DL (ref 8.4–10.5)
CHLORIDE SERPL-SCNC: 102 MMOL/L (ref 98–110)
CHOLEST SERPL-MCNC: 200 MG/DL (ref 110–200)
CO2 SERPL-SCNC: 29 MMOL/L (ref 20–32)
CREAT SERPL-MCNC: 0.9 MG/DL (ref 0.5–1.2)
CREATININE, URINE: 113 MG/DL
GFRAA, 66117: >60
GFRNA, 66118: >60
GLOBULIN,GLOB: 2.2 G/DL (ref 2–4)
GLUCOSE SERPL-MCNC: 169 MG/DL (ref 70–99)
HBA1C MFR BLD HPLC: 9.5 % (ref 4.8–5.6)
HDLC SERPL-MCNC: 36 MG/DL
HDLC SERPL-MCNC: 5.6 MG/DL (ref 0–5)
LDL/HDL RATIO,LDHD: 3.8
LDLC SERPL CALC-MCNC: 136 MG/DL (ref 50–99)
MICROALB/CREAT RATIO, 140286: 53.1 (ref 0–30)
MICROALBUMIN,URINE RANDOM 140054: 60 MG/L (ref 0.1–17)
NON-HDL CHOLESTEROL, 011976: 164 MG/DL
POTASSIUM SERPL-SCNC: 4.5 MMOL/L (ref 3.5–5.5)
PROT SERPL-MCNC: 6.9 G/DL (ref 6.4–8.3)
SODIUM SERPL-SCNC: 140 MMOL/L (ref 133–145)
TRIGL SERPL-MCNC: 141 MG/DL (ref 40–149)
VLDLC SERPL CALC-MCNC: 28 MG/DL (ref 8–30)

## 2021-02-04 ENCOUNTER — HOSPITAL ENCOUNTER (OUTPATIENT)
Dept: ULTRASOUND IMAGING | Age: 36
Discharge: HOME | End: 2021-02-04
Attending: NURSE PRACTITIONER
Payer: COMMERCIAL

## 2021-02-04 DIAGNOSIS — K40.90 RIGHT INGUINAL HERNIA: ICD-10-CM

## 2021-02-04 PROCEDURE — 93975 VASCULAR STUDY: CPT

## 2021-02-16 ENCOUNTER — OFFICE VISIT (OUTPATIENT)
Dept: SURGERY | Age: 36
End: 2021-02-16
Payer: COMMERCIAL

## 2021-02-16 VITALS
OXYGEN SATURATION: 98 % | HEART RATE: 92 BPM | SYSTOLIC BLOOD PRESSURE: 212 MMHG | TEMPERATURE: 97.7 F | BODY MASS INDEX: 44.1 KG/M2 | WEIGHT: 315 LBS | HEIGHT: 71 IN | RESPIRATION RATE: 18 BRPM | DIASTOLIC BLOOD PRESSURE: 134 MMHG

## 2021-02-16 DIAGNOSIS — K40.90 RIGHT INGUINAL HERNIA: Primary | ICD-10-CM

## 2021-02-16 DIAGNOSIS — Z01.818 PRE-OP TESTING: ICD-10-CM

## 2021-02-16 PROCEDURE — 99205 OFFICE O/P NEW HI 60 MIN: CPT | Performed by: SURGERY

## 2021-02-16 RX ORDER — SODIUM CHLORIDE 0.9 % (FLUSH) 0.9 %
5-40 SYRINGE (ML) INJECTION AS NEEDED
Status: CANCELLED | OUTPATIENT
Start: 2021-02-16

## 2021-02-16 RX ORDER — SODIUM CHLORIDE 0.9 % (FLUSH) 0.9 %
5-40 SYRINGE (ML) INJECTION EVERY 8 HOURS
Status: CANCELLED | OUTPATIENT
Start: 2021-02-16

## 2021-02-16 RX ORDER — GUAIFENESIN 100 MG/5ML
81 LIQUID (ML) ORAL DAILY
COMMUNITY

## 2021-02-16 NOTE — H&P (VIEW-ONLY)
[facility] General Surgery Subjective: HPI: Patient is a very pleasant 79-year-old male with a past medical history remarkable for morbid obesity with BMI 46.86 kg per metered square, hypertension, diabetes mellitus type 2, nephrolithiasis and history of sepsis with scrotal cellulitis in 2018. The patient is referred by his primary provider Talib Jones nurse practitioner for evaluation and management of a right inguinal hernia. He states that the hernia has been present for several months. It hurts when he lifts things sits in his car or truck. He works as a . He really does not have any light duty at his job. He denies any dysuria or diarrhea constipation. Patient Active Problem List  
 Diagnosis Date Noted  Type 2 diabetes with nephropathy ([address] 07/16/2018  Obesity, morbid ([address] 07/09/2018  Hypokalemia 06/18/2018  Cellulitis, scrotum 06/14/2018  Sepsis ([address] 06/14/2018  Cellulitis, perineum 06/14/2018  Hyperglycemia due to type 2 diabetes mellitus ([address] 06/14/2018  Diabetes mellitus, type 2 ([address] 09/25/2014  Kidney stone 09/26/2012  Motion sickness 09/25/2012 Past Medical History:  
Diagnosis Date  Diabetes mellitus, type 2 ([address] 9/25/2014  Kidney stone 9/26/2012  Kidney stones Past Surgical History:  
Procedure Laterality Date  HX KNEE ARTHROSCOPY Family History Problem Relation Age of Onset  Cancer Father  Hypertension Father  Diabetes Father  Cancer Paternal Uncle  Hypertension Mother Social History Tobacco Use  Smoking status: Never Smoker  Smokeless tobacco: Never Used Substance Use Topics  Alcohol use: Yes Allergies Allergen Reactions  Tylenol Cold [Rfj-Tvpkanqfk-Nf-Acetaminophen] Swelling Prior to Admission medications Medication Sig Start Date End Date Taking? Authorizing Provider aspirin 81 mg chewable tablet Take 81 mg by mouth daily. Yes Provider, Historical  
amLODIPine-benazepril (LOTREL) 10-40 mg per capsule TAKE 1 CAPSULE BY MOUTH  DAILY 12/23/20  Yes Yo Dominique NP  
fenofibrate nanocrystallized (TRICOR) 145 mg tablet TAKE 1 TABLET BY MOUTH  DAILY 12/23/20  Yes Yo Dominique NP  
metFORMIN (GLUCOPHAGE) 500 mg tablet TAKE 1 TABLET BY MOUTH  TWICE DAILY 12/23/20  Yes Yo Dominique NP OneTouch Ultra Blue Test Strip strip TEST TWO TIMES DAILY 9/23/20  Yes Yo Dominique NP Lantus Solostar U-100 Insulin 100 unit/mL (3 mL) inpn INJECT SUBCUTANEOUSLY 20  UNITS NIGHTLY 7/8/20  Yes Yo Dominique NP Insulin Needles, Disposable, 31 gauge x 5/16\" ndle Pen needled - To use with lantus solostar  Dx E11.65 2/17/20  Yes Yo Dominique NP  
omeprazole (PRILOSEC) 20 mg capsule Take 1 Cap by mouth Daily (before breakfast). 3/11/19  Yes Yo Dominique NP Blood-Glucose Meter monitoring kit One touch mini 9/10/14  Yes Yo Dominique NP Review of Systems:   
14 systems were reviewed. The results are as above in the HPI and otherwise negative. Objective:  
 
Vitals:  
 02/16/21 1102 02/16/21 1107 BP: (!) 138/112 (!) 212/134 Pulse:  92 Resp: 18 Temp: 97.7 °F (36.5 °C) SpO2: 98% Weight: 152.4 kg (336 lb) Height: 5' 11\" (1.803 m) Physical Exam: 
GENERAL: alert, cooperative, no distress, appears stated age, EYE: conjunctivae/corneas clear. PERRL, EOM's intact. THROAT & NECK: normal and no erythema or exudates noted. ,   
LYMPHATIC: Cervical, supraclavicular, and axillary nodes normal. ,  
LUNG: clear to auscultation bilaterally, HEART: regular rate and rhythm, S1, S2 normal, no murmur, click, rub or gallop, ABDOMEN: soft, non-tender. Bowel sounds normal. No masses,  no organomegaly. Both testes are descended into the scrotum. No hernia palpable on the left. Reducible hernia palpable on the right. EXTREMITIES:  extremities normal, atraumatic, no cyanosis or edema, SKIN: Normal., NEUROLOGIC: AOx3. Cranial nerves 2-12 and sensation grossly intact. ,  
 
Data Review: To be done Mr. Yahaira Recinos has a reminder for a \"due or due soon\" health maintenance. I have asked that he contact his primary care provider for follow-up on this health maintenance. Impression: · Patient with reducible but symptomatic right inguinal hernia. Plan:  
 
· Robot assisted laparoscopic right inguinal hernia repair with mesh · Consent on chart · Preoperative orders written Signed By: Brett Shaffer MD   
 February 16, 2021

## 2021-02-16 NOTE — PATIENT INSTRUCTIONS
Inguinal Hernia Repair: Before Your Surgery What is inguinal hernia repair? Inguinal hernia repair is a type of surgery. An inguinal hernia is a bulge under the skin in your groin. It happens when there is a weak spot in the groin muscle and a piece of the intestines or tissue pokes through the muscle. This can be painful. You may have pain when you're active. Or it may be painful when you strain during a bowel movement or lift something heavy. Surgery can help with your pain. It can also prevent serious problems that can happen if an organ or tissue gets stuck in the hernia. There are two ways to do this surgery. In open surgery, the doctor makes one cut near the hernia. This cut is called an incision. In laparoscopic surgery, the doctor makes several very small incisions and uses a thin, lighted scope and small tools. During surgery, the doctor pushes the bulge back in place. The doctor may place a piece of mesh on top of the bulge to help keep it in place. Then the healthy tissue is sewn back together. Laparoscopic surgery leaves several small scars. Open surgery leaves one long scar. The scars fade with time. After the surgery, you can probably return to light activity after 1 to 3 weeks. How long it takes will depend on the type of surgery. Follow-up care is a key part of your treatment and safety. Be sure to make and go to all appointments, and call your doctor if you are having problems. It's also a good idea to know your test results and keep a list of the medicines you take. How do you prepare for surgery? Surgery can be stressful. This information will help you understand what you can expect. And it will help you safely prepare for surgery. Preparing for surgery 
  · Be sure you have someone to take you home. Anesthesia and pain medicine will make it unsafe for you to drive or get home on your own.   · Understand exactly what surgery is planned, along with the risks, benefits, and other options.  
  · If you take aspirin or some other blood thinner, ask your doctor if you should stop taking it before your surgery. Make sure that you understand exactly what your doctor wants you to do. These medicines increase the risk of bleeding.  
  · Tell your doctor ALL the medicines, vitamins, supplements, and herbal remedies you take. Some may increase the risk of problems during your surgery. Your doctor will tell you if you should stop taking any of them before the surgery and how soon to do it.  
  · Make sure your doctor and the hospital have a copy of your advance directive. If you don't have one, you may want to prepare one. It lets others know your health care wishes. It's a good thing to have before any type of surgery or procedure. What happens on the day of surgery? · Follow the instructions exactly about when to stop eating and drinking. If you don't, your surgery may be canceled. If your doctor told you to take your medicines on the day of surgery, take them with only a sip of water.  
  · Take a bath or shower before you come in for your surgery. Do not apply lotions, perfumes, deodorants, or nail polish.  
  · Do not shave the surgical site yourself.  
  · Take off all jewelry and piercings. And take out contact lenses, if you wear them. At the hospital or surgery center · Bring a picture ID.  
  · The area for surgery is often marked to make sure there are no errors.  
  · You will be kept comfortable and safe by your anesthesia provider. The anesthesia may make you sleep. Or it may just numb the area being worked on.  
  · The surgery will take about 1 to 2 hours. When should you call your doctor? · You have questions or concerns.  
  · You don't understand how to prepare for your surgery.  
  · You become ill before the surgery (such as fever, flu, or a cold).   · You need to reschedule or have changed your mind about having the surgery. Where can you learn more? Go to http://www.gray.com/ Enter [id number] in the search box to learn more about \"Inguinal Hernia Repair: Before Your Surgery. \" Current as of: April 15, 2020               Content Version: 12.6 © 2361-7921 [employer], [employer]. Care instructions adapted under license by [employer] (which disclaims liability or warranty for this information). If you have questions about a medical condition or this instruction, always ask your healthcare professional. [address] any warranty or liability for your use of this information.

## 2021-02-24 ENCOUNTER — HOSPITAL ENCOUNTER (OUTPATIENT)
Dept: PREADMISSION TESTING | Age: 36
Discharge: HOME | End: 2021-02-24
Payer: COMMERCIAL

## 2021-02-24 ENCOUNTER — HOSPITAL ENCOUNTER (OUTPATIENT)
Dept: GENERAL RADIOLOGY | Age: 36
Discharge: HOME | End: 2021-02-24
Payer: COMMERCIAL

## 2021-02-24 ENCOUNTER — HOSPITAL ENCOUNTER (OUTPATIENT)
Dept: LAB | Age: 36
Discharge: HOME | End: 2021-02-24

## 2021-02-24 DIAGNOSIS — K40.90 RIGHT INGUINAL HERNIA: ICD-10-CM

## 2021-02-24 DIAGNOSIS — Z01.818 PRE-OP TESTING: ICD-10-CM

## 2021-02-24 LAB
ABSOLUTE LYMPHOCYTE COUNT, 10803: 2.5 K/UL (ref 1–4.8)
ANION GAP SERPL CALC-SCNC: 12.5 MMOL/L (ref 3–15)
ATRIAL RATE: 80 BPM
BASOPHILS # BLD: 0 K/UL (ref 0–0.2)
BASOPHILS NFR BLD: 0 % (ref 0–2)
BUN SERPL-MCNC: 14 MG/DL (ref 6–22)
CALCIUM SERPL-MCNC: 9.7 MG/DL (ref 8.4–10.5)
CALCULATED P AXIS, ECG09: 43 DEGREES
CALCULATED R AXIS, ECG10: 4 DEGREES
CALCULATED T AXIS, ECG11: 29 DEGREES
CHLORIDE SERPL-SCNC: 99 MMOL/L (ref 98–110)
CO2 SERPL-SCNC: 29 MMOL/L (ref 20–32)
CREAT SERPL-MCNC: 0.9 MG/DL (ref 0.5–1.2)
DIAGNOSIS, 93000: NORMAL
EOSINOPHIL # BLD: 0.2 K/UL (ref 0–0.5)
EOSINOPHIL NFR BLD: 2 % (ref 0–6)
ERYTHROCYTE [DISTWIDTH] IN BLOOD BY AUTOMATED COUNT: 12.7 % (ref 10–15.5)
GFRAA, 66117: >60
GFRNA, 66118: >60
GLUCOSE SERPL-MCNC: 230 MG/DL (ref 70–99)
GRANULOCYTES,GRANS: 69 % (ref 40–75)
HCT VFR BLD AUTO: 45.8 % (ref 36.6–51.9)
HGB BLD-MCNC: 15.7 G/DL (ref 13.2–17.3)
LYMPHOCYTES, LYMLT: 24 % (ref 20–45)
MCH RBC QN AUTO: 29 PG (ref 26–34)
MCHC RBC AUTO-ENTMCNC: 34 G/DL (ref 31–36)
MCV RBC AUTO: 85 FL (ref 80–95)
MONOCYTES # BLD: 0.5 K/UL (ref 0.1–1)
MONOCYTES NFR BLD: 5 % (ref 3–12)
NEUTROPHILS # BLD AUTO: 7.3 K/UL (ref 1.8–7.7)
P-R INTERVAL, ECG05: 142 MS
PLATELET # BLD AUTO: 367 K/UL (ref 140–440)
PMV BLD AUTO: 10.7 FL (ref 9–13)
POTASSIUM SERPL-SCNC: 4.4 MMOL/L (ref 3.5–5.5)
Q-T INTERVAL, ECG07: 370 MS
QRS DURATION, ECG06: 84 MS
QTC CALCULATION (BEZET), ECG08: 426 MS
RBC # BLD AUTO: 5.39 M/UL (ref 3.8–5.8)
SENTARA SPECIMEN COL,SENBCF: NORMAL
SODIUM SERPL-SCNC: 140 MMOL/L (ref 133–145)
VENTRICULAR RATE, ECG03: 80 BPM
WBC # BLD AUTO: 10.5 K/UL (ref 4–11)

## 2021-02-24 PROCEDURE — 99001 SPECIMEN HANDLING PT-LAB: CPT

## 2021-02-24 PROCEDURE — 71046 X-RAY EXAM CHEST 2 VIEWS: CPT

## 2021-02-24 PROCEDURE — U0003 INFECTIOUS AGENT DETECTION BY NUCLEIC ACID (DNA OR RNA); SEVERE ACUTE RESPIRATORY SYNDROME CORONAVIRUS 2 (SARS-COV-2) (CORONAVIRUS DISEASE [COVID-19]), AMPLIFIED PROBE TECHNIQUE, MAKING USE OF HIGH THROUGHPUT TECHNOLOGIES AS DESCRIBED BY CMS-2020-01-R: HCPCS

## 2021-02-24 PROCEDURE — 93005 ELECTROCARDIOGRAM TRACING: CPT

## 2021-02-25 LAB — SARS-COV-2, COV2NT: NOT DETECTED

## 2021-02-26 ENCOUNTER — ANESTHESIA EVENT (OUTPATIENT)
Dept: SURGERY | Age: 36
End: 2021-02-26
Payer: COMMERCIAL

## 2021-03-01 ENCOUNTER — HOSPITAL ENCOUNTER (OUTPATIENT)
Age: 36
Discharge: HOME | End: 2021-03-01
Attending: SURGERY | Admitting: SURGERY
Payer: COMMERCIAL

## 2021-03-01 ENCOUNTER — ANESTHESIA (OUTPATIENT)
Dept: SURGERY | Age: 36
End: 2021-03-01
Payer: COMMERCIAL

## 2021-03-01 VITALS
DIASTOLIC BLOOD PRESSURE: 57 MMHG | HEART RATE: 60 BPM | BODY MASS INDEX: 44.1 KG/M2 | RESPIRATION RATE: 20 BRPM | SYSTOLIC BLOOD PRESSURE: 103 MMHG | HEIGHT: 71 IN | TEMPERATURE: 97.7 F | WEIGHT: 315 LBS | OXYGEN SATURATION: 95 %

## 2021-03-01 DIAGNOSIS — G89.18 POSTOPERATIVE PAIN: Primary | ICD-10-CM

## 2021-03-01 PROBLEM — K40.90 RIGHT INGUINAL HERNIA: Status: ACTIVE | Noted: 2021-03-01

## 2021-03-01 LAB
GLUCOSE BLD STRIP.AUTO-MCNC: 170 MG/DL (ref 70–110)
GLUCOSE BLD STRIP.AUTO-MCNC: 216 MG/DL (ref 70–110)

## 2021-03-01 PROCEDURE — 76010000876 HC OR TIME 2 TO 2.5HR INTENSV - TIER 2: Performed by: SURGERY

## 2021-03-01 PROCEDURE — 74011250637 HC RX REV CODE- 250/637: Performed by: ANESTHESIOLOGY

## 2021-03-01 PROCEDURE — 74011250636 HC RX REV CODE- 250/636: Performed by: ANESTHESIOLOGY

## 2021-03-01 PROCEDURE — 77030040922 HC BLNKT HYPOTHRM STRY -A: Performed by: SURGERY

## 2021-03-01 PROCEDURE — 77030010939 HC CLP LIG TELE -B: Performed by: SURGERY

## 2021-03-01 PROCEDURE — 77030002933 HC SUT MCRYL J&J -A: Performed by: SURGERY

## 2021-03-01 PROCEDURE — 88302 TISSUE EXAM BY PATHOLOGIST: CPT

## 2021-03-01 PROCEDURE — 77030040361 HC SLV COMPR DVT MDII -B: Performed by: SURGERY

## 2021-03-01 PROCEDURE — 77030035277 HC OBTRTR BLDELSS DISP INTU -B: Performed by: SURGERY

## 2021-03-01 PROCEDURE — 77030022704 HC SUT VLOC COVD -B: Performed by: SURGERY

## 2021-03-01 PROCEDURE — 77030020703 HC SEAL CANN DISP INTU -B: Performed by: SURGERY

## 2021-03-01 PROCEDURE — 74011000250 HC RX REV CODE- 250: Performed by: SURGERY

## 2021-03-01 PROCEDURE — 74011250636 HC RX REV CODE- 250/636: Performed by: NURSE ANESTHETIST, CERTIFIED REGISTERED

## 2021-03-01 PROCEDURE — 49650 LAP ING HERNIA REPAIR INIT: CPT | Performed by: SURGERY

## 2021-03-01 PROCEDURE — 00840 ANES IPER PX LOWER ABD NOS: CPT | Performed by: ANESTHESIOLOGY

## 2021-03-01 PROCEDURE — 74011000250 HC RX REV CODE- 250: Performed by: ANESTHESIOLOGY

## 2021-03-01 PROCEDURE — 2709999900 HC NON-CHARGEABLE SUPPLY: Performed by: SURGERY

## 2021-03-01 PROCEDURE — 77030031139 HC SUT VCRL2 J&J -A: Performed by: SURGERY

## 2021-03-01 PROCEDURE — 76210000021 HC REC RM PH II 0.5 TO 1 HR: Performed by: SURGERY

## 2021-03-01 PROCEDURE — 77030018673: Performed by: SURGERY

## 2021-03-01 PROCEDURE — 77030019605: Performed by: SURGERY

## 2021-03-01 PROCEDURE — S2900 ROBOTIC SURGICAL SYSTEM: HCPCS | Performed by: SURGERY

## 2021-03-01 PROCEDURE — 74011636637 HC RX REV CODE- 636/637: Performed by: ANESTHESIOLOGY

## 2021-03-01 PROCEDURE — 82962 GLUCOSE BLOOD TEST: CPT

## 2021-03-01 PROCEDURE — 76060000035 HC ANESTHESIA 2 TO 2.5 HR: Performed by: SURGERY

## 2021-03-01 PROCEDURE — 74011636637 HC RX REV CODE- 636/637: Performed by: NURSE ANESTHETIST, CERTIFIED REGISTERED

## 2021-03-01 PROCEDURE — 77030040830 HC CATH URETH FOL MDII -A: Performed by: SURGERY

## 2021-03-01 PROCEDURE — 74011250637 HC RX REV CODE- 250/637: Performed by: SURGERY

## 2021-03-01 PROCEDURE — 76210000016 HC OR PH I REC 1 TO 1.5 HR: Performed by: SURGERY

## 2021-03-01 PROCEDURE — C1781 MESH (IMPLANTABLE): HCPCS | Performed by: SURGERY

## 2021-03-01 DEVICE — 3DMAX MESH, 8.5 CM X 13.7 CM (3.3" X 5.4"), RIGHT, MEDIUM
Type: IMPLANTABLE DEVICE | Site: INGUINAL | Status: FUNCTIONAL
Brand: 3DMAX

## 2021-03-01 RX ORDER — SODIUM CHLORIDE 0.9 % (FLUSH) 0.9 %
5-40 SYRINGE (ML) INJECTION AS NEEDED
Status: DISCONTINUED | OUTPATIENT
Start: 2021-03-01 | End: 2021-03-01 | Stop reason: HOSPADM

## 2021-03-01 RX ORDER — SODIUM CHLORIDE, SODIUM LACTATE, POTASSIUM CHLORIDE, CALCIUM CHLORIDE 600; 310; 30; 20 MG/100ML; MG/100ML; MG/100ML; MG/100ML
50 INJECTION, SOLUTION INTRAVENOUS CONTINUOUS
Status: DISCONTINUED | OUTPATIENT
Start: 2021-03-01 | End: 2021-03-01 | Stop reason: HOSPADM

## 2021-03-01 RX ORDER — SODIUM CHLORIDE, SODIUM LACTATE, POTASSIUM CHLORIDE, CALCIUM CHLORIDE 600; 310; 30; 20 MG/100ML; MG/100ML; MG/100ML; MG/100ML
INJECTION, SOLUTION INTRAVENOUS
Status: DISCONTINUED | OUTPATIENT
Start: 2021-03-01 | End: 2021-03-01 | Stop reason: HOSPADM

## 2021-03-01 RX ORDER — ACETAMINOPHEN 325 MG/1
650 TABLET ORAL EVERY 6 HOURS
Qty: 56 TAB | Refills: 1 | Status: SHIPPED | OUTPATIENT
Start: 2021-03-01 | End: 2021-03-01

## 2021-03-01 RX ORDER — BUPIVACAINE HYDROCHLORIDE AND EPINEPHRINE 2.5; 5 MG/ML; UG/ML
INJECTION, SOLUTION EPIDURAL; INFILTRATION; INTRACAUDAL; PERINEURAL AS NEEDED
Status: DISCONTINUED | OUTPATIENT
Start: 2021-03-01 | End: 2021-03-01 | Stop reason: HOSPADM

## 2021-03-01 RX ORDER — ONDANSETRON 2 MG/ML
INJECTION INTRAMUSCULAR; INTRAVENOUS AS NEEDED
Status: DISCONTINUED | OUTPATIENT
Start: 2021-03-01 | End: 2021-03-01 | Stop reason: HOSPADM

## 2021-03-01 RX ORDER — DOCUSATE SODIUM 100 MG/1
100 CAPSULE, LIQUID FILLED ORAL 2 TIMES DAILY
Qty: 60 CAP | Refills: 2 | Status: SHIPPED | OUTPATIENT
Start: 2021-03-01 | End: 2021-03-25 | Stop reason: ALTCHOICE

## 2021-03-01 RX ORDER — CEFAZOLIN SODIUM 1 G/3ML
INJECTION, POWDER, FOR SOLUTION INTRAMUSCULAR; INTRAVENOUS AS NEEDED
Status: DISCONTINUED | OUTPATIENT
Start: 2021-03-01 | End: 2021-03-01 | Stop reason: HOSPADM

## 2021-03-01 RX ORDER — HYDROMORPHONE HYDROCHLORIDE 2 MG/ML
1 INJECTION, SOLUTION INTRAMUSCULAR; INTRAVENOUS; SUBCUTANEOUS
Status: DISCONTINUED | OUTPATIENT
Start: 2021-03-01 | End: 2021-03-01 | Stop reason: HOSPADM

## 2021-03-01 RX ORDER — MAGNESIUM SULFATE 100 %
16 CRYSTALS MISCELLANEOUS AS NEEDED
Status: DISCONTINUED | OUTPATIENT
Start: 2021-03-01 | End: 2021-03-01 | Stop reason: HOSPADM

## 2021-03-01 RX ORDER — SODIUM CHLORIDE, SODIUM LACTATE, POTASSIUM CHLORIDE, CALCIUM CHLORIDE 600; 310; 30; 20 MG/100ML; MG/100ML; MG/100ML; MG/100ML
25 INJECTION, SOLUTION INTRAVENOUS CONTINUOUS
Status: DISCONTINUED | OUTPATIENT
Start: 2021-03-01 | End: 2021-03-01 | Stop reason: HOSPADM

## 2021-03-01 RX ORDER — IBUPROFEN 600 MG/1
600 TABLET ORAL EVERY 6 HOURS
Qty: 28 TAB | Refills: 0 | Status: SHIPPED | OUTPATIENT
Start: 2021-03-01 | End: 2021-03-05 | Stop reason: SDUPTHER

## 2021-03-01 RX ORDER — SODIUM CHLORIDE 0.9 % (FLUSH) 0.9 %
5-40 SYRINGE (ML) INJECTION EVERY 8 HOURS
Status: DISCONTINUED | OUTPATIENT
Start: 2021-03-01 | End: 2021-03-01 | Stop reason: HOSPADM

## 2021-03-01 RX ORDER — INSULIN LISPRO 100 [IU]/ML
INJECTION, SOLUTION INTRAVENOUS; SUBCUTANEOUS ONCE
Status: COMPLETED | OUTPATIENT
Start: 2021-03-01 | End: 2021-03-01

## 2021-03-01 RX ORDER — LIDOCAINE HYDROCHLORIDE 20 MG/ML
INJECTION, SOLUTION EPIDURAL; INFILTRATION; INTRACAUDAL; PERINEURAL AS NEEDED
Status: DISCONTINUED | OUTPATIENT
Start: 2021-03-01 | End: 2021-03-01 | Stop reason: HOSPADM

## 2021-03-01 RX ORDER — MIDAZOLAM HYDROCHLORIDE 1 MG/ML
INJECTION, SOLUTION INTRAMUSCULAR; INTRAVENOUS AS NEEDED
Status: DISCONTINUED | OUTPATIENT
Start: 2021-03-01 | End: 2021-03-01 | Stop reason: HOSPADM

## 2021-03-01 RX ORDER — FENTANYL CITRATE 50 UG/ML
INJECTION, SOLUTION INTRAMUSCULAR; INTRAVENOUS AS NEEDED
Status: DISCONTINUED | OUTPATIENT
Start: 2021-03-01 | End: 2021-03-01 | Stop reason: HOSPADM

## 2021-03-01 RX ORDER — FAMOTIDINE 20 MG/1
20 TABLET, FILM COATED ORAL ONCE
Status: COMPLETED | OUTPATIENT
Start: 2021-03-01 | End: 2021-03-01

## 2021-03-01 RX ORDER — GLYCOPYRROLATE 0.2 MG/ML
INJECTION INTRAMUSCULAR; INTRAVENOUS AS NEEDED
Status: DISCONTINUED | OUTPATIENT
Start: 2021-03-01 | End: 2021-03-01 | Stop reason: HOSPADM

## 2021-03-01 RX ORDER — DEXTROSE 50 % IN WATER (D50W) INTRAVENOUS SYRINGE
25-50 AS NEEDED
Status: DISCONTINUED | OUTPATIENT
Start: 2021-03-01 | End: 2021-03-01 | Stop reason: HOSPADM

## 2021-03-01 RX ORDER — OXYCODONE HYDROCHLORIDE 5 MG/1
5 TABLET ORAL
Status: COMPLETED | OUTPATIENT
Start: 2021-03-01 | End: 2021-03-01

## 2021-03-01 RX ORDER — PROPOFOL 10 MG/ML
INJECTION, EMULSION INTRAVENOUS AS NEEDED
Status: DISCONTINUED | OUTPATIENT
Start: 2021-03-01 | End: 2021-03-01 | Stop reason: HOSPADM

## 2021-03-01 RX ORDER — TAMSULOSIN HYDROCHLORIDE 0.4 MG/1
0.8 CAPSULE ORAL ONCE
Status: COMPLETED | OUTPATIENT
Start: 2021-03-01 | End: 2021-03-01

## 2021-03-01 RX ORDER — SUCCINYLCHOLINE CHLORIDE 20 MG/ML
INJECTION INTRAMUSCULAR; INTRAVENOUS AS NEEDED
Status: DISCONTINUED | OUTPATIENT
Start: 2021-03-01 | End: 2021-03-01 | Stop reason: HOSPADM

## 2021-03-01 RX ORDER — DEXAMETHASONE SODIUM PHOSPHATE 4 MG/ML
INJECTION, SOLUTION INTRA-ARTICULAR; INTRALESIONAL; INTRAMUSCULAR; INTRAVENOUS; SOFT TISSUE AS NEEDED
Status: DISCONTINUED | OUTPATIENT
Start: 2021-03-01 | End: 2021-03-01 | Stop reason: HOSPADM

## 2021-03-01 RX ORDER — ROCURONIUM BROMIDE 10 MG/ML
INJECTION, SOLUTION INTRAVENOUS AS NEEDED
Status: DISCONTINUED | OUTPATIENT
Start: 2021-03-01 | End: 2021-03-01 | Stop reason: HOSPADM

## 2021-03-01 RX ORDER — OXYCODONE HYDROCHLORIDE 5 MG/1
5 TABLET ORAL
Qty: 20 TAB | Refills: 0 | Status: SHIPPED | OUTPATIENT
Start: 2021-03-01 | End: 2021-03-04

## 2021-03-01 RX ORDER — MAGNESIUM SULFATE 100 %
4 CRYSTALS MISCELLANEOUS AS NEEDED
Status: DISCONTINUED | OUTPATIENT
Start: 2021-03-01 | End: 2021-03-01 | Stop reason: HOSPADM

## 2021-03-01 RX ORDER — ONDANSETRON 2 MG/ML
4 INJECTION INTRAMUSCULAR; INTRAVENOUS ONCE
Status: DISCONTINUED | OUTPATIENT
Start: 2021-03-01 | End: 2021-03-01 | Stop reason: HOSPADM

## 2021-03-01 RX ADMIN — INSULIN LISPRO 6 UNITS: 100 INJECTION, SOLUTION INTRAVENOUS; SUBCUTANEOUS at 11:00

## 2021-03-01 RX ADMIN — GLYCOPYRROLATE 0.2 MG: 0.2 INJECTION INTRAMUSCULAR; INTRAVENOUS at 07:45

## 2021-03-01 RX ADMIN — ROCURONIUM BROMIDE 50 MG: 50 INJECTION INTRAVENOUS at 08:20

## 2021-03-01 RX ADMIN — DEXAMETHASONE SODIUM PHOSPHATE 4 MG: 4 INJECTION, SOLUTION INTRAMUSCULAR; INTRAVENOUS at 07:45

## 2021-03-01 RX ADMIN — OXYCODONE HYDROCHLORIDE 5 MG: 5 TABLET ORAL at 11:32

## 2021-03-01 RX ADMIN — INSULIN LISPRO 3 UNITS: 100 INJECTION, SOLUTION INTRAVENOUS; SUBCUTANEOUS at 06:44

## 2021-03-01 RX ADMIN — CEFAZOLIN SODIUM 2 G: 1 INJECTION, POWDER, FOR SOLUTION INTRAMUSCULAR; INTRAVENOUS at 07:34

## 2021-03-01 RX ADMIN — MIDAZOLAM HYDROCHLORIDE 2 MG: 2 INJECTION, SOLUTION INTRAMUSCULAR; INTRAVENOUS at 07:45

## 2021-03-01 RX ADMIN — HYDROMORPHONE HYDROCHLORIDE 1 MG: 2 INJECTION, SOLUTION INTRAMUSCULAR; INTRAVENOUS; SUBCUTANEOUS at 09:45

## 2021-03-01 RX ADMIN — LIDOCAINE HYDROCHLORIDE 60 MG: 20 INJECTION, SOLUTION EPIDURAL; INFILTRATION; INTRACAUDAL; PERINEURAL at 07:45

## 2021-03-01 RX ADMIN — Medication 10 ML: at 06:20

## 2021-03-01 RX ADMIN — ROCURONIUM BROMIDE 50 MG: 50 INJECTION INTRAVENOUS at 07:45

## 2021-03-01 RX ADMIN — SUCCINYLCHOLINE CHLORIDE 160 MG: 20 INJECTION, SOLUTION INTRAMUSCULAR; INTRAVENOUS at 07:45

## 2021-03-01 RX ADMIN — ONDANSETRON 4 MG: 2 INJECTION INTRAMUSCULAR; INTRAVENOUS at 07:45

## 2021-03-01 RX ADMIN — PROPOFOL 200 MG: 10 INJECTION, EMULSION INTRAVENOUS at 07:45

## 2021-03-01 RX ADMIN — FENTANYL CITRATE 100 MCG: 50 INJECTION, SOLUTION INTRAMUSCULAR; INTRAVENOUS at 07:45

## 2021-03-01 RX ADMIN — SODIUM CHLORIDE, SODIUM LACTATE, POTASSIUM CHLORIDE, AND CALCIUM CHLORIDE 25 ML/HR: 600; 310; 30; 20 INJECTION, SOLUTION INTRAVENOUS at 06:20

## 2021-03-01 RX ADMIN — HYDROMORPHONE HYDROCHLORIDE 1 MG: 2 INJECTION, SOLUTION INTRAMUSCULAR; INTRAVENOUS; SUBCUTANEOUS at 09:55

## 2021-03-01 RX ADMIN — SODIUM CHLORIDE, SODIUM LACTATE, POTASSIUM CHLORIDE, AND CALCIUM CHLORIDE: 600; 310; 30; 20 INJECTION, SOLUTION INTRAVENOUS at 07:24

## 2021-03-01 RX ADMIN — SODIUM CHLORIDE, SODIUM LACTATE, POTASSIUM CHLORIDE, AND CALCIUM CHLORIDE: 600; 310; 30; 20 INJECTION, SOLUTION INTRAVENOUS at 08:12

## 2021-03-01 RX ADMIN — Medication 10 ML: at 06:30

## 2021-03-01 RX ADMIN — FAMOTIDINE 20 MG: 20 TABLET ORAL at 06:43

## 2021-03-01 RX ADMIN — TAMSULOSIN HYDROCHLORIDE 0.8 MG: 0.4 CAPSULE ORAL at 11:32

## 2021-03-01 NOTE — INTERVAL H&P NOTE
"  SUBJECTIVE:   Ivan Penn is a 10 year old male, here for a routine health maintenance visit,   accompanied by his { FAMILY MEMBERS:523306}.    Patient was roomed by: ***  Do you have any forms to be completed?  {YES CAPS/NO SMALL:927021::\"no\"}    SOCIAL HISTORY  Family members in house: { FAMILY MEMBERS:023802}  Language(s) spoken at home: {LANGUAGES SPOKEN:636691::\"English\"}  Recent family changes/social stressors: {FAMILY STRESS CHILD2:154801::\"none noted\"}    SAFETY/HEALTH RISKS  {TB exposure? ASK FIRST 4 QUESTIONS; CHECK NEXT 2 CONDITIONS :597840::\"TB exposure:  No\"}  {Parents monitor screen use?:374995::\"Do you monitor your child's screen use?  Yes\"}  Cardiac risk assessment:     Family history (males <55, females <65) of angina (chest pain), heart attack, heart surgery for clogged arteries, or stroke: { :325292::\"no\"}    Biological parent(s) with a total cholesterol over 240:  { :232114::\"no\"}    DENTAL  Dental health HIGH risk factors: {Dental Risk Factors 4+:085337::\"none\"}  Water source:  {Water source:678833::\"city water\"}    {SPORTS PHYSICAL NEEDED?:600818}    VISION{Required by C&TC every 2 years:432342}    HEARING{Required by C&TC:561212}    QUESTIONS/CONCERNS: {NONE/OTHER:202879::\"None\"}    {Adolescent interview:502737}    ROS  {ROS Choices:801158}    OBJECTIVE:   EXAM  There were no vitals taken for this visit.  No height on file for this encounter.  No weight on file for this encounter.  No height and weight on file for this encounter.  No blood pressure reading on file for this encounter.  {TEEN GENERAL EXAM 9 - 18 Y:861685::\"GENERAL: Active, alert, in no acute distress.\",\"SKIN: Clear. No significant rash, abnormal pigmentation or lesions\",\"HEAD: Normocephalic\",\"EYES: Pupils equal, round, reactive, Extraocular muscles intact. Normal conjunctivae.\",\"EARS: Normal canals. Tympanic membranes are normal; gray and translucent.\",\"NOSE: Normal without discharge.\",\"MOUTH/THROAT: Clear. No oral " Update History & Physical 
 
The Patient's History and Physical of February 16, 2021 was reviewed with the patient and I examined the patient. There was no change. The surgical site was confirmed by the patient and me. Plan:  The risk, benefits, expected outcome, and alternative to the recommended procedure have been discussed with the patient. Patient understands and wants to proceed with the procedure.  
 
Electronically signed by Jose A Mota MD on 3/1/2021 at 7:29 AM 
 "lesions. Teeth without obvious abnormalities.\",\"NECK: Supple, no masses.  No thyromegaly.\",\"LYMPH NODES: No adenopathy\",\"LUNGS: Clear. No rales, rhonchi, wheezing or retractions\",\"HEART: Regular rhythm. Normal S1/S2. No murmurs. Normal pulses.\",\"ABDOMEN: Soft, non-tender, not distended, no masses or hepatosplenomegaly. Bowel sounds normal. \",\"NEUROLOGIC: No focal findings. Cranial nerves grossly intact: DTR's normal. Normal gait, strength and tone\",\"BACK: Spine is straight, no scoliosis.\",\"EXTREMITIES: Full range of motion, no deformities\"}  {/Sports exams:339061}    ASSESSMENT/PLAN:   {Diagnosis Picklist:930812}    Anticipatory Guidance  {ANTICIPATORY 12-14 Y:652853::\"The following topics were discussed:\",\"SOCIAL/ FAMILY:\",\"NUTRITION:\",\"HEALTH/ SAFETY:\",\"SEXUALITY:\"}    Preventive Care Plan  Immunizations    {Vaccine counseling is expected when vaccines are given for the first time.   Vaccine counseling would not be expected for subsequent vaccines (after the first of the series) unless there is significant additional documentation:790866}  Referrals/Ongoing Specialty care: {C&TC :198395::\"No \"}  See other orders in [facility].  Cleared for sports:  {Yes No Not addressed:390374::\"Yes\"}  BMI at No height and weight on file for this encounter.  {BMI Evaluation - If BMI >/= 85th percentile for age, complete Obesity Action Plan:556493::\"No weight concerns.\"}  Dyslipidemia risk:    {Obtain 2 fasting lipid panels at least 2 weeks apart if any of the following apply :210832::\"None\"}  Dental visit recommended: {C&TC:943976::\"Yes\"}  {DENTAL VARNISH- C&TC/AAP recommended (F2 to skip):530249}    FOLLOW-UP:     { :913116::\"in 1 year for a Preventive Care visit\"}    Resources  HPV and Cancer Prevention:  What Parents Should Know  What Kids Should Know About HPV and Cancer  Goal Tracker: Be More Active  Goal Tracker: Less Screen Time  Goal Tracker: Drink More Water  Goal Tracker: Eat More Fruits and Veggies  [place] Child and " Teen Checkups (C&TC) Schedule of Age-Related Screening Standards    Elma Lemos, PA-C  [facility]

## 2021-03-01 NOTE — ANESTHESIA POSTPROCEDURE EVALUATION
Procedure(s):  ROBOTIC ASSISTED LAPAROSCOPIC RIGHT INGUINAL HERNIA REPAIR WITH MESH. general    Anesthesia Post Evaluation      Multimodal analgesia: multimodal analgesia used between 6 hours prior to anesthesia start to PACU discharge  Patient location during evaluation: bedside  Patient participation: complete - patient participated  Level of consciousness: awake  Pain management: adequate  Airway patency: patent  Anesthetic complications: no  Cardiovascular status: stable  Respiratory status: acceptable  Hydration status: acceptable  Post anesthesia nausea and vomiting:  controlled      INITIAL Post-op Vital signs:   Vitals Value Taken Time   /52 03/01/21 1046   Temp 37.1 °C (98.7 °F) 03/01/21 0937   Pulse 55 03/01/21 1055   Resp 20 03/01/21 1055   SpO2 90 % 03/01/21 1055   Vitals shown include unvalidated device data.

## 2021-03-01 NOTE — PERIOP NOTES
Canales catheterization attempted by SA after intubation, but unsuccessful. MD tried also using smaller size catheters and still unsuccessful. No bleeding or trauma noted to meatus after attempts.

## 2021-03-01 NOTE — OP NOTES
[address] [place] [address] [facility], [address] [facility]                                 OPERATIVE REPORT    PATIENT:    Charity Palacios  MRN:            [id number]      DATE:  3/1/2021  BILLIN  ROOM:        @BED@  ATTENDING:   Serjio Alejandro MD  DICTATING:   Serjio Alejandro MD    PREOPERATIVE DIAGNOSIS : Symptomatic reducible right inguinal hernia  POSTOPERATIVE DIAGNOSIS:  Pantaloon right inguinal hernia  PROCEDURES PERFORMED: Robot-assisted laparoscopic right inguinal hernia  repair with mesh. ESTIMATED BLOOD LOSS: 10 mL. FLUIDS GIVEN:  1000 mL. SPECIMENS REMOVED: direct hernia sac   SURGEON: Ramesh Louise MD   ASSISTANT: Jeimy Santizo SA  ANESTHESIA: General and local (0.25% Marcaine with epinephrine). FINDINGS: right inguinal hernia   IMPLANT: Bard 3D Max medium right  OPERATIVE INDICATION: Patient with symptomatic right inguinal hernias  DESCRIPTION OF PROCEDURE: The patient was identified in the holding area, where consent for robot-assisted laparoscopic right inguinal   hernia repair with mesh was verified. In the operating room, the patient   was placed under general anesthesia. Multiple cautious attempts were made to insert the Canales catheter with different gauge catheters and even a coudé tip catheter. The 12 Malaysian would not fit into the meatus of the urethra of the penis. The smaller catheters were not stiff enough to get through the prostate. The   abdomen was prepped and draped in sterile fashion using chlorhexidine   solution and sterile drapes. The time-out was performed to ensure correct   procedure. The local anesthetic was infiltrated into the skin and deep   dermal tissues at each proposed trocar site prior to the incision. The   initial incision was placed above the umbilicus. This was a  9 mm incision   to accommodate the 8 mm trocar and scope.  The [employer] system was used to safely enter the peritoneal cavity. Pneumoperitoneum was obtained using   carbon dioxide gas to 15 mmHg. The trocar in the left mid clavicular line   was then placed safely into the peritoneal cavity. This was an 8 mm robotic   trocar. The second 8 mm trocar was placed slightly cephalad and in the mid   clavicular line on the right. The patient was placed in Trendelenburg. The robot was then docked and the robotic instruments were safely inserted   into place under direct visualization. The peritoneum was then incised   using electrocautery at the right anterior superior iliac spine. This dissection was carried laterally from the ASIS, medially to the midline. The peritoneum was then reflected downward. The largest component of the hernia was a direct hernia. The fatty contents were dissected out of the hernia sac. The hernia sac was then dissected away from the abdominal wall musculature. The indirect hernia sac was bluntly dissected away from the cord and cord structures. The vasculature of the cord was densely adherent to the testicle. The hernia sac was then dissected away from the vasculature. The Bard 3D Max medium right mesh was inserted through the right-sided trocar. there was a lot of fatty tissue surrounding the vasculature. The inferior epigastric vein had to be ligated using the hemolock sutures. Safe entry into the peritoneal cavity was visualized. The 0- Vicryl and 2 - 0 V-Loc suture were also inserted through this trocar. The SutureCut needle    was then inserted into the right trocar. The mesh was positioned and   then sutured with a stitch in Nikita's ligament and medial in the upper   center of the mesh for fixation. The suture was inserted and the peritoneum was closed in a running fashion. The mesh set nicely against the abdominal wall. The peritoneal   closure was tension free. The 2 needles were removed from the peritoneal cavity.   The hernia sac was removed from the peritoneal cavity and sent for pathology. The trocars were removed under direct visualization. Pneumoperitoneum was allowed to deflate. A 4-0 Monocryl suture was used to close the skin at each trocar site. Mastisol, steristrips and bandaids were used for dressings. The patient tolerated the procedure very well. DISPOSITION: He was stable with the Canales removed, extubated upon transport   to the recovery room.

## 2021-03-01 NOTE — DISCHARGE INSTRUCTIONS
[facility]  Ramesh Holland MD, FACS  General Surgery    Pt may remove the dressing and shower in two days.  Allow soap and water to run over the incision.    No driving or operating heavy machinery while on narcotic pain medications.  Please apply an ice pack to the operative site for 30 minutes 3 times daily to help reduce pain and swelling and the need for narcotic pain medication.  No strenuous activity or contact sports for two weeks.   No lifting greater than 15 lbs for 2 weeks.    Call MD for any redness, swelling, bleeding or pus at the incision.  Also call for any nausea, vomiting, increased pain or pain uncontrolled by pain medicine.       DISCHARGE SUMMARY from Nurse  PATIENT INSTRUCTIONS:  After general anesthesia or intravenous sedation, for 24 hours or while taking prescription Narcotics:  · Limit your activities  · Do not drive and operate hazardous machinery  · Do not make important personal or business decisions  · Do  not drink alcoholic beverages  · If you have not urinated within 8 hours after discharge, please contact your surgeon on call.    Report the following to your surgeon:  · Excessive pain, swelling, redness or odor of or around the surgical area  · Temperature over 100.5  · Nausea and vomiting lasting longer than 4 hours or if unable to take medications  · Any signs of decreased circulation or nerve impairment to extremity: change in color, persistent  numbness, tingling, coldness or increase pain  · Any questions    What to do at Home:  Recommended activity: Activity as tolerated and no driving for today.  *  Please give a list of your current medications to your Primary Care Provider.    *  Please update this list whenever your medications are discontinued, doses are      changed, or new medications (including over-the-counter products) are added.    *  Please carry medication information at all times in case of emergency situations.    These are general  instructions for a healthy lifestyle:    No smoking/ No tobacco products/ Avoid exposure to second hand smoke  Surgeon General's Warning:  Quitting smoking now greatly reduces serious risk to your health. Obesity, smoking, and sedentary lifestyle greatly increases your risk for illness    A healthy diet, regular physical exercise & weight monitoring are important for maintaining a healthy lifestyle    You may be retaining fluid if you have a history of heart failure or if you experience any of the following symptoms:  Weight gain of 3 pounds or more overnight or 5 pounds in a week, increased swelling in our hands or feet or shortness of breath while lying flat in bed. Please call your doctor as soon as you notice any of these symptoms; do not wait until your next office visit. The discharge information has been reviewed with the patient. The patient verbalized understanding. Discharge medications reviewed with the patient and appropriate educational materials and side effects teaching were provided. ___________________________________________________________________________________________________________________________________    Patient Education        Inguinal Hernia Repair Surgery: What to Expect at Home  Your Recovery     After surgery to repair a hernia, you're likely to have pain for a few days. You may also feel like you have the flu. And you may have a low fever and feel tired and nauseated. This is common. You should start to feel better after a few days. And you'll probably feel much better in 7 days. For a few weeks you may feel twinges or pulling in the groin area when you move. Men may have some bruising on the scrotum and along the penis. This is normal.  This care sheet gives you a general idea about how long it will take for you to recover. But each person recovers at a different pace. Follow the steps below to get better as quickly as possible.   How can you care for yourself at home?  Activity    · Rest when you feel tired.     · You may shower 24 to 48 hours after surgery, if your doctor okays it. Pat the incision dry. Do not take a bath for the first 2 weeks, or until your doctor tells you it is okay.     · Allow the area to heal. Don't move quickly or lift anything heavy until you are feeling better.     · Be active. Walking is a good choice.     · You most likely can return to light activity after 1 to 3 weeks, depending on the type of surgery you had. Diet    · You can eat your normal diet. If your stomach is upset, try bland, low-fat foods like plain rice, broiled chicken, toast, and yogurt.     · If your bowel movements are not regular right after surgery, try to avoid constipation and straining. Drink plenty of water. Your doctor may suggest fiber, a stool softener, or a mild laxative. Medicines    · Be safe with medicines. Read and follow all instructions on the label. ? If the doctor gave you a prescription medicine for pain, take it as prescribed. ? If you are not taking a prescription pain medicine, ask your doctor if you can take an over-the-counter medicine.     · Your doctor will tell you if and when you can restart your medicines. He or she will also give you instructions about taking any new medicines. Incision care    · You will have a dressing over the cut (incision). A dressing helps the cut heal and protects it. Your doctor will tell you how to take care of this.     · If you have skin adhesive on the cut, leave it on until it falls off. Skin adhesive is also called liquid stitches or glue.     · If you have strips of tape on the cut, leave the tape on for a week or until it falls off.     · If you had stitches, your doctor will tell you when to come back to have them removed.     · Wash the area daily with warm, soapy water, and pat it dry. Don't use hydrogen peroxide or alcohol. They can slow healing.    Ice    · Put ice or a cold pack on the area for 10 to 20 minutes at a time. Try to do this every 1 to 2 hours for the next 3 days (when you are awake) or until the swelling goes down. Put a thin cloth between the ice and your skin. Follow-up care is a key part of your treatment and safety. Be sure to make and go to all appointments, and call your doctor if you are having problems. It's also a good idea to know your test results and keep a list of the medicines you take. When should you call for help? Call 911 anytime you think you may need emergency care. For example, call if:    · You passed out (lost consciousness).     · You are short of breath. Call your doctor now or seek immediate medical care if:    · You have pain that does not get better after you take pain medicine.     · You have loose stitches, or your incision comes open.     · Bright red blood has soaked through your bandage.     · You are sick to your stomach or cannot drink fluids.     · You have signs of a blood clot in your leg (called a deep vein thrombosis), such as:  ? Pain in your calf, back of the knee, thigh, or groin. ? Redness and swelling in your leg or groin.     · You cannot pass stools or gas.     · You have symptoms of infection, such as:  ? Increased pain, swelling, warmth, or redness. ? Red streaks leading from the incision. ? Pus draining from the incision. ? A fever. Watch closely for changes in your health, and be sure to contact your doctor if you have any problems. Where can you learn more? Go to http://www.gray.com/  Enter D758 in the search box to learn more about \"Inguinal Hernia Repair Surgery: What to Expect at Home. \"  Current as of: April 15, 2020               Content Version: 12.6  © 7720-6525 [employer]. Care instructions adapted under license by [employer] (which disclaims liability or warranty for this information).  If you have questions about a medical condition or this instruction, always ask your healthcare professional. [address] any warranty or liability for your use of this information.

## 2021-03-01 NOTE — DISCHARGE SUMMARY
[facility]  Candida Hunt MD, [facility]  General Surgery  Discharge Summary     Patient ID:  Burt Johnson  [id number]  59 y.o.  1985    Admit Date: 3/1/2021    Discharge Date: 3/1/2021    Admission Diagnoses: Right inguinal hernia [K40.90]    Discharge Diagnoses:    Problem List as of 3/1/2021 Date Reviewed: 2/16/2021          Codes Class Noted - Resolved    Right inguinal hernia ICD-10-CM: K40.90  ICD-9-CM: 550.90  3/1/2021 - Present        Type 2 diabetes with nephropathy ([facility] ICD-10-CM: E11.21  ICD-9-CM: 250.40, 583.81  7/16/2018 - Present        Obesity, morbid ([facility] ICD-10-CM: E66.01  ICD-9-CM: 278.01  7/9/2018 - Present        Hypokalemia ICD-10-CM: E87.6  ICD-9-CM: 276.8  6/18/2018 - Present        Cellulitis, scrotum ICD-10-CM: N49.2  ICD-9-CM: 608.4  6/14/2018 - Present        Sepsis ([facility] ICD-10-CM: A41.9  ICD-9-CM: 038.9, 995.91  6/14/2018 - Present        Cellulitis, perineum ICD-10-CM: L03.315  ICD-9-CM: 682.2  6/14/2018 - Present        Hyperglycemia due to type 2 diabetes mellitus ([facility] ICD-10-CM: E11.65  ICD-9-CM: 250.00  6/14/2018 - Present        Diabetes mellitus, type 2 ([facility] ICD-10-CM: E11.9  ICD-9-CM: 250.00  9/25/2014 - Present        Kidney stone ICD-10-CM: N20.0  ICD-9-CM: 592.0  9/26/2012 - Present        Motion sickness ICD-10-CM: T75. 3XXA  ICD-9-CM: 994.6  9/25/2012 - Present               Admission Condition: Good    Discharge Condition: Good    Last Procedure: Procedure(s):  ROBOTIC ASSISTED LAPAROSCOPIC RIGHT INGUINAL HERNIA REPAIR WITH MESH    Hospital Course:   Normal hospital course for this procedure. Consults: None    Significant Diagnostic Studies: None    Disposition: home    Patient Instructions:   Current Discharge Medication List      START taking these medications    Details   oxyCODONE IR (ROXICODONE) 5 mg immediate release tablet Take 1 Tab by mouth every four (4) hours as needed for Pain for up to 3 days.  Max Daily Amount: 30 mg.  Qty: 20 Tab, Refills: 0    Associated Diagnoses: Postoperative pain      acetaminophen (TYLENOL) 325 mg tablet Take 2 Tabs by mouth every six (6) hours. Indications: pain  Qty: 56 Tab, Refills: 1      ibuprofen (MOTRIN) 600 mg tablet Take 1 Tab by mouth every six (6) hours. Qty: 28 Tab, Refills: 0      docusate sodium (COLACE) 100 mg capsule Take 1 Cap by mouth two (2) times a day for 90 days. Qty: 60 Cap, Refills: 2      bisacodyL 5 mg tab Take 5 mg by mouth daily. Qty: 3 Tab, Refills: 0         CONTINUE these medications which have NOT CHANGED    Details   aspirin 81 mg chewable tablet Take 81 mg by mouth daily.       amLODIPine-benazepril (LOTREL) 10-40 mg per capsule TAKE 1 CAPSULE BY MOUTH  DAILY  Qty: 90 Cap, Refills: 0    Comments: Requesting 1 year supply APPOINTMENT  REQUIRED  BEFORE  NEXT  REFILL      fenofibrate nanocrystallized (TRICOR) 145 mg tablet TAKE 1 TABLET BY MOUTH  DAILY  Qty: 90 Tab, Refills: 0    Comments: Requesting 1 year supply APPOINTMENT  REQUIRED  BEFORE  NEXT  REFILL      metFORMIN (GLUCOPHAGE) 500 mg tablet TAKE 1 TABLET BY MOUTH  TWICE DAILY  Qty: 180 Tab, Refills: 0    Comments: Requesting 1 year supply APPOINTMENT  REQUIRED  BEFORE  NEXT  REFILL      OneTouch Ultra Blue Test Strip strip TEST TWO TIMES DAILY  Qty: 200 Strip, Refills: 3    Comments: Requesting 1 year supply  Associated Diagnoses: Type 2 diabetes mellitus with hyperglycemia, with long-term current use of insulin ([facility])      Lantus Solostar U-100 Insulin 100 unit/mL (3 mL) inpn INJECT SUBCUTANEOUSLY 20  UNITS NIGHTLY  Qty: 30 mL, Refills: 1    Associated Diagnoses: Type 2 diabetes mellitus with hyperglycemia, without long-term current use of insulin ([facility])      Insulin Needles, Disposable, 31 gauge x 5/16\" ndle Pen needled - To use with lantus solostar  Dx E11.65  Qty: 100 Pen Needle, Refills: 11    Associated Diagnoses: Type 2 diabetes mellitus with hyperglycemia, without long-term current use of insulin ([facility])      omeprazole (PRILOSEC) 20 mg capsule Take 1 Cap by mouth Daily (before breakfast). Qty: 30 Cap, Refills: 1      Blood-Glucose Meter monitoring kit One touch mini  Qty: 1 kit, Refills: 0    Associated Diagnoses: Diabetes mellitus, type 2 (HCC)           Activity: See surgical instructions  Diet: Low fat, Low cholesterol  Wound Care: As directed    Follow-up with Dr. Linda Terrell in 2 weeks.   Follow-up tests/labs None    Signed:  Nathaly Youssef MD  3/1/2021  9:45 AM

## 2021-03-01 NOTE — ANESTHESIA PREPROCEDURE EVALUATION
Relevant Problems   No relevant active problems       Anesthetic History               Review of Systems / Medical History  Nursing notes reviewed and pertinent labs reviewed    Pulmonary  Within defined limits                 Neuro/Psych   Within defined limits           Cardiovascular    Hypertension                   GI/Hepatic/Renal     GERD           Endo/Other    Diabetes    Morbid obesity     Other Findings              Physical Exam    Airway  Mallampati: II  TM Distance: 4 - 6 cm  Neck ROM: normal range of motion   Mouth opening: Normal     Cardiovascular  Regular rate and rhythm,  S1 and S2 normal,  no murmur, click, rub, or gallop  Rhythm: regular  Rate: normal         Dental  No notable dental hx       Pulmonary  Breath sounds clear to auscultation               Abdominal  GI exam deferred       Other Findings            Anesthetic Plan    ASA: 3  Anesthesia type: general          Induction: Intravenous  Anesthetic plan and risks discussed with: Patient      I have informed the patient or their guardian of the nature and purpose of the type of anesthesia, the reasonable alternative anesthetic methods, pertinent foreseeable risks involved and the possibility of complications. I have explained that an alternative form of anesthesia may be required by unexpected conditions arising before or during the procedure. It is understood that general anesthesia may be required for safety or comfort. Questions have been answered to the satisfaction of the patient who accepts the risks and agrees to proceed as planned. The above anesthetic review of medical history, physical exam, tests, assessment, subsequent anesthetic plan and consent have been accomplished pre-procedure.

## 2021-03-05 DIAGNOSIS — G89.18 POSTOPERATIVE PAIN: Primary | ICD-10-CM

## 2021-03-05 RX ORDER — IBUPROFEN 600 MG/1
600 TABLET ORAL EVERY 6 HOURS
Qty: 28 TAB | Refills: 0 | Status: SHIPPED | OUTPATIENT
Start: 2021-03-05 | End: 2021-03-25 | Stop reason: ALTCHOICE

## 2021-03-05 RX ORDER — HYDROMORPHONE HYDROCHLORIDE 2 MG/1
2 TABLET ORAL
Qty: 25 TAB | Refills: 0 | Status: SHIPPED | OUTPATIENT
Start: 2021-03-05 | End: 2021-03-12

## 2021-03-16 ENCOUNTER — OFFICE VISIT (OUTPATIENT)
Dept: SURGERY | Age: 36
End: 2021-03-16
Payer: COMMERCIAL

## 2021-03-16 VITALS
OXYGEN SATURATION: 99 % | WEIGHT: 315 LBS | TEMPERATURE: 97.7 F | SYSTOLIC BLOOD PRESSURE: 140 MMHG | DIASTOLIC BLOOD PRESSURE: 84 MMHG | HEART RATE: 86 BPM | RESPIRATION RATE: 18 BRPM | BODY MASS INDEX: 44.1 KG/M2 | HEIGHT: 71 IN

## 2021-03-16 DIAGNOSIS — Z09 POSTOPERATIVE EXAMINATION: ICD-10-CM

## 2021-03-16 DIAGNOSIS — K40.90 RIGHT INGUINAL HERNIA: Primary | ICD-10-CM

## 2021-03-16 PROCEDURE — 99024 POSTOP FOLLOW-UP VISIT: CPT | Performed by: SURGERY

## 2021-03-16 NOTE — PROGRESS NOTES
Salo Leblanc is a 38 y.o. male  Chief Complaint   Patient presents with    Post OP Follow Up     3/1/21 right inguinal hernia repair with mesh     Pt c/o occ pain in right lower groin area that comes and goes. Mostly in the morning time.

## 2021-03-16 NOTE — PROGRESS NOTES
[facility]  General Surgery    Name: Charity Palacios MRN: [id number]   : 1985 Hospital: [facility]   Date: 3/16/2021 Admission Date: No admission date for patient encounter. Subjective:  Patient without complaints except for a ripping type pain when he goes from a supine to a sitting position in the left groin. I explained to the patient that hernias he will throw process of contraction and fibrosis of the mesh into the surrounding tissues so the stretching or pulling sensation which he is experiencing is completely normal.  He also had a another complaint of not been able to lay on his stomach which also explained was completely normal because of the pain that he is experiencing. He denies any nausea vomiting diarrhea constipation. Objective:  Vitals:    21 1309   BP: (!) 140/84   Pulse: 86   Resp: 18   Temp: 97.7 °F (36.5 °C)   TempSrc: Temporal   SpO2: 99%   Weight: 153.8 kg (339 lb)   Height: 5' 11\" (1.803 m)       Physical Exam:    General: Awake and alert, oriented x4, no apparent distress   Abdomen: abdomen is soft with right lower quadrant tenderness. Incision(s) are C/D/I. No evidence of hernia recurrence with cough or Valsalva. No masses, organomegaly or guarding    Current Medications:  Current Outpatient Medications   Medication Sig Dispense Refill    metFORMIN (GLUCOPHAGE) 500 mg tablet TAKE 1 TABLET BY MOUTH  TWICE DAILY 180 Tab 1    fenofibrate nanocrystallized (TRICOR) 145 mg tablet TAKE 1 TABLET BY MOUTH  DAILY 90 Tab 1    ibuprofen (MOTRIN) 600 mg tablet Take 1 Tab by mouth every six (6) hours. 28 Tab 0    aspirin 81 mg chewable tablet Take 81 mg by mouth daily.       amLODIPine-benazepril (LOTREL) 10-40 mg per capsule TAKE 1 CAPSULE BY MOUTH  DAILY 90 Cap 0    OneTouch Ultra Blue Test Strip strip TEST TWO TIMES DAILY 200 Strip 3    Lantus Solostar U-100 Insulin 100 unit/mL (3 mL) inpn INJECT SUBCUTANEOUSLY 20  UNITS NIGHTLY 30 mL 1  Insulin Needles, Disposable, 31 gauge x 5/16\" ndle Pen needled - To use with lantus solostar  Dx E11.65 100 Pen Needle 11    omeprazole (PRILOSEC) 20 mg capsule Take 1 Cap by mouth Daily (before breakfast). 30 Cap 1    Blood-Glucose Meter monitoring kit One touch mini 1 kit 0    docusate sodium (COLACE) 100 mg capsule Take 1 Cap by mouth two (2) times a day for 90 days. 60 Cap 2       Chart and notes reviewed. Data reviewed. I have evaluated and examined the patient. IMPRESSION:   · Patient is 2 weeks out from robot-assisted laparoscopic right inguinal hernia repair with mesh.       PLAN:/DISCUSION:   · Continue lifting restrictions   · follow-up in 4 weeks for assessment for return to work          Cristin Valerio MD

## 2021-03-20 DIAGNOSIS — E11.65 TYPE 2 DIABETES MELLITUS WITH HYPERGLYCEMIA, WITHOUT LONG-TERM CURRENT USE OF INSULIN (HCC): ICD-10-CM

## 2021-03-25 ENCOUNTER — VIRTUAL VISIT (OUTPATIENT)
Dept: FAMILY MEDICINE CLINIC | Age: 36
End: 2021-03-25
Payer: COMMERCIAL

## 2021-03-25 DIAGNOSIS — R53.83 FATIGUE, UNSPECIFIED TYPE: ICD-10-CM

## 2021-03-25 DIAGNOSIS — E11.65 TYPE 2 DIABETES MELLITUS WITH HYPERGLYCEMIA, WITHOUT LONG-TERM CURRENT USE OF INSULIN (HCC): Primary | ICD-10-CM

## 2021-03-25 DIAGNOSIS — R31.9 HEMATURIA, UNSPECIFIED TYPE: ICD-10-CM

## 2021-03-25 DIAGNOSIS — E78.00 PURE HYPERCHOLESTEROLEMIA: ICD-10-CM

## 2021-03-25 DIAGNOSIS — I10 ESSENTIAL HYPERTENSION: ICD-10-CM

## 2021-03-25 PROCEDURE — 99213 OFFICE O/P EST LOW 20 MIN: CPT | Performed by: NURSE PRACTITIONER

## 2021-03-25 RX ORDER — PEN NEEDLE, DIABETIC 30 GX3/16"
NEEDLE, DISPOSABLE MISCELLANEOUS
Qty: 1 PACKAGE | Refills: 3 | Status: SHIPPED | OUTPATIENT
Start: 2021-03-25 | End: 2021-07-19

## 2021-03-25 RX ORDER — AMLODIPINE BESYLATE AND BENAZEPRIL HYDROCHLORIDE 10; 40 MG/1; MG/1
CAPSULE ORAL
Qty: 90 CAP | Refills: 3 | Status: SHIPPED | OUTPATIENT
Start: 2021-03-25 | End: 2021-04-22 | Stop reason: ALTCHOICE

## 2021-03-25 NOTE — PROGRESS NOTES
Iker Alejandro is a 38 y.o. male who was seen by synchronous (real-time) audio-video technology on 3/25/2021 for No chief complaint on file. Assessment & Plan:   Diagnoses and all orders for this visit:    1. Type 2 diabetes mellitus with hyperglycemia, without long-term current use of insulin ([facility] [address]    2. Fatigue, unspecified type  -     VITAMIN D, 25 HYDROXY; Future  -     TSH 3RD GENERATION; Future  -     T4, FREE; Future  -     T3 TOTAL; Future  -     CBC WITH AUTOMATED DIFF; Future    3. Hematuria, unspecified type  -     REFERRAL TO UROLOGY  -     PSA, TOTAL &  FREE; Future    4. Essential hypertension    5. Pure hypercholesterolemia      Follow-up and Dispositions    · Return in about 4 weeks (around 4/22/2021) for diabetes, virtual follow up. Routing History        I spent at least 20 minutes on this visit with this established patient. 712  Subjective:   Diabetic Review of Systems - medication compliance: compliant all of the time, diet compliance: noncompliant some of the time, home glucose monitoring: is performed regularly, fasting values range 140s-160s, further ROS: no polyuria or polydipsia, no chest pain, dyspnea or TIA's, no numbness, tingling or pain in extremities. Patient reports he has not been exercising. Patient states for the last month he has been fatigued mostly at night. Patient also states he noticed 3 episodes of blood with urination on Sunday and then symptoms resolved. Has not noticed since. Patient states he has not seen urology since 2018 after his follow up for scrotal abscess. Prior to Admission medications    Medication Sig Start Date End Date Taking? Authorizing Provider   metFORMIN (GLUCOPHAGE) 500 mg tablet TAKE 1 TABLET BY MOUTH  TWICE DAILY 3/6/21   Hannah Carota T, NP   fenofibrate nanocrystallized (TRICOR) 145 mg tablet TAKE 1 TABLET BY MOUTH  DAILY 3/6/21   Hannah Carota T, NP   ibuprofen (MOTRIN) 600 mg tablet Take 1 Tab by mouth every six (6) hours. 3/5/21   Dean Dowd MD   docusate sodium (COLACE) 100 mg capsule Take 1 Cap by mouth two (2) times a day for 90 days. 3/1/21 5/30/21  Dean Dowd MD   aspirin 81 mg chewable tablet Take 81 mg by mouth daily. Provider, Historical   amLODIPine-benazepril (LOTREL) 10-40 mg per capsule TAKE 1 CAPSULE BY MOUTH  DAILY 12/23/20   Arabella Rosa NP   OneTouch Ultra Blue Test Strip strip TEST TWO TIMES DAILY 9/23/20   Nasir SKINNER NP   Lantus Solostar U-100 Insulin 100 unit/mL (3 mL) inpn INJECT SUBCUTANEOUSLY 20  UNITS NIGHTLY 7/8/20   Nasir SKINNER NP   Insulin Needles, Disposable, 31 gauge x 5/16\" ndle Pen needled - To use with lantus solostar  Dx E11.65 2/17/20   Nasir SKINNER NP   omeprazole (PRILOSEC) 20 mg capsule Take 1 Cap by mouth Daily (before breakfast). 3/11/19   Nasir SKINNER NP   Blood-Glucose Meter monitoring kit One touch mini 9/10/14   Arabella Rosa NP     Patient Active Problem List   Diagnosis Code    Motion sickness T75. 3XXA    Kidney stone N20.0    Diabetes mellitus, type 2 (HCC) E11.9    Cellulitis, scrotum N49.2    Sepsis ([facility]) A41.9    Cellulitis, perineum L03.315    Hyperglycemia due to type 2 diabetes mellitus ([address] E11.65    Hypokalemia E87.6    Obesity, morbid ([facility]) E66.01    Type 2 diabetes with nephropathy ([facility]) E11.21    Right inguinal hernia K40.90     Patient Active Problem List    Diagnosis Date Noted    Right inguinal hernia 03/01/2021    Type 2 diabetes with nephropathy ([address] 07/16/2018    Obesity, morbid ([address] 07/09/2018    Hypokalemia 06/18/2018    Cellulitis, scrotum 06/14/2018    Sepsis ([address] 06/14/2018    Cellulitis, perineum 06/14/2018    Hyperglycemia due to type 2 diabetes mellitus ([address] 06/14/2018    Diabetes mellitus, type 2 ([address] 09/25/2014    Kidney stone 09/26/2012    Motion sickness 09/25/2012     Current Outpatient Medications   Medication Sig Dispense Refill    amLODIPine-benazepril (LOTREL) 10-40 mg per capsule TAKE 1 CAPSULE BY MOUTH  DAILY 90 Cap 3    Insulin Needles, Disposable, (BD Ultra-Fine Short Pen Needle) 31 gauge x 5/16\" ndle USE WITH LANTUS SOLOSTAR 1 Package 3    metFORMIN (GLUCOPHAGE) 500 mg tablet TAKE 1 TABLET BY MOUTH  TWICE DAILY 180 Tab 1    fenofibrate nanocrystallized (TRICOR) 145 mg tablet TAKE 1 TABLET BY MOUTH  DAILY 90 Tab 1    ibuprofen (MOTRIN) 600 mg tablet Take 1 Tab by mouth every six (6) hours. 28 Tab 0    docusate sodium (COLACE) 100 mg capsule Take 1 Cap by mouth two (2) times a day for 90 days. 60 Cap 2    aspirin 81 mg chewable tablet Take 81 mg by mouth daily.  OneTouch Ultra Blue Test Strip strip TEST TWO TIMES DAILY 200 Strip 3    Lantus Solostar U-100 Insulin 100 unit/mL (3 mL) inpn INJECT SUBCUTANEOUSLY 20  UNITS NIGHTLY 30 mL 1    omeprazole (PRILOSEC) 20 mg capsule Take 1 Cap by mouth Daily (before breakfast). 30 Cap 1    Blood-Glucose Meter monitoring kit One touch mini 1 kit 0     Allergies   Allergen Reactions    Tylenol Cold [Kco-Bvowknwog-Df-Acetaminophen] Swelling     Past Medical History:   Diagnosis Date    Diabetes mellitus, type 2 ([address] 9/25/2014    GERD (gastroesophageal reflux disease)     Hypertension     Kidney stone 9/26/2012    Kidney stones      Past Surgical History:   Procedure Laterality Date    HX KNEE ARTHROSCOPY      HX UROLOGICAL      kidney stone extraction     Family History   Problem Relation Age of Onset    Cancer Father     Hypertension Father     Diabetes Father     Cancer Paternal Uncle     Hypertension Mother      Social History     Tobacco Use    Smoking status: Never Smoker    Smokeless tobacco: Never Used   Substance Use Topics    Alcohol use: Yes     Comment: rare       ROS    Objective:   No flowsheet data found.    General: alert, cooperative, no distress   Mental  status: normal mood, behavior, speech, dress, motor activity, and thought processes, able to follow commands   HENT: NCAT   Neck: no visualized mass   Resp: no respiratory distress   Neuro: no gross deficits   Skin: no discoloration or lesions of concern on visible areas   Psychiatric: normal affect, consistent with stated mood, no evidence of hallucinations     Additional exam findings: We discussed the expected course, resolution and complications of the diagnosis(es) in detail. Medication risks, benefits, costs, interactions, and alternatives were discussed as indicated. I advised him to contact the office if his condition worsens, changes or fails to improve as anticipated. He expressed understanding with the diagnosis(es) and plan. Kayy Cabezas was evaluated through a synchronous (real-time) audio-video encounter. The patient (or guardian if applicable) is aware that this is a billable service. Verbal consent to proceed has been obtained within the past 12 months. The visit was conducted pursuant to the emergency declaration under the [address] [place] [address], [address] [place] [address] authority and the [employer] General Act. Patient identification was verified, and a caregiver was present when appropriate. The patient was located in a state where the provider was credentialed to provide care.     China Mckay NP

## 2021-04-02 DIAGNOSIS — E11.65 TYPE 2 DIABETES MELLITUS WITH HYPERGLYCEMIA, WITHOUT LONG-TERM CURRENT USE OF INSULIN (HCC): ICD-10-CM

## 2021-04-02 RX ORDER — INSULIN GLARGINE 100 [IU]/ML
INJECTION, SOLUTION SUBCUTANEOUS
Qty: 30 ML | Refills: 3 | Status: SHIPPED | OUTPATIENT
Start: 2021-04-02 | End: 2021-04-07

## 2021-04-06 DIAGNOSIS — E11.65 TYPE 2 DIABETES MELLITUS WITH HYPERGLYCEMIA, WITHOUT LONG-TERM CURRENT USE OF INSULIN (HCC): ICD-10-CM

## 2021-04-07 RX ORDER — INSULIN GLARGINE 100 [IU]/ML
INJECTION, SOLUTION SUBCUTANEOUS
Qty: 30 ML | Refills: 3 | Status: SHIPPED | OUTPATIENT
Start: 2021-04-07 | End: 2022-06-28

## 2021-04-20 ENCOUNTER — OFFICE VISIT (OUTPATIENT)
Dept: SURGERY | Age: 36
End: 2021-04-20
Payer: COMMERCIAL

## 2021-04-20 VITALS
SYSTOLIC BLOOD PRESSURE: 143 MMHG | WEIGHT: 315 LBS | HEART RATE: 79 BPM | DIASTOLIC BLOOD PRESSURE: 94 MMHG | RESPIRATION RATE: 18 BRPM | TEMPERATURE: 98.3 F | BODY MASS INDEX: 44.1 KG/M2 | HEIGHT: 71 IN

## 2021-04-20 DIAGNOSIS — Z09 POSTOPERATIVE EXAMINATION: Primary | ICD-10-CM

## 2021-04-20 PROCEDURE — 99024 POSTOP FOLLOW-UP VISIT: CPT | Performed by: SURGERY

## 2021-04-20 NOTE — PROGRESS NOTES
Laurent Sanabria is a 38 y.o. male  Chief Complaint   Patient presents with    Post OP Follow Up     3/1/2021 right inguinal hernia repair with mesh         Is someone accompanying this pt? no    Is the patient using any DME equipment during OV? no        Vitals:    04/20/21 0920 04/20/21 0922   BP: (!) 144/96 (!) 143/94   Pulse: 77 79   Resp: 18    Temp: 98.3 °F (36.8 °C)    Weight: 336 lb (152.4 kg)    Height: 5' 11\" (1.803 m)     Mr. Andrei Alford has been given the following recommendations today due to his elevated BP reading: referred to Alternative/PCP. Depression Screening:  3 most recent PHQ Screens 3/25/2021   PHQ Not Done -   Little interest or pleasure in doing things Not at all   Feeling down, depressed, irritable, or hopeless Not at all   Total Score PHQ 2 0   Trouble falling or staying asleep, or sleeping too much Not at all   Feeling tired or having little energy Not at all   Poor appetite, weight loss, or overeating Not at all   Feeling bad about yourself - or that you are a failure or have let yourself or your family down Not at all   Trouble concentrating on things such as school, work, reading, or watching TV Not at all   Moving or speaking so slowly that other people could have noticed; or the opposite being so fidgety that others notice Not at all   Thoughts of being better off dead, or hurting yourself in some way Not at all   PHQ 9 Score 0   How difficult have these problems made it for you to do your work, take care of your home and get along with others Not difficult at all       Learning Assessment:  Learning Assessment 9/9/2014   PRIMARY LEARNER Patient   PRIMARY LANGUAGE ENGLISH   LEARNER PREFERENCE PRIMARY DEMONSTRATION   ANSWERED BY patient   RELATIONSHIP SELF         Fall Risk  Fall Risk Assessment, last 12 mths 3/16/2021   Able to walk? Yes   Fall in past 12 months? 0   Do you feel unsteady?  0   Are you worried about falling 0       Health Maintenance reviewed and discussed and ordered per Provider. Coordination of Care:  1. Have you been to the ER, urgent care clinic since your last visit? Hospitalized since your last visit? no    2. Have you seen or consulted any other health care providers outside of the [address] [place] [address] since your last visit? Include any pap smears or colon screening.  no

## 2021-04-20 NOTE — LETTER
NOTIFICATION OF RETURN TO WORK / SCHOOL 
 
4/20/2021 9:46 AM 
 
Mr. Katharina Braun [facility] [id number] [place] To Whom It May Concern: 
 
Katharina Braun was under the care of [address] from March 1, 2021 to present. He has restrictions of no squatting, and no lifting more than 40 lbs, . If there are questions or concerns please have the patient contact our office. Sincerely, Kami Vincent MD

## 2021-04-20 NOTE — PROGRESS NOTES
[facility]  General Surgery    Name: Bandar Estrada MRN: [id number]   : 1985 Hospital: [facility]   Date: 2021 Admission Date: No admission date for patient encounter. Subjective:  Patient returns today following robot-assisted laparoscopic right little hernia repair with mesh 6 weeks since surgery. He denies any new complaints. Objective:  Vitals:    21 0920 21 0922   BP: (!) 144/96 (!) 143/94   Pulse: 77 79   Resp: 18    Temp: 98.3 °F (36.8 °C)    Weight: 152.4 kg (336 lb)    Height: 5' 11\" (1.803 m)        Physical Exam:    General: Awake and alert, oriented x4, no apparent distress   Abdomen: abdomen is soft with minimal tenderness. Incision(s) are C/D/I. No evidence of hernia recurrence with cough. No masses, organomegaly or guarding    Current Medications:  Current Outpatient Medications   Medication Sig Dispense Refill    Lantus Solostar U-100 Insulin 100 unit/mL (3 mL) inpn INJECT SUBCUTANEOUSLY 20  UNITS AT NIGHT 30 mL 3    SITagliptin-metFORMIN (JANUMET) 50-1,000 mg per tablet Take 1 Tab by mouth two (2) times daily (with meals). 180 Tab 0    amLODIPine-benazepril (LOTREL) 10-40 mg per capsule TAKE 1 CAPSULE BY MOUTH  DAILY 90 Cap 3    Insulin Needles, Disposable, (BD Ultra-Fine Short Pen Needle) 31 gauge x 5/16\" ndle USE WITH LANTUS SOLOSTAR 1 Package 3    metFORMIN (GLUCOPHAGE) 500 mg tablet TAKE 1 TABLET BY MOUTH  TWICE DAILY 180 Tab 1    fenofibrate nanocrystallized (TRICOR) 145 mg tablet TAKE 1 TABLET BY MOUTH  DAILY 90 Tab 1    aspirin 81 mg chewable tablet Take 81 mg by mouth daily.  OneTouch Ultra Blue Test Strip strip TEST TWO TIMES DAILY 200 Strip 3    omeprazole (PRILOSEC) 20 mg capsule Take 1 Cap by mouth Daily (before breakfast). 30 Cap 1    Blood-Glucose Meter monitoring kit One touch mini 1 kit 0       Chart and notes reviewed. Data reviewed. I have evaluated and examined the patient.         IMPRESSION: · Patient doing well 6 weeks out from right inguinal hernia repair with mesh. PLAN:/DISCUSION:   · He may return to work without restrictions.   · Follow-up as needed        Shaila Shanks MD

## 2021-04-22 ENCOUNTER — VIRTUAL VISIT (OUTPATIENT)
Dept: FAMILY MEDICINE CLINIC | Age: 36
End: 2021-04-22
Payer: COMMERCIAL

## 2021-04-22 DIAGNOSIS — E11.65 TYPE 2 DIABETES MELLITUS WITH HYPERGLYCEMIA, WITHOUT LONG-TERM CURRENT USE OF INSULIN (HCC): Primary | ICD-10-CM

## 2021-04-22 DIAGNOSIS — I10 ESSENTIAL HYPERTENSION: ICD-10-CM

## 2021-04-22 DIAGNOSIS — E78.00 PURE HYPERCHOLESTEROLEMIA: ICD-10-CM

## 2021-04-22 PROCEDURE — 99213 OFFICE O/P EST LOW 20 MIN: CPT | Performed by: NURSE PRACTITIONER

## 2021-04-22 RX ORDER — AMLODIPINE AND VALSARTAN 10; 320 MG/1; MG/1
1 TABLET ORAL DAILY
Qty: 90 TAB | Refills: 0 | Status: SHIPPED | OUTPATIENT
Start: 2021-04-22 | End: 2021-06-04 | Stop reason: SDUPTHER

## 2021-04-22 NOTE — PROGRESS NOTES
Matt Chilel is a 38 y.o. male who was seen by synchronous (real-time) audio-video technology on 4/22/2021 for No chief complaint on file. Assessment & Plan:   Diagnoses and all orders for this visit:    1. Type 2 diabetes mellitus with hyperglycemia, without long-term current use of insulin ([address]    2. Essential hypertension    3. Pure hypercholesterolemia    Other orders  -     amLODIPine-valsartan (EXFORGE)  mg per tablet; Take 1 Tab by mouth daily. Follow-up and Dispositions    · Return in about 6 weeks (around 6/3/2021) for DM/HTN/HLD, in office follow up with POC A1C. Routing History        I spent at least 20 minutes on this visit with this established patient. 712  Subjective:   Diabetic Review of Systems - medication compliance: compliant all of the time, diet compliance: noncompliant some of the time, home glucose monitoring: is performed regularly, fasting values range 143 this morning, 190-after eating, further ROS: no polyuria or polydipsia, no chest pain, dyspnea or TIA's, no numbness, tingling or pain in extremities. Patient states he started walking around the neighborhood as of late. Prior to Admission medications    Medication Sig Start Date End Date Taking? Authorizing Provider   Lantus Solostar U-100 Insulin 100 unit/mL (3 mL) inpn INJECT SUBCUTANEOUSLY 20  UNITS AT NIGHT 4/7/21   Genaro SKINNER NP   SITagliptin-metFORMIN (JANUMET) 50-1,000 mg per tablet Take 1 Tab by mouth two (2) times daily (with meals).  4/1/21   Genaro SKINNER NP   amLODIPine-benazepril (LOTREL) 10-40 mg per capsule TAKE 1 CAPSULE BY MOUTH  DAILY 3/25/21   Genaro SKINNER NP   Insulin Needles, Disposable, (BD Ultra-Fine Short Pen Needle) 31 gauge x 5/16\" ndle USE WITH LANTUS SOLOSTAR 3/25/21   Genaro SKINNER NP   metFORMIN (GLUCOPHAGE) 500 mg tablet TAKE 1 TABLET BY MOUTH  TWICE DAILY 3/6/21   Genaro SKINNER NP   fenofibrate nanocrystallized (TRICOR) 145 mg tablet TAKE 1 TABLET BY MOUTH DAILY 3/6/21   Marylyn Nissen T, NP   aspirin 81 mg chewable tablet Take 81 mg by mouth daily. Provider, Historical   OneTouch Ultra Blue Test Strip strip TEST TWO TIMES DAILY 9/23/20   Marylyn Nissen T, NP   omeprazole (PRILOSEC) 20 mg capsule Take 1 Cap by mouth Daily (before breakfast). 3/11/19   Marylyn Nissen T, NP   Blood-Glucose Meter monitoring kit One touch mini 9/10/14   Jame Junior NP     Patient Active Problem List   Diagnosis Code    Motion sickness T75. 3XXA    Kidney stone N20.0    Diabetes mellitus, type 2 ([address] E11.9    Cellulitis, scrotum N49.2    Sepsis ([facility] A41.9    Cellulitis, perineum L03.315    Hyperglycemia due to type 2 diabetes mellitus ([address] E11.65    Hypokalemia E87.6    Obesity, morbid ([facility]) E66.01    Type 2 diabetes with nephropathy ([facility]) E11.21    Right inguinal hernia K40.90     Patient Active Problem List    Diagnosis Date Noted    Right inguinal hernia 03/01/2021    Type 2 diabetes with nephropathy ([address] 07/16/2018    Obesity, morbid ([address] 07/09/2018    Hypokalemia 06/18/2018    Cellulitis, scrotum 06/14/2018    Sepsis ([address] 06/14/2018    Cellulitis, perineum 06/14/2018    Hyperglycemia due to type 2 diabetes mellitus ([address] 06/14/2018    Diabetes mellitus, type 2 ([address] 09/25/2014    Kidney stone 09/26/2012    Motion sickness 09/25/2012     Current Outpatient Medications   Medication Sig Dispense Refill    Lantus Solostar U-100 Insulin 100 unit/mL (3 mL) inpn INJECT SUBCUTANEOUSLY 20  UNITS AT NIGHT 30 mL 3    SITagliptin-metFORMIN (JANUMET) 50-1,000 mg per tablet Take 1 Tab by mouth two (2) times daily (with meals).  180 Tab 0    amLODIPine-benazepril (LOTREL) 10-40 mg per capsule TAKE 1 CAPSULE BY MOUTH  DAILY 90 Cap 3    Insulin Needles, Disposable, (BD Ultra-Fine Short Pen Needle) 31 gauge x 5/16\" ndle USE WITH LANTUS SOLOSTAR 1 Package 3    metFORMIN (GLUCOPHAGE) 500 mg tablet TAKE 1 TABLET BY MOUTH  TWICE DAILY 180 Tab 1    fenofibrate nanocrystallized (TRICOR) 145 mg tablet TAKE 1 TABLET BY MOUTH  DAILY 90 Tab 1    aspirin 81 mg chewable tablet Take 81 mg by mouth daily.  OneTouch Ultra Blue Test Strip strip TEST TWO TIMES DAILY 200 Strip 3    omeprazole (PRILOSEC) 20 mg capsule Take 1 Cap by mouth Daily (before breakfast). 30 Cap 1    Blood-Glucose Meter monitoring kit One touch mini 1 kit 0     Allergies   Allergen Reactions    Tylenol Cold [Bdl-[place]-Ul-Acetaminophen] Swelling     Past Medical History:   Diagnosis Date    Diabetes mellitus, type 2 ([facility] 9/25/2014    GERD (gastroesophageal reflux disease)     Hypertension     Kidney stone 9/26/2012    Kidney stones      Past Surgical History:   Procedure Laterality Date    HX KNEE ARTHROSCOPY      HX UROLOGICAL      kidney stone extraction     Family History   Problem Relation Age of Onset    Cancer Father     Hypertension Father     Diabetes Father     Cancer Paternal Uncle     Hypertension Mother      Social History     Tobacco Use    Smoking status: Never Smoker    Smokeless tobacco: Never Used   Substance Use Topics    Alcohol use: Yes     Comment: rare       ROS    Objective:   No flowsheet data found. General: alert, cooperative, no distress   Mental  status: normal mood, behavior, speech, dress, motor activity, and thought processes, able to follow commands   HENT: NCAT   Neck: no visualized mass   Resp: no respiratory distress   Neuro: no gross deficits   Skin: no discoloration or lesions of concern on visible areas   Psychiatric: normal affect, consistent with stated mood, no evidence of hallucinations     Additional exam findings: We discussed the expected course, resolution and complications of the diagnosis(es) in detail. Medication risks, benefits, costs, interactions, and alternatives were discussed as indicated. I advised him to contact the office if his condition worsens, changes or fails to improve as anticipated.  He expressed understanding with the diagnosis(es) and plan. Laurent Sanabria, was evaluated through a synchronous (real-time) audio-video encounter. The patient (or guardian if applicable) is aware that this is a billable service. Verbal consent to proceed has been obtained within the past 12 months. The visit was conducted pursuant to the emergency declaration under the [address] [place] [address] and the [employer] and [employer] General Act. Patient identification was verified, and a caregiver was present when appropriate. The patient was located in a state where the provider was credentialed to provide care.     Tejal Luna NP

## 2021-05-18 ENCOUNTER — OFFICE VISIT (OUTPATIENT)
Dept: SURGERY | Age: 36
End: 2021-05-18
Payer: COMMERCIAL

## 2021-05-18 VITALS
RESPIRATION RATE: 18 BRPM | HEART RATE: 70 BPM | WEIGHT: 315 LBS | TEMPERATURE: 98.6 F | HEIGHT: 71 IN | OXYGEN SATURATION: 99 % | BODY MASS INDEX: 44.1 KG/M2 | SYSTOLIC BLOOD PRESSURE: 141 MMHG | DIASTOLIC BLOOD PRESSURE: 93 MMHG

## 2021-05-18 DIAGNOSIS — Z09 POSTOPERATIVE EXAMINATION: Primary | ICD-10-CM

## 2021-05-18 PROCEDURE — 99024 POSTOP FOLLOW-UP VISIT: CPT | Performed by: SURGERY

## 2021-05-18 NOTE — PROGRESS NOTES
Freida Hylton is a 38 y.o. male  Chief Complaint   Patient presents with    Follow-up     3/1/21 right inguinal hernia repair     Visit Vitals  BP (!) 141/93   Pulse 70   Temp 98.6 °F (37 °C)   Resp 18   Ht 5' 11\" (1.803 m)   Wt 340 lb (154.2 kg)   SpO2 99%   BMI 47.42 kg/m²     Mr. Vahe Aquino has been given the following recommendations today due to his elevated BP reading: referred to Alternative/PCP.

## 2021-05-18 NOTE — LETTER
NOTIFICATION OF RETURN TO WORK / SCHOOL 
 
5/18/2021 9:24 AM 
 
Mr. Annalisa Hernandez [facility] [id number] [place] To Whom It May Concern: 
 
Annalisa Hernandez was under the care of [address] from 3/1/2021 to 05/19/2021. He will be able to return to work/school on 5/19/2021 with no restrictions. If there are questions or concerns please have the patient contact our office. Sincerely, Dorothy Jacinto MD

## 2021-05-18 NOTE — PROGRESS NOTES
[facility]  General Surgery    Name: Frida Watson MRN: [id number]   : 1985 Hospital: [facility]   Date: 2021 Admission Date: No admission date for patient encounter. Subjective:  Patient returns today 6 weeks out from robot-assisted laparoscopic right inguinal hernia repair with mesh. He states that he feels like he is ready to return to work. Objective:  Vitals:    21 0855 21 0901   BP: (!) 170/116 (!) 141/93   Pulse: 70    Resp: 18    Temp: 98.6 °F (37 °C)    SpO2: 99%    Weight: 154.2 kg (340 lb)    Height: 5' 11\" (1.803 m)        Physical Exam:    General: Awake and alert, oriented x4, no apparent distress   Abdomen: abdomen is soft with minimal tenderness. Incision(s) are C/D/I. No   masses, organomegaly or guarding    Current Medications:  Current Outpatient Medications   Medication Sig Dispense Refill    acyclovir (ZOVIRAX) 800 mg tablet Take 1 Tab by mouth five (5) times daily for 7 days. 35 Tab 0    cephALEXin (Keflex) 500 mg capsule Take 1 Cap by mouth three (3) times daily for 7 days. 21 Cap 0    tamsulosin (FLOMAX) 0.4 mg capsule Take 1 Cap by mouth daily (after dinner). 90 Cap 3    amLODIPine-valsartan (EXFORGE)  mg per tablet Take 1 Tab by mouth daily. 90 Tab 0    Lantus Solostar U-100 Insulin 100 unit/mL (3 mL) inpn INJECT SUBCUTANEOUSLY 20  UNITS AT NIGHT 30 mL 3    SITagliptin-metFORMIN (JANUMET) 50-1,000 mg per tablet Take 1 Tab by mouth two (2) times daily (with meals). 180 Tab 0    Insulin Needles, Disposable, (BD Ultra-Fine Short Pen Needle) 31 gauge x 5/16\" ndle USE WITH LANTUS SOLOSTAR 1 Package 3    metFORMIN (GLUCOPHAGE) 500 mg tablet TAKE 1 TABLET BY MOUTH  TWICE DAILY 180 Tab 1    fenofibrate nanocrystallized (TRICOR) 145 mg tablet TAKE 1 TABLET BY MOUTH  DAILY 90 Tab 1    aspirin 81 mg chewable tablet Take 81 mg by mouth daily.       OneTouch Ultra Blue Test Strip strip TEST TWO TIMES DAILY 200 Strip 3  omeprazole (PRILOSEC) 20 mg capsule Take 1 Cap by mouth Daily (before breakfast). 30 Cap 1    Blood-Glucose Meter monitoring kit One touch mini 1 kit 0       Chart and notes reviewed. Data reviewed. I have evaluated and examined the patient.         IMPRESSION:   · Patient doing well      PLAN:/DISCUSION:   · Return to work without restrictions  · Follow-up as needed        Trinity Marie MD

## 2021-05-20 ENCOUNTER — APPOINTMENT (OUTPATIENT)
Dept: FAMILY MEDICINE CLINIC | Age: 36
End: 2021-05-20

## 2021-05-20 DIAGNOSIS — R31.9 HEMATURIA, UNSPECIFIED TYPE: ICD-10-CM

## 2021-05-20 DIAGNOSIS — R53.83 FATIGUE, UNSPECIFIED TYPE: ICD-10-CM

## 2021-05-21 LAB
25(OH)D3 SERPL-MCNC: 28.2 NG/ML (ref 32–100)
ABSOLUTE LYMPHOCYTE COUNT, 10803: 2.9 K/UL (ref 1–4.8)
BASOPHILS # BLD: 0 K/UL (ref 0–0.2)
BASOPHILS NFR BLD: 0 % (ref 0–2)
EOSINOPHIL # BLD: 0.1 K/UL (ref 0–0.5)
EOSINOPHIL NFR BLD: 1 % (ref 0–6)
ERYTHROCYTE [DISTWIDTH] IN BLOOD BY AUTOMATED COUNT: 13.2 % (ref 10–15.5)
GRANULOCYTES,GRANS: 69 % (ref 40–75)
HCT VFR BLD AUTO: 47.3 % (ref 36.6–51.9)
HGB BLD-MCNC: 15.8 G/DL (ref 13.2–17.3)
LYMPHOCYTES, LYMLT: 24 % (ref 20–45)
MCH RBC QN AUTO: 29 PG (ref 26–34)
MCHC RBC AUTO-ENTMCNC: 33 G/DL (ref 31–36)
MCV RBC AUTO: 88 FL (ref 80–95)
MONOCYTES # BLD: 0.6 K/UL (ref 0.1–1)
MONOCYTES NFR BLD: 5 % (ref 3–12)
NEUTROPHILS # BLD AUTO: 8.1 K/UL (ref 1.8–7.7)
PCT FREE PSA,PSA3: 38 %
PLATELET # BLD AUTO: 378 K/UL (ref 140–440)
PMV BLD AUTO: 10.1 FL (ref 9–13)
PSA TOTAL,4108: 0.26 NG/ML
PSA, FREE,PSA1: 0.1 NG/ML
RBC # BLD AUTO: 5.4 M/UL (ref 3.8–5.8)
T3 TOTAL,T3LT: 96 NG/DL (ref 80–200)
T4 FREE SERPL-MCNC: 1.4 NG/DL (ref 0.9–1.8)
TSH SERPL DL<=0.005 MIU/L-ACNC: 2.08 MCU/ML (ref 0.27–4.2)
WBC # BLD AUTO: 11.8 K/UL (ref 4–11)

## 2021-05-28 ENCOUNTER — OFFICE VISIT (OUTPATIENT)
Dept: FAMILY MEDICINE CLINIC | Age: 36
End: 2021-05-28
Payer: COMMERCIAL

## 2021-05-28 VITALS
HEIGHT: 71 IN | OXYGEN SATURATION: 98 % | DIASTOLIC BLOOD PRESSURE: 88 MMHG | WEIGHT: 315 LBS | RESPIRATION RATE: 18 BRPM | HEART RATE: 92 BPM | SYSTOLIC BLOOD PRESSURE: 136 MMHG | TEMPERATURE: 97.2 F | BODY MASS INDEX: 44.1 KG/M2

## 2021-05-28 DIAGNOSIS — E11.65 TYPE 2 DIABETES MELLITUS WITH HYPERGLYCEMIA, WITHOUT LONG-TERM CURRENT USE OF INSULIN (HCC): Primary | ICD-10-CM

## 2021-05-28 DIAGNOSIS — E55.9 HYPOVITAMINOSIS D: ICD-10-CM

## 2021-05-28 DIAGNOSIS — E78.00 PURE HYPERCHOLESTEROLEMIA: ICD-10-CM

## 2021-05-28 DIAGNOSIS — I10 ESSENTIAL HYPERTENSION: ICD-10-CM

## 2021-05-28 PROCEDURE — 99214 OFFICE O/P EST MOD 30 MIN: CPT | Performed by: NURSE PRACTITIONER

## 2021-05-28 RX ORDER — ERGOCALCIFEROL 1.25 MG/1
50000 CAPSULE ORAL
Qty: 4 CAPSULE | Refills: 2 | Status: SHIPPED | OUTPATIENT
Start: 2021-05-28 | End: 2021-12-17 | Stop reason: SDUPTHER

## 2021-05-28 NOTE — PROGRESS NOTES
Subjective:    Katey Thomas is a 38y.o. year old male seen for follow up of diabetes. He also has hypertension and hyperlipidemia. Diabetic Review of Systems - medication compliance: compliant all of the time, diabetic diet compliance: states he has been eating better, comments he has making changes to this diet, home glucose monitoring: is performed regularly, fasting values range 140s, further diabetic ROS: no polyuria or polydipsia, no chest pain, dyspnea or TIA's, no numbness, tingling or pain in extremities, last eye exam approximately over 1 year ago ago. States he purchased a stationary bike and elliptical which is going to begin using. Hypertension ROS: taking medications as instructed, no medication side effects noted, no TIA's, no chest pain on exertion, no dyspnea on exertion, no swelling of ankles. Has been tolerating Exforge well without any adverse effects. States he feels like this medication is working better to help control his blood pressure. Other symptoms and concerns: Patient reports sneezing states he felt a pop in his right groin area. States he is able to to push the area back in. Awaiting return call from surgeon's office. Patient Active Problem List   Diagnosis Code    Motion sickness T75. 3XXA    Kidney stone N20.0    Diabetes mellitus, type 2 ([address] E11.9    Cellulitis, scrotum N49.2    Sepsis ([facility]) A41.9    Cellulitis, perineum L03.315    Hyperglycemia due to type 2 diabetes mellitus ([facility] E11.65    Hypokalemia E87.6    Obesity, morbid ([facility]) E66.01    Type 2 diabetes with nephropathy ([facility]) E11.21    Right inguinal hernia K40.90     Current Outpatient Medications   Medication Sig Dispense Refill    ergocalciferol (ERGOCALCIFEROL) 1,250 mcg (50,000 unit) capsule Take 1 Capsule by mouth every seven (7) days. 4 Capsule 2    tamsulosin (FLOMAX) 0.4 mg capsule Take 1 Cap by mouth daily (after dinner).  90 Cap 3    amLODIPine-valsartan (EXFORGE)  mg per tablet Take 1 Tab by mouth daily. 90 Tab 0    Lantus Solostar U-100 Insulin 100 unit/mL (3 mL) inpn INJECT SUBCUTANEOUSLY 20  UNITS AT NIGHT 30 mL 3    SITagliptin-metFORMIN (JANUMET) 50-1,000 mg per tablet Take 1 Tab by mouth two (2) times daily (with meals). 180 Tab 0    Insulin Needles, Disposable, (BD Ultra-Fine Short Pen Needle) 31 gauge x 5/16\" ndle USE WITH LANTUS SOLOSTAR 1 Package 3    metFORMIN (GLUCOPHAGE) 500 mg tablet TAKE 1 TABLET BY MOUTH  TWICE DAILY 180 Tab 1    fenofibrate nanocrystallized (TRICOR) 145 mg tablet TAKE 1 TABLET BY MOUTH  DAILY 90 Tab 1    aspirin 81 mg chewable tablet Take 81 mg by mouth daily.  OneTouch Ultra Blue Test Strip strip TEST TWO TIMES DAILY 200 Strip 3    omeprazole (PRILOSEC) 20 mg capsule Take 1 Cap by mouth Daily (before breakfast). 30 Cap 1    Blood-Glucose Meter monitoring kit One touch mini 1 kit 0      Allergies   Allergen Reactions    Egtofxukm-Ws-Jdhoqiim-Guaifen Other (comments)     Pt states it causes urinary retention.      Tylenol Cold [Zvr-Miqwsxwrp-Kd-Acetaminophen] Swelling     Past Surgical History:   Procedure Laterality Date    HX KNEE ARTHROSCOPY      HX UROLOGICAL      kidney stone extraction     Family History   Problem Relation Age of Onset    Cancer Father     Hypertension Father     Diabetes Father     Cancer Paternal Uncle     Hypertension Mother       Lab Results   Component Value Date/Time    Cholesterol, total 200 01/27/2021 08:17 AM    HDL Cholesterol 36 (L) 01/27/2021 08:17 AM    LDL,Direct 92 01/11/2019 08:34 AM    LDL, calculated 136 (H) 01/27/2021 08:17 AM    VLDL, calculated 28 01/27/2021 08:17 AM    Triglyceride 141 01/27/2021 08:17 AM     Lab Results   Component Value Date/Time    Sodium 140 02/24/2021 09:19 AM    Potassium 4.4 02/24/2021 09:19 AM    Chloride 99 02/24/2021 09:19 AM    CO2 29 02/24/2021 09:19 AM    Anion gap 12.5 02/24/2021 09:19 AM    Glucose 230 (H) 02/24/2021 09:19 AM    BUN 14 02/24/2021 09:19 AM    Creatinine 0.9 02/24/2021 09:19 AM    BUN/Creatinine ratio 16 03/23/2015 09:17 AM    GFR est AA >60 06/24/2018 06:01 AM    GFR est non-AA >60 06/24/2018 06:01 AM    Calcium 9.7 02/24/2021 09:19 AM    Bilirubin, total 0.9 01/27/2021 08:17 AM    Alk.  phosphatase 78 01/27/2021 08:17 AM    Protein, total 6.9 01/27/2021 08:17 AM    Albumin 4.7 01/27/2021 08:17 AM    Globulin 2.2 01/27/2021 08:17 AM    A-G Ratio 2.1 01/27/2021 08:17 AM    ALT (SGPT) 26 01/27/2021 08:17 AM    AST (SGOT) 16 01/27/2021 08:17 AM     Lab Results   Component Value Date/Time    WBC 11.8 (H) 05/20/2021 08:13 AM    HGB 15.8 05/20/2021 08:13 AM    HCT 47.3 05/20/2021 08:13 AM    PLATELET 245 98/35/1218 08:13 AM    MCV 88 05/20/2021 08:13 AM     Lab Results   Component Value Date/Time    Hemoglobin A1c 9.5 (H) 01/27/2021 08:17 AM    Hemoglobin A1c (POC) 6.3 02/07/2020 08:14 AM    Hemoglobin A1c, External 8.6 12/27/2019 12:00 AM     Lab Results   Component Value Date/Time    Microalb/Creat ratio (ug/mg creat.) 53.1 (H) 01/27/2021 08:17 AM     Wt Readings from Last 3 Encounters:   05/28/21 338 lb (153.3 kg)   05/18/21 340 lb (154.2 kg)   05/17/21 331 lb (150.1 kg)     Last Point of Care HGB A1C  Hemoglobin A1c (POC)   Date Value Ref Range Status   02/07/2020 6.3 % Final      BP Readings from Last 3 Encounters:   05/28/21 136/88   05/18/21 (!) 141/93   05/17/21 (!) 141/90     Results for orders placed or performed in visit on 05/20/21   PSA, TOTAL &  FREE   Result Value Ref Range    PSA Total 0.260 <=1.400 ng/mL    PSA, FREE 0.100 ng/mL    % FREE PSA 38 >25 %   CBC WITH AUTOMATED DIFF   Result Value Ref Range    WBC 11.8 (H) 4.0 - 11.0 K/uL    RBC 5.40 3.80 - 5.80 M/uL    HGB 15.8 13.2 - 17.3 g/dL    HCT 47.3 36.6 - 51.9 %    MCV 88 80 - 95 fL    MCH 29 26 - 34 pg    MCHC 33 31 - 36 g/dL    RDW 13.2 10.0 - 15.5 %    PLATELET 256 654 - 518 K/uL    MPV 10.1 9.0 - 13.0 fL    NEUTROPHILS 69 40 - 75 %    Lymphocytes 24 20 - 45 %    MONOCYTES 5 3 - 12 %    EOSINOPHILS 1 0 - 6 %    BASOPHILS 0 0 - 2 %    ABS. NEUTROPHILS 8.1 (H) 1.8 - 7.7 K/uL    ABSOLUTE LYMPHOCYTE COUNT 2.9 1.0 - 4.8 K/uL    ABS. MONOCYTES 0.6 0.1 - 1.0 K/uL    ABS. EOSINOPHILS 0.1 0.0 - 0.5 K/uL    ABS. BASOPHILS 0.0 0.0 - 0.2 K/uL   T3 TOTAL   Result Value Ref Range    T3, total 96 80 - 200 ng/dL   T4, FREE   Result Value Ref Range    T4, Free 1.4 0.9 - 1.8 ng/dL   TSH 3RD GENERATION   Result Value Ref Range    TSH 2.08 0.27 - 4.20 mcU/mL   VITAMIN D, 25 HYDROXY   Result Value Ref Range    VITAMIN D, 25-HYDROXY 28.2 (L) 32.0 - 100.0 ng/mL         Last Diabetic Foot Exam on:   Diabetic Foot and Eye Exam  Status   Topic Date Due    Eye Exam  02/05/2021    Diabetic Foot Care  05/28/2022         Objective:  Visit Vitals  /88 (BP 1 Location: Right upper arm, BP Patient Position: Sitting, BP Cuff Size: Large adult)   Pulse 92   Temp 97.2 °F (36.2 °C) (Temporal)   Resp 18   Ht 5' 11\" (1.803 m)   Wt 338 lb (153.3 kg)   SpO2 98%   BMI 47.14 kg/m²     Awake and alert in no acute distress   Neck supple without lymphadenopathy, no carotid artery bruits auscultated bilaterally. No thyromegaly   Lungs clear throughout   S1 S2 RRR without ectopy or murmur auscultated. Extremities: no clubbing, cyanosis, peripheral edema   Abdomen - soft, nontender, nondistended, no masses or organomegaly  Integumentary: no rashes   Reviewed vital signs       Diabetic foot exam:     Left Foot:   Visual Exam: normal    Pulse DP: 2+ (normal)   Filament test: normal sensation    Vibratory sensation: normal      Right Foot:   Visual Exam: normal    Pulse DP: 2+ (normal)   Filament test: normal sensation    Vibratory sensation: normal        Assessment/Plan:    ICD-10-CM ICD-9-CM    1. Type 2 diabetes mellitus with hyperglycemia, without long-term current use of insulin (HCC)  E11.65 250.00  DIABETES FOOT EXAM     790.29    2. Essential hypertension  I10 401.9    3.  Pure hypercholesterolemia  E78.00 272.0    4. Hypovitaminosis D  E55.9 268.9      Orders Placed This Encounter    ergocalciferol (ERGOCALCIFEROL) 1,250 mcg (50,000 unit) capsule      needs to follow diet more regularly, above improving  Issues reviewed with him: low cholesterol diet, weight control and daily exercise discussed. I have discussed the diagnosis with the patient and the intended plan as seen in the above orders. The patient has received an after-visit summary and questions were answered concerning future plans. I have discussed medication side effects and warnings with the patient as well. Patient agreeable with above plan and verbalizes understanding. Follow-up and Dispositions    · Return in about 4 months (around 9/28/2021) for DM/HTN/HLD, in office follow up, fasting labs 1 wk prior.

## 2021-06-04 NOTE — TELEPHONE ENCOUNTER
Vicki Simmons is requesting a 90 day supply  Previous Rx was sent to [facility]    Last Visit: 5/28/21 with NP Nicolasa Curtis  Next Appointment: Advised to follow-up in 4 months  Previous Refill Encounter(s): 4/22/21 #90    Requested Prescriptions     Pending Prescriptions Disp Refills    amLODIPine-valsartan (EXFORGE)  mg per tablet 90 Tablet 0     Sig: Take 1 Tablet by mouth daily.

## 2021-06-10 RX ORDER — AMLODIPINE AND VALSARTAN 10; 320 MG/1; MG/1
1 TABLET ORAL DAILY
Qty: 90 TABLET | Refills: 2 | Status: SHIPPED | OUTPATIENT
Start: 2021-06-10 | End: 2021-12-17 | Stop reason: DRUGHIGH

## 2021-06-14 ENCOUNTER — TELEPHONE (OUTPATIENT)
Dept: FAMILY MEDICINE CLINIC | Age: 36
End: 2021-06-14

## 2021-06-14 NOTE — TELEPHONE ENCOUNTER
[facility] is asking for the supervising MD's info on the recent script. Please advise.     [facility] phone #[phone]  Reference #[id number]

## 2021-07-02 ENCOUNTER — VIRTUAL VISIT (OUTPATIENT)
Dept: FAMILY MEDICINE CLINIC | Age: 36
End: 2021-07-02
Payer: COMMERCIAL

## 2021-07-02 DIAGNOSIS — R33.9 INCOMPLETE EMPTYING OF BLADDER: ICD-10-CM

## 2021-07-02 DIAGNOSIS — N35.816 OTHER STRICTURE OF OVERLAPPING SITES OF URETHRA IN MALE: ICD-10-CM

## 2021-07-02 DIAGNOSIS — I10 ESSENTIAL HYPERTENSION: ICD-10-CM

## 2021-07-02 DIAGNOSIS — Z01.818 PRE-OP EXAMINATION: ICD-10-CM

## 2021-07-02 DIAGNOSIS — N40.1 BPH WITH OBSTRUCTION/LOWER URINARY TRACT SYMPTOMS: ICD-10-CM

## 2021-07-02 DIAGNOSIS — N13.8 BPH WITH OBSTRUCTION/LOWER URINARY TRACT SYMPTOMS: ICD-10-CM

## 2021-07-02 DIAGNOSIS — E11.65 TYPE 2 DIABETES MELLITUS WITH HYPERGLYCEMIA, WITHOUT LONG-TERM CURRENT USE OF INSULIN (HCC): Primary | ICD-10-CM

## 2021-07-02 PROCEDURE — 99214 OFFICE O/P EST MOD 30 MIN: CPT | Performed by: NURSE PRACTITIONER

## 2021-07-02 NOTE — PROGRESS NOTES
Amina Chan is a 38 y.o. male who was seen by synchronous (real-time) audio-video technology on 2021 for Pre-op Exam    Preoperative Evaluation    Date of Exam: 2021    Amina Chan is a 38 y.o. male (:1985) who presents for preoperative evaluation. Procedure/Surgery: Urethroplasty  Date of Procedure/Surgery: 2021, possibly 2021  Surgeon: Dr. Nikki Walden: [facility]  Primary Physician: Douglas Alcala NP  Latex Allergy: no    Patient denies any pain. Comments he does have some urinary frequency mostly at night. Comments his worst symptom is urinary hesitnecy and how long it takes him to start his urinary stream.    Problem List:     Patient Active Problem List    Diagnosis Date Noted    Right inguinal hernia 2021    Type 2 diabetes with nephropathy ([address] 2018    Obesity, morbid ([address] 2018    Hypokalemia 2018    Cellulitis, scrotum 2018    Sepsis ([address] 2018    Cellulitis, perineum 2018    Hyperglycemia due to type 2 diabetes mellitus ([address] 2018    Diabetes mellitus, type 2 ([address] 2014    Kidney stone 2012    Motion sickness 2012     Medical History:     Past Medical History:   Diagnosis Date    Diabetes mellitus, type 2 ([address] 2014    GERD (gastroesophageal reflux disease)     Hypertension     Kidney stone 2012    Kidney stones      Allergies: Allergies   Allergen Reactions    Oepzgzsra-Yx-Kfhqxqcn-Guaifen Other (comments)     Pt states it causes urinary retention.  Tylenol Cold [Krw-Ubbhshoiq-Kl-Acetaminophen] Swelling      Medications:     Current Outpatient Medications   Medication Sig    amLODIPine-valsartan (EXFORGE)  mg per tablet Take 1 Tablet by mouth daily.  ergocalciferol (ERGOCALCIFEROL) 1,250 mcg (50,000 unit) capsule Take 1 Capsule by mouth every seven (7) days.     tamsulosin (FLOMAX) 0.4 mg capsule Take 1 Cap by mouth daily (after dinner).  Lantus Solostar U-100 Insulin 100 unit/mL (3 mL) inpn INJECT SUBCUTANEOUSLY 20  UNITS AT NIGHT    SITagliptin-metFORMIN (JANUMET) 50-1,000 mg per tablet Take 1 Tab by mouth two (2) times daily (with meals).  Insulin Needles, Disposable, (BD Ultra-Fine Short Pen Needle) 31 gauge x 5/16\" ndle USE WITH LANTUS SOLOSTAR    metFORMIN (GLUCOPHAGE) 500 mg tablet TAKE 1 TABLET BY MOUTH  TWICE DAILY    fenofibrate nanocrystallized (TRICOR) 145 mg tablet TAKE 1 TABLET BY MOUTH  DAILY    aspirin 81 mg chewable tablet Take 81 mg by mouth daily.  OneTouch Ultra Blue Test Strip strip TEST TWO TIMES DAILY    omeprazole (PRILOSEC) 20 mg capsule Take 1 Cap by mouth Daily (before breakfast).  Blood-Glucose Meter monitoring kit One touch mini     No current facility-administered medications for this visit. Surgical History:     Past Surgical History:   Procedure Laterality Date    HX KNEE ARTHROSCOPY      HX UROLOGICAL      kidney stone extraction     Social History:     Social History     Socioeconomic History    Marital status:      Spouse name: Not on file    Number of children: Not on file    Years of education: Not on file    Highest education level: Not on file   Tobacco Use    Smoking status: Never Smoker    Smokeless tobacco: Never Used   Vaping Use    Vaping Use: Never used   Substance and Sexual Activity    Alcohol use: Yes     Comment: rare    Drug use: Never     Social Determinants of Health     Financial Resource Strain:     Difficulty of Paying Living Expenses:    Food Insecurity:     Worried About Running Out of Food in the Last Year:     [address] in the Last Year:    Transportation Needs:     Lack of Transportation (Medical):      Lack of Transportation (Non-Medical):    Physical Activity:     Days of Exercise per Week:     Minutes of Exercise per Session:    Stress:     Feeling of Stress :    Social Connections:     Frequency of Communication with Friends and Family:     Frequency of Social Gatherings with Friends and Family:     Attends Buddhism Services:     Active Member of Clubs or Organizations:     Attends Club or Organization Meetings:     Marital Status:        Recent use of: No recent use of aspirin (ASA), NSAIDS or steroids    Tetanus up to date: last tetanus booster 11/22/2014      Anesthesia Complications: None  History of abnormal bleeding : None  History of Blood Transfusions: no  Health Care Directive or Living Will: no    REVIEW OF SYSTEMS:  Review of Systems   Constitutional: Negative for activity change, appetite change, chills, fatigue and fever. Respiratory: Negative for chest tightness and shortness of breath. Cardiovascular: Negative for chest pain and leg swelling. Gastrointestinal: Negative for abdominal pain. Genitourinary: Positive for difficulty urinating (due to stricture), frequency (mostly at night) and urgency. Negative for discharge, penile pain, penile swelling and scrotal swelling. Neurological: Negative for dizziness and headaches. Objective:   No flowsheet data found.    General: alert, cooperative, no distress   Mental  status: normal mood, behavior, speech, dress, motor activity, and thought processes, able to follow commands   HENT: NCAT   Neck: no visualized mass   Resp: no respiratory distress   Neuro: no gross deficits   Skin: no discoloration or lesions of concern on visible areas   Psychiatric: normal affect, consistent with stated mood, no evidence of hallucinations         DIAGNOSTICS:   Office Visit on 06/17/2021   Component Date Value Ref Range Status    PVR 06/17/2021 0  cc Final    Color (UA POC) 06/17/2021 Yellow   Final    Clarity (UA POC) 06/17/2021 Clear   Final    Glucose (UA POC) 06/17/2021 Trace  Negative Final    Bilirubin (UA POC) 06/17/2021 Negative  Negative Final    Ketones (UA POC) 06/17/2021 Negative  Negative Final    Specific gravity (UA POC) 06/17/2021 1.025 1.001 - 1.035 Final    Blood (UA POC) 06/17/2021 Trace  Negative Final    pH (UA POC) 06/17/2021 6.0  4.6 - 8.0 Final    Protein (UA POC) 06/17/2021 Negative  Negative Final    Urobilinogen (UA POC) 06/17/2021 0.2 mg/dL  0.2 - 1 Final    Nitrites (UA POC) 06/17/2021 Negative  Negative Final    Leukocyte esterase (UA POC) 06/17/2021 1+  Negative Final    VOIDING TIME 06/17/2021 61.0  Seconds Final    FLOW TIME 06/17/2021 59.7  Seconds Final    TIME TO MAX 06/17/2021 10.8  Seconds Final    MAX FLOW RATE 06/17/2021 4.2  ml/Seconds Final    AVG FLOW RATE 06/17/2021 2.6  ml/Seconds Final    VOIDED VOLUME 06/17/2021 158  ml Final    RESULT 06/17/2021 Normal   Final    Comment: Updated: [id number]  LAB ACC#: [id number]  SOURCE: Clean Catch Urine  --FINAL REPORT--  No Growth        Hemoglobin A1c 06/29/2021 5.9* 4.8 - 5.6 % Final    AVG GLU 06/29/2021 122  91 - 123 mg/dL Final    Comment: 5.7 - 6.4% is indicative of prediabetes. > 6.4% is indicative of diabetes. Appointment on 05/20/2021   Component Date Value Ref Range Status    PSA Total 05/20/2021 0.260  <=1.400 ng/mL Final    PSA, FREE 05/20/2021 0.100  ng/mL Final    % FREE PSA 05/20/2021 38  >25 % Final    Comment: With a Free to Total PSA ratio >25% the individual's risk of prostate cancer   is  approximately 8%.           WBC 05/20/2021 11.8* 4.0 - 11.0 K/uL Final    RBC 05/20/2021 5.40  3.80 - 5.80 M/uL Final    HGB 05/20/2021 15.8  13.2 - 17.3 g/dL Final    HCT 05/20/2021 47.3  36.6 - 51.9 % Final    MCV 05/20/2021 88  80 - 95 fL Final    MCH 05/20/2021 29  26 - 34 pg Final    MCHC 05/20/2021 33  31 - 36 g/dL Final    RDW 05/20/2021 13.2  10.0 - 15.5 % Final    PLATELET 38/69/0256 289  140 - 440 K/uL Final    MPV 05/20/2021 10.1  9.0 - 13.0 fL Final    NEUTROPHILS 05/20/2021 69  40 - 75 % Final    Lymphocytes 05/20/2021 24  20 - 45 % Final    MONOCYTES 05/20/2021 5  3 - 12 % Final    EOSINOPHILS 05/20/2021 1  0 - 6 % Final    BASOPHILS 05/20/2021 0  0 - 2 % Final    ABS. NEUTROPHILS 05/20/2021 8.1* 1.8 - 7.7 K/uL Final    ABSOLUTE LYMPHOCYTE COUNT 05/20/2021 2.9  1.0 - 4.8 K/uL Final    ABS. MONOCYTES 05/20/2021 0.6  0.1 - 1.0 K/uL Final    ABS. EOSINOPHILS 05/20/2021 0.1  0.0 - 0.5 K/uL Final    ABS. BASOPHILS 05/20/2021 0.0  0.0 - 0.2 K/uL Final    T3, total 05/20/2021 96  80 - 200 ng/dL Final    T4, Free 05/20/2021 1.4  0.9 - 1.8 ng/dL Final    TSH 05/20/2021 2.08  0.27 - 4.20 mcU/mL Final    VITAMIN D, 25-HYDROXY 05/20/2021 28.2* 32.0 - 100.0 ng/mL Final   Office Visit on 05/13/2021   Component Date Value Ref Range Status    Color (UA POC) 05/13/2021 Yellow   Final    Clarity (UA POC) 05/13/2021 Clear   Final    Glucose (UA POC) 05/13/2021 Negative  Negative Final    Bilirubin (UA POC) 05/13/2021 1+  Negative Final    Ketones (UA POC) 05/13/2021 Trace  Negative Final    Specific gravity (UA POC) 05/13/2021 1.030  1.001 - 1.035 Final    Blood (UA POC) 05/13/2021 Trace  Negative Final    pH (UA POC) 05/13/2021 5.5  4.6 - 8.0 Final    Protein (UA POC) 05/13/2021 2+  Negative Final    Urobilinogen (UA POC) 05/13/2021 0.2 mg/dL  0.2 - 1 Final    Nitrites (UA POC) 05/13/2021 Negative  Negative Final    Leukocyte esterase (UA POC) 05/13/2021 Trace  Negative Final    PVR 05/13/2021 28  cc Final       IMPRESSION:     ICD-10-CM ICD-9-CM    1. Type 2 diabetes mellitus with hyperglycemia, without long-term current use of insulin (HCC)  E11.65 250.00      790.29    2. Essential hypertension  I10 401.9    3. Pre-op examination  Z01.818 V72.84    4. Incomplete emptying of bladder  R33.9 788.21    5. 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  BPH with obstruction/lower urinary tract symptoms  N40.1 600.01     N13.8 599.69      No contraindications to planned surgery    Johnny Novoa NP   7/2/2021        Ml Mcmanus MD    We discussed the expected course, resolution and complications of the diagnosis(es) in detail. Medication risks, benefits, costs, interactions, and alternatives were discussed as indicated. I advised him to contact the office if his condition worsens, changes or fails to improve as anticipated. He expressed understanding with the diagnosis(es) and plan. Jacob Wiseman, was evaluated through a synchronous (real-time) audio-video encounter. The patient (or guardian if applicable) is aware that this is a billable service. Verbal consent to proceed has been obtained within the past 12 months. The visit was conducted pursuant to the emergency declaration under the [address] [place] [address], [address] [place] [address] authority and the [employer] General Act. Patient identification was verified, and a caregiver was present when appropriate. The patient was located in a state where the provider was credentialed to provide care.     Shari Wesley NP

## 2021-07-12 DIAGNOSIS — E11.65 TYPE 2 DIABETES MELLITUS WITH HYPERGLYCEMIA, WITH LONG-TERM CURRENT USE OF INSULIN (HCC): ICD-10-CM

## 2021-07-12 DIAGNOSIS — E11.65 TYPE 2 DIABETES MELLITUS WITH HYPERGLYCEMIA, WITHOUT LONG-TERM CURRENT USE OF INSULIN (HCC): ICD-10-CM

## 2021-07-12 DIAGNOSIS — Z79.4 TYPE 2 DIABETES MELLITUS WITH HYPERGLYCEMIA, WITH LONG-TERM CURRENT USE OF INSULIN (HCC): ICD-10-CM

## 2021-07-12 NOTE — TELEPHONE ENCOUNTER
Last Visit: 7/2/21 with NP Nathalie Benavides  Next Appointment: none  Previous Refill Encounter(s): 4/1/21 #180    Requested Prescriptions     Pending Prescriptions Disp Refills    SITagliptin-metFORMIN (JANUMET) 50-1,000 mg per tablet 180 Tablet 1     Sig: Take 1 Tablet by mouth two (2) times daily (with meals).

## 2021-07-19 RX ORDER — PEN NEEDLE, DIABETIC 30 GX3/16"
NEEDLE, DISPOSABLE MISCELLANEOUS
Qty: 1 PACKAGE | Refills: 5 | Status: SHIPPED | OUTPATIENT
Start: 2021-07-19 | End: 2022-09-13

## 2021-09-02 ENCOUNTER — VIRTUAL VISIT (OUTPATIENT)
Dept: FAMILY MEDICINE CLINIC | Age: 36
End: 2021-09-02
Payer: COMMERCIAL

## 2021-09-02 DIAGNOSIS — J12.82 PNEUMONIA DUE TO COVID-19 VIRUS: Primary | ICD-10-CM

## 2021-09-02 DIAGNOSIS — U07.1 PNEUMONIA DUE TO COVID-19 VIRUS: Primary | ICD-10-CM

## 2021-09-02 DIAGNOSIS — Z09 HOSPITAL DISCHARGE FOLLOW-UP: ICD-10-CM

## 2021-09-02 PROCEDURE — 99213 OFFICE O/P EST LOW 20 MIN: CPT | Performed by: NURSE PRACTITIONER

## 2021-09-02 NOTE — PROGRESS NOTES
Dalia Puri is a 38 y.o. male who was seen by synchronous (real-time) audio-video technology on 9/2/2021 for No chief complaint on file. Assessment & Plan:     Diagnoses and all orders for this visit:    1. Pneumonia due to COVID-19 virus  -     XR CHEST PA LAT; Future    2. Hospital discharge follow-up      Instructed to increase lantus to 25 units until glucose readings are consistently less 150. Follow-up and Dispositions    · Return in about 2 weeks (around 9/16/2021) for DM. I spent at least 20 minutes on this visit with this established patient. 712  Subjective:   Patient states he was dx with COVID on 8/1/2021. Comments he was dx with COVID pneumonia and admitted to [place] [address] 8/1/2021-8/5/2021  Patient states he glucose readings were in the 200s-300s with taking prednisone. Patient states his glucose readings are decreasing however, continue to be in high 100s-low 200s for the last week. Reports he does continue to get sob and also coughing with talking. Prior to Admission medications    Medication Sig Start Date End Date Taking? Authorizing Provider   ciprofloxacin HCl (CIPRO) 500 mg tablet Take 1 Tablet by mouth two (2) times a day for 5 days. 8/27/21 9/1/21  Daniela Shannon MD   trospium (SANCTURA) 20 mg tablet Take 1 Tablet by mouth two (2) times a day. 8/9/21   Leon Patel MD   Insulin Needles, Disposable, (BD Ultra-Fine Short Pen Needle) 31 gauge x 5/16\" ndle USE WITH LANTUS SOLOSTAR 7/19/21   Iman SKINNER NP   glucose blood VI test strips (OneTouch Ultra Blue Test Strip) strip Check glucose 2 times daily 7/19/21   Iman SKINNER NP   SITagliptin-metFORMIN (JANUMET) 50-1,000 mg per tablet Take 1 Tablet by mouth two (2) times daily (with meals). 7/12/21   Iman SKINNER NP   amLODIPine-valsartan (EXFORGE)  mg per tablet Take 1 Tablet by mouth daily.  6/10/21   Iman SKINNER NP   ergocalciferol (ERGOCALCIFEROL) 1,250 mcg (50,000 unit) capsule Take 1 Capsule by mouth every seven (7) days. 5/28/21   Teresa SKINNER NP   tamsulosin (FLOMAX) 0.4 mg capsule Take 1 Cap by mouth daily (after dinner). 5/13/21   OTF Dwyer U-100 Insulin 100 unit/mL (3 mL) inpn INJECT SUBCUTANEOUSLY 20  UNITS AT NIGHT 4/7/21   Teresa SKINNER NP   fenofibrate nanocrystallized (TRICOR) 145 mg tablet TAKE 1 TABLET BY MOUTH  DAILY 3/6/21   Teresa SKINNER NP   aspirin 81 mg chewable tablet Take 81 mg by mouth daily. Provider, Historical   omeprazole (PRILOSEC) 20 mg capsule Take 1 Cap by mouth Daily (before breakfast). 3/11/19   Teresa SKINNER NP   Blood-Glucose Meter monitoring kit One touch mini 9/10/14   Maryuri Justin NP     Patient Active Problem List   Diagnosis Code    Motion sickness T75. 3XXA    Kidney stone N20.0    Diabetes mellitus, type 2 ([address] E11.9    Cellulitis, scrotum N49.2    Sepsis ([facility]) A41.9    Cellulitis, perineum L03.315    Hyperglycemia due to type 2 diabetes mellitus ([address] E11.65    Hypokalemia E87.6    Obesity, morbid ([facility]) E66.01    Type 2 diabetes with nephropathy ([facility]) E11.21    Right inguinal hernia K40.90     Patient Active Problem List    Diagnosis Date Noted    Right inguinal hernia 03/01/2021    Type 2 diabetes with nephropathy ([address] 07/16/2018    Obesity, morbid ([address] 07/09/2018    Hypokalemia 06/18/2018    Cellulitis, scrotum 06/14/2018    Sepsis ([address] 06/14/2018    Cellulitis, perineum 06/14/2018    Hyperglycemia due to type 2 diabetes mellitus ([address] 06/14/2018    Diabetes mellitus, type 2 ([address] 09/25/2014    Kidney stone 09/26/2012    Motion sickness 09/25/2012     Current Outpatient Medications   Medication Sig Dispense Refill    trospium (SANCTURA) 20 mg tablet Take 1 Tablet by mouth two (2) times a day.  60 Tablet 11    Insulin Needles, Disposable, (BD Ultra-Fine Short Pen Needle) 31 gauge x 5/16\" ndle USE WITH LANTUS SOLOSTAR 1 Package 5    glucose blood VI test strips (OneTouch Ultra Blue Test Strip) strip Check glucose 2 times daily 200 Strip 3    SITagliptin-metFORMIN (JANUMET) 50-1,000 mg per tablet Take 1 Tablet by mouth two (2) times daily (with meals). 180 Tablet 1    amLODIPine-valsartan (EXFORGE)  mg per tablet Take 1 Tablet by mouth daily. 90 Tablet 2    ergocalciferol (ERGOCALCIFEROL) 1,250 mcg (50,000 unit) capsule Take 1 Capsule by mouth every seven (7) days. 4 Capsule 2    tamsulosin (FLOMAX) 0.4 mg capsule Take 1 Cap by mouth daily (after dinner). 90 Cap 3    Lantus Solostar U-100 Insulin 100 unit/mL (3 mL) inpn INJECT SUBCUTANEOUSLY 20  UNITS AT NIGHT 30 mL 3    fenofibrate nanocrystallized (TRICOR) 145 mg tablet TAKE 1 TABLET BY MOUTH  DAILY 90 Tab 1    aspirin 81 mg chewable tablet Take 81 mg by mouth daily.  omeprazole (PRILOSEC) 20 mg capsule Take 1 Cap by mouth Daily (before breakfast). 30 Cap 1    Blood-Glucose Meter monitoring kit One touch mini 1 kit 0     Allergies   Allergen Reactions    Vwamfgcrz-Fw-Zjniytso-Guaifen Other (comments)     Pt states it causes urinary retention.  Tylenol Cold [Gyj-Tgubsmpdi-Bl-Acetaminophen] Swelling     Past Medical History:   Diagnosis Date    [facility] 2 [facility] 9/25/2014    GERD (gastroesophageal reflux disease)     Hypertension     Kidney stone 9/26/2012    Kidney stones      Past Surgical History:   Procedure Laterality Date    HX KNEE ARTHROSCOPY      HX UROLOGICAL      kidney stone extraction     Family History   Problem Relation Age of Onset    Cancer Father     Hypertension Father     Diabetes Father     Cancer Paternal Uncle     Hypertension Mother      Social History     Tobacco Use    Smoking status: Never Smoker    Smokeless tobacco: Never Used   Substance Use Topics    Alcohol use: Yes     Comment: rare     ROS    Objective:   No flowsheet data found.    General: alert, cooperative, no distress   Mental  status: normal mood, behavior, speech, dress, motor activity, and thought processes, able to follow commands   HENT: NCAT   Neck: no visualized mass   Resp: no respiratory distress   Neuro: no gross deficits   Skin: no discoloration or lesions of concern on visible areas   Psychiatric: normal affect, consistent with stated mood, no evidence of hallucinations     Additional exam findings: We discussed the expected course, resolution and complications of the diagnosis(es) in detail. Medication risks, benefits, costs, interactions, and alternatives were discussed as indicated. I advised him to contact the office if his condition worsens, changes or fails to improve as anticipated. He expressed understanding with the diagnosis(es) and plan. Sherwin Ambrocio, was evaluated through a synchronous (real-time) audio-video encounter. The patient (or guardian if applicable) is aware that this is a billable service. Verbal consent to proceed has been obtained within the past 12 months. The visit was conducted pursuant to the emergency declaration under the [address] [place] [address] authority and the [employer] General Act. Patient identification was verified, and a caregiver was present when appropriate. The patient was located in a state where the provider was credentialed to provide care.     Lorena Doyle NP

## 2021-09-07 RX ORDER — FENOFIBRATE 145 MG/1
TABLET, COATED ORAL
Qty: 90 TABLET | Refills: 3 | Status: SHIPPED | OUTPATIENT
Start: 2021-09-07 | End: 2022-08-24

## 2021-09-08 ENCOUNTER — HOSPITAL ENCOUNTER (OUTPATIENT)
Dept: GENERAL RADIOLOGY | Age: 36
Discharge: HOME | End: 2021-09-08
Payer: COMMERCIAL

## 2021-09-08 DIAGNOSIS — U07.1 PNEUMONIA DUE TO COVID-19 VIRUS: ICD-10-CM

## 2021-09-08 DIAGNOSIS — J12.82 PNEUMONIA DUE TO COVID-19 VIRUS: ICD-10-CM

## 2021-09-08 PROCEDURE — 71046 X-RAY EXAM CHEST 2 VIEWS: CPT

## 2021-10-13 ENCOUNTER — VIRTUAL VISIT (OUTPATIENT)
Dept: FAMILY MEDICINE CLINIC | Age: 36
End: 2021-10-13
Payer: COMMERCIAL

## 2021-10-13 DIAGNOSIS — E78.00 PURE HYPERCHOLESTEROLEMIA: ICD-10-CM

## 2021-10-13 DIAGNOSIS — E11.65 TYPE 2 DIABETES MELLITUS WITH HYPERGLYCEMIA, WITHOUT LONG-TERM CURRENT USE OF INSULIN (HCC): Primary | ICD-10-CM

## 2021-10-13 DIAGNOSIS — I10 ESSENTIAL HYPERTENSION: ICD-10-CM

## 2021-10-13 DIAGNOSIS — E55.9 HYPOVITAMINOSIS D: ICD-10-CM

## 2021-10-13 PROCEDURE — 99212 OFFICE O/P EST SF 10 MIN: CPT | Performed by: NURSE PRACTITIONER

## 2021-10-13 NOTE — PROGRESS NOTES
Salo Leblanc is a 39 y.o. male who was seen by synchronous (real-time) audio-video technology on 10/13/2021 for Diabetes    Assessment & Plan:     Diagnoses and all orders for this visit:    1. Type 2 diabetes mellitus with hyperglycemia, without long-term current use of insulin (HCC)  -     HEMOGLOBIN A1C WITH EAG; Future  -     LIPID PANEL; Future  -     METABOLIC PANEL, COMPREHENSIVE; Future  -     MICROALBUMIN, UR, RAND W/ MICROALB/CREAT RATIO; Future  -     CBC WITH AUTOMATED DIFF; Future    2. Essential hypertension  -     LIPID PANEL; Future  -     METABOLIC PANEL, COMPREHENSIVE; Future    3. Pure hypercholesterolemia  -     LIPID PANEL; Future  -     METABOLIC PANEL, COMPREHENSIVE; Future    4. Hypovitaminosis D  -     VITAMIN D, 25 HYDROXY; Future      Follow-up and Dispositions    · Return in about 2 months (around 12/13/2021) for DM/HTN/HLD, fasting labs 1 wk prior, in office follow up. Routing History        I spent at least 10 minutes on this visit with this established patient. 712  Subjective:   Patient states his glucose is now 120-130s fasting. States he was able to resume his previous dosage of lantus 20 units 2 weeks ago. Comments breathing is a lot better. Prior to Admission medications    Medication Sig Start Date End Date Taking? Authorizing Provider   fenofibrate nanocrystallized (TRICOR) 145 mg tablet TAKE 1 TABLET BY MOUTH  DAILY 9/7/21   Guanakito SKINNER NP   trospium (SANCTURA) 20 mg tablet Take 1 Tablet by mouth two (2) times a day. 8/9/21   Minor Patel MD   Insulin Needles, Disposable, (BD Ultra-Fine Short Pen Needle) 31 gauge x 5/16\" ndle USE WITH LANTUS SOLOSTAR 7/19/21   Guanakito SKINNER NP   glucose blood VI test strips (OneTouch Ultra Blue Test Strip) strip Check glucose 2 times daily 7/19/21   Guanakito SKINNER NP   SITagliptin-metFORMIN (JANUMET) 50-1,000 mg per tablet Take 1 Tablet by mouth two (2) times daily (with meals).  7/12/21   Zak Craft NP amLODIPine-valsartan (EXFORGE)  mg per tablet Take 1 Tablet by mouth daily. 6/10/21   Samanta SKINNER NP   ergocalciferol (ERGOCALCIFEROL) 1,250 mcg (50,000 unit) capsule Take 1 Capsule by mouth every seven (7) days. 5/28/21   Samanta SKINNER NP   tamsulosin (FLOMAX) 0.4 mg capsule Take 1 Cap by mouth daily (after dinner). 5/13/21   Terese Napier, PA   Lantus Solostar U-100 Insulin 100 unit/mL (3 mL) inpn INJECT SUBCUTANEOUSLY 20  UNITS AT NIGHT 4/7/21   Samanta SKINNER NP   aspirin 81 mg chewable tablet Take 81 mg by mouth daily. Provider, Guido   omeprazole (PRILOSEC) 20 mg capsule Take 1 Cap by mouth Daily (before breakfast). 3/11/19   Samanta SKINNER NP   Blood-Glucose Meter monitoring kit One touch mini 9/10/14   Yancy Banuelos NP     Patient Active Problem List   Diagnosis Code    Motion sickness T75. 3XXA    Kidney stone N20.0    Diabetes mellitus, type 2 ([address] E11.9    Cellulitis, scrotum N49.2    Sepsis (HCC) A41.9    Cellulitis, perineum L03.315    Hyperglycemia due to type 2 diabetes mellitus ([address] E11.65    Hypokalemia E87.6    Obesity, morbid ([facility] E66.01    Type 2 diabetes with nephropathy ([facility]) E11.21    Right inguinal hernia K40.90     Patient Active Problem List    Diagnosis Date Noted    Right inguinal hernia 03/01/2021    Type 2 diabetes with nephropathy ([address] 07/16/2018    Obesity, morbid ([address] 07/09/2018    Hypokalemia 06/18/2018    Cellulitis, scrotum 06/14/2018    Sepsis ([address] 06/14/2018    Cellulitis, perineum 06/14/2018    Hyperglycemia due to type 2 diabetes mellitus ([address] 06/14/2018    Diabetes mellitus, type 2 ([address] 09/25/2014    Kidney stone 09/26/2012    Motion sickness 09/25/2012     Current Outpatient Medications   Medication Sig Dispense Refill    fenofibrate nanocrystallized (TRICOR) 145 mg tablet TAKE 1 TABLET BY MOUTH  DAILY 90 Tablet 3    trospium (SANCTURA) 20 mg tablet Take 1 Tablet by mouth two (2) times a day.  60 Tablet 11    Insulin Needles, Disposable, (BD Ultra-Fine Short Pen Needle) 31 gauge x 5/16\" ndle USE WITH LANTUS SOLOSTAR 1 Package 5    glucose blood VI test strips (OneTouch Ultra Blue Test Strip) strip Check glucose 2 times daily 200 Strip 3    SITagliptin-metFORMIN (JANUMET) 50-1,000 mg per tablet Take 1 Tablet by mouth two (2) times daily (with meals). 180 Tablet 1    amLODIPine-valsartan (EXFORGE)  mg per tablet Take 1 Tablet by mouth daily. 90 Tablet 2    ergocalciferol (ERGOCALCIFEROL) 1,250 mcg (50,000 unit) capsule Take 1 Capsule by mouth every seven (7) days. 4 Capsule 2    tamsulosin (FLOMAX) 0.4 mg capsule Take 1 Cap by mouth daily (after dinner). 90 Cap 3    Lantus Solostar U-100 Insulin 100 unit/mL (3 mL) inpn INJECT SUBCUTANEOUSLY 20  UNITS AT NIGHT 30 mL 3    aspirin 81 mg chewable tablet Take 81 mg by mouth daily.  omeprazole (PRILOSEC) 20 mg capsule Take 1 Cap by mouth Daily (before breakfast). 30 Cap 1    Blood-Glucose Meter monitoring kit One touch mini 1 kit 0     Allergies   Allergen Reactions    Bkyiqboxr-Fe-Vdjaefwi-Guaifen Other (comments)     Pt states it causes urinary retention.  Tylenol Cold [Bvh-Mqvwzvsyz-Nh-Acetaminophen] Swelling     Past Medical History:   Diagnosis Date    Diabetes mellitus, type 2 ([address] 9/25/2014    GERD (gastroesophageal reflux disease)     Hypertension     Kidney stone 9/26/2012    Kidney stones      Past Surgical History:   Procedure Laterality Date    HX KNEE ARTHROSCOPY      HX UROLOGICAL      kidney stone extraction     Family History   Problem Relation Age of Onset    Cancer Father     Hypertension Father     Diabetes Father     Cancer Paternal Uncle     Hypertension Mother      Social History     Tobacco Use    Smoking status: Never Smoker    Smokeless tobacco: Never Used   Substance Use Topics    Alcohol use: Yes     Comment: rare       ROS    Objective:   No flowsheet data found.    General: alert, cooperative, no distress Mental  status: normal mood, behavior, speech, dress, motor activity, and thought processes, able to follow commands   HENT: NCAT   Neck: no visualized mass   Resp: no respiratory distress   Neuro: no gross deficits   Skin: no discoloration or lesions of concern on visible areas   Psychiatric: normal affect, consistent with stated mood, no evidence of hallucinations     Additional exam findings: We discussed the expected course, resolution and complications of the diagnosis(es) in detail. Medication risks, benefits, costs, interactions, and alternatives were discussed as indicated. I advised him to contact the office if his condition worsens, changes or fails to improve as anticipated. He expressed understanding with the diagnosis(es) and plan. Neno Rosales, was evaluated through a synchronous (real-time) audio-video encounter. The patient (or guardian if applicable) is aware that this is a billable service. Verbal consent to proceed has been obtained within the past 12 months. The visit was conducted pursuant to the emergency declaration under the [address] [place] [address] authority and the [employer] General Act. Patient identification was verified, and a caregiver was present when appropriate. The patient was located in a state where the provider was credentialed to provide care.     Priscila Mcelroy NP

## 2021-10-13 NOTE — PATIENT INSTRUCTIONS
[address] [place] [address] LAB - Epic Page 1 of 1   Client Code:  [id number]         Req/Control #: [id number]       Collection Date:   Bill Insurance Unless Marked - [id number]       Collection Time:   Client Bill [ ] - [id number]       Collected By:    Self Pay [ ] - [id number]              Client / Ordering Site Information: Physician Information:   Location: [place] ([id number])   Address: [address] [place] [address] Zip: [place] [address],[place] [address]   Phone (Fax): [phone] ([phone])    Ordering: Kiran Jun: NP   NPI: [id number]   UPIN: Not on file   Physician ID:           Patient Information:   Name: Etelvina Dooley   Gender: Male   YOB: 1985   Age: 39 years   Address: [address] [place] [address] Zip: [address] FUENTESN: [id number]   Patient ID: [id number]   Phone: [phone]       Alt Control#: [id number]   Alt Patient ID:              CPT CODE TESTS ORDERED (Total: 6) PRIORITY ORDERED EXPECTED EXPIRES TUBE CAP          [ ] BCF - Add Blood Collection Fee        [id number] VITAMIN D, 25 HYDROXY Routine 10/13/2021 11/13/2021 10/14/2022 Not on File   [id number] HEMOGLOBIN A1C WITH EAG Routine 10/13/2021 11/13/2021 10/14/2022 Not on File   [id number] 03789 LIPID PANEL Routine 10/13/2021 11/13/2021 10/14/2022 Not on File   [id number] METABOLIC PANEL, COMPREHENSIVE Routine 10/13/2021 11/13/2021 10/14/2022 Not on File   99343 72823 MICROALBUMIN, UR, RAND W/ MICROALB/CREAT RATIO Routine 10/13/2021 11/13/2021 10/14/2022 Not on File   67049 100 CBC WITH AUTOMATED DIFF Routine 10/13/2021 11/13/2021 10/14/2022 Not on File         Comments:         Specimen Source:   ([id number]) MICROALBUMIN, UR, RAND W/ MICROALB/CREAT RATIO:   Urine            Diagnosis Codes:   E11.65 E55.9 I10 E78.00           Bill Type:      Ins Code: Not on file         Responsible Party / Guarantor Information:   Name: Etelvina Soumya   Address: [address] [place] [address] Zip: [place], [place] [address]   Phone: [phone]   Relation to Pt: Self   Employer Name: [employer]         ABN:      Worker's Comp: N Date of Injury:              Insurance Information:   Primary Insurance: Secondary Insurance:   Ins Co Name: "[employer] O   Address 1: [place] [address]   Address 2[facility] [address]   Policy Number: [id number]   Group #: [id number]    Ins Co Name:     Address 1:     Address 2:     [place], [place] Zip:     Policy Number:     Group #:        Primary Policy [place] / Insured: [address] / Insured:   Name: Isamar Chaney   Address: [address] [facility], [address] [facility]    Pt Relation to Subscriber: Self    Name:     Address:          Pt Relation to Subscriber:           Electronically Signed By:   [id number]  Khadijah [address]        NAYELI YU   1985 [id number]    Isamar Chaney   1985 [id number]    NAYELI YU   1985 [id number]    NAYELI YU   1985 [id number]      NAYELI UY   1985 [id number]    NAYELI YU   1985 [id number]    NAYELI YU   1985 [id number]    NAYELI YU   [id number]

## 2021-12-13 ENCOUNTER — APPOINTMENT (OUTPATIENT)
Dept: FAMILY MEDICINE CLINIC | Age: 36
End: 2021-12-13

## 2021-12-14 LAB
25(OH)D3 SERPL-MCNC: 25.1 NG/ML (ref 32–100)
A-G RATIO,AGRAT: 2 RATIO (ref 1.1–2.6)
ABSOLUTE LYMPHOCYTE COUNT, 10803: 2.5 K/UL (ref 1–4.8)
ALBUMIN SERPL-MCNC: 4.5 G/DL (ref 3.5–5)
ALP SERPL-CCNC: 64 U/L (ref 25–115)
ALT SERPL-CCNC: 19 U/L (ref 5–40)
ANION GAP SERPL CALC-SCNC: 12 MMOL/L (ref 3–15)
AST SERPL W P-5'-P-CCNC: 16 U/L (ref 10–37)
AVG GLU, 10930: 140 MG/DL (ref 91–123)
BASOPHILS # BLD: 0 K/UL (ref 0–0.2)
BASOPHILS NFR BLD: 0 % (ref 0–2)
BILIRUB SERPL-MCNC: 0.4 MG/DL (ref 0.2–1.2)
BUN SERPL-MCNC: 12 MG/DL (ref 6–22)
CALCIUM SERPL-MCNC: 9.5 MG/DL (ref 8.4–10.5)
CHLORIDE SERPL-SCNC: 103 MMOL/L (ref 98–110)
CHOLEST SERPL-MCNC: 193 MG/DL (ref 110–200)
CO2 SERPL-SCNC: 26 MMOL/L (ref 20–32)
CREAT SERPL-MCNC: 0.9 MG/DL (ref 0.5–1.2)
CREATININE, URINE: 271 MG/DL
EOSINOPHIL # BLD: 0.2 K/UL (ref 0–0.5)
EOSINOPHIL NFR BLD: 2 % (ref 0–6)
ERYTHROCYTE [DISTWIDTH] IN BLOOD BY AUTOMATED COUNT: 13.4 % (ref 10–15.5)
GFRAA, 66117: >60
GFRNA, 66118: >60
GLOBULIN,GLOB: 2.2 G/DL (ref 2–4)
GLUCOSE SERPL-MCNC: 121 MG/DL (ref 70–99)
GRANULOCYTES,GRANS: 65 % (ref 40–75)
HBA1C MFR BLD HPLC: 6.5 % (ref 4.8–5.6)
HCT VFR BLD AUTO: 48.9 % (ref 36.6–51.9)
HDLC SERPL-MCNC: 4.4 MG/DL (ref 0–5)
HDLC SERPL-MCNC: 44 MG/DL
HGB BLD-MCNC: 15.3 G/DL (ref 13.2–17.3)
LDL/HDL RATIO,LDHD: 3
LDLC SERPL CALC-MCNC: 132 MG/DL (ref 50–99)
LYMPHOCYTES, LYMLT: 27 % (ref 20–45)
MCH RBC QN AUTO: 28 PG (ref 26–34)
MCHC RBC AUTO-ENTMCNC: 31 G/DL (ref 31–36)
MCV RBC AUTO: 90 FL (ref 80–95)
MICROALB/CREAT RATIO, 140286: 48.7 (ref 0–30)
MICROALBUMIN,URINE RANDOM 140054: 132 MG/L (ref 0.1–17)
MONOCYTES # BLD: 0.5 K/UL (ref 0.1–1)
MONOCYTES NFR BLD: 6 % (ref 3–12)
NEUTROPHILS # BLD AUTO: 6 K/UL (ref 1.8–7.7)
NON-HDL CHOLESTEROL, 011976: 149 MG/DL
PLATELET # BLD AUTO: 313 K/UL (ref 140–440)
PMV BLD AUTO: 11 FL (ref 9–13)
POTASSIUM SERPL-SCNC: 4.4 MMOL/L (ref 3.5–5.5)
PROT SERPL-MCNC: 6.7 G/DL (ref 6.4–8.3)
RBC # BLD AUTO: 5.41 M/UL (ref 3.8–5.8)
SODIUM SERPL-SCNC: 141 MMOL/L (ref 133–145)
TRIGL SERPL-MCNC: 87 MG/DL (ref 40–149)
VLDLC SERPL CALC-MCNC: 17 MG/DL (ref 8–30)
WBC # BLD AUTO: 9.3 K/UL (ref 4–11)

## 2021-12-17 ENCOUNTER — OFFICE VISIT (OUTPATIENT)
Dept: FAMILY MEDICINE CLINIC | Age: 36
End: 2021-12-17
Payer: COMMERCIAL

## 2021-12-17 VITALS
SYSTOLIC BLOOD PRESSURE: 136 MMHG | TEMPERATURE: 97.2 F | RESPIRATION RATE: 20 BRPM | WEIGHT: 315 LBS | HEART RATE: 81 BPM | BODY MASS INDEX: 44.1 KG/M2 | OXYGEN SATURATION: 98 % | HEIGHT: 71 IN | DIASTOLIC BLOOD PRESSURE: 94 MMHG

## 2021-12-17 DIAGNOSIS — E55.9 HYPOVITAMINOSIS D: ICD-10-CM

## 2021-12-17 DIAGNOSIS — E78.00 PURE HYPERCHOLESTEROLEMIA: ICD-10-CM

## 2021-12-17 DIAGNOSIS — I10 ESSENTIAL HYPERTENSION: ICD-10-CM

## 2021-12-17 DIAGNOSIS — E11.65 TYPE 2 DIABETES MELLITUS WITH HYPERGLYCEMIA, WITHOUT LONG-TERM CURRENT USE OF INSULIN (HCC): Primary | ICD-10-CM

## 2021-12-17 DIAGNOSIS — E66.01 OBESITY, CLASS III, BMI 40-49.9 (MORBID OBESITY) (HCC): ICD-10-CM

## 2021-12-17 PROCEDURE — 99214 OFFICE O/P EST MOD 30 MIN: CPT | Performed by: NURSE PRACTITIONER

## 2021-12-17 RX ORDER — AMLODIPINE, VALSARTAN AND HYDROCHLOROTHIAZIDE 10; 320; 25 MG/1; MG/1; MG/1
1 TABLET ORAL DAILY
Qty: 90 TABLET | Refills: 2 | Status: SHIPPED | OUTPATIENT
Start: 2021-12-17 | End: 2022-01-04

## 2021-12-17 RX ORDER — ERGOCALCIFEROL 1.25 MG/1
50000 CAPSULE ORAL
Qty: 12 CAPSULE | Refills: 2 | Status: SHIPPED | OUTPATIENT
Start: 2021-12-17 | End: 2022-05-12

## 2021-12-17 NOTE — PROGRESS NOTES
Chief Complaint   Patient presents with    Cholesterol Problem    Hypertension    Diabetes       1. \"Have you been to the ER, urgent care clinic since your last visit? Hospitalized since your last visit? \" No    2. \"Have you seen or consulted any other health care providers outside of the [address] [place] [address] since your last visit? \" No     3. For patients aged 39-70: Has the patient had a colonoscopy? NA based on age or sex     If the patient is female:    3. For patients aged 41-77: Has the patient had a mammogram within the past 2 years? NA based on age or sex    11. For patients aged 21-65: Has the patient had a pap smear? NA based on age or sex    Learning Assessment 9/9/2014   PRIMARY LEARNER Patient   PRIMARY LANGUAGE ENGLISH   LEARNER PREFERENCE PRIMARY DEMONSTRATION   ANSWERED BY patient   RELATIONSHIP SELF     3 most recent PHQ Screens 9/2/2021   PHQ Not Done Medical Reason (indicate in comments)   Little interest or pleasure in doing things Not at all   Feeling down, depressed, irritable, or hopeless Not at all   Total Score PHQ 2 0   Trouble falling or staying asleep, or sleeping too much -   Feeling tired or having little energy -   Poor appetite, weight loss, or overeating -   Feeling bad about yourself - or that you are a failure or have let yourself or your family down -   Trouble concentrating on things such as school, work, reading, or watching TV -   Moving or speaking so slowly that other people could have noticed; or the opposite being so fidgety that others notice -   Thoughts of being better off dead, or hurting yourself in some way -   PHQ 9 Score -   How difficult have these problems made it for you to do your work, take care of your home and get along with others -     Fall Risk Assessment, last 12 mths 3/16/2021   Able to walk? Yes   Fall in past 12 months? 0   Do you feel unsteady?  0   Are you worried about falling 0     Abuse Screening Questionnaire 7/9/2018   Do you ever feel afraid of your partner? N   Are you in a relationship with someone who physically or mentally threatens you? N   Is it safe for you to go home?  Kane Velasco

## 2021-12-17 NOTE — PATIENT INSTRUCTIONS
Low Sodium Diet (2,000 Milligram): Care Instructions  Overview     Limiting sodium can be an important part of managing some health problems. The most common source of sodium is salt. People get most of the salt in their diet from canned, prepared, and packaged foods. Fast food and restaurant meals also are very high in sodium. Your doctor will probably limit your sodium to less than 2,000 milligrams (mg) a day. This limit counts all the sodium in prepared and packaged foods and any salt you add to your food. Follow-up care is a key part of your treatment and safety. Be sure to make and go to all appointments, and call your doctor if you are having problems. It's also a good idea to know your test results and keep a list of the medicines you take. How can you care for yourself at home? Read food labels  · Read labels on cans and food packages. The labels tell you how much sodium is in each serving. Make sure that you look at the serving size. If you eat more than the serving size, you have eaten more sodium. · Food labels also tell you the Percent Daily Value for sodium. Choose products with low Percent Daily Values for sodium. · Be aware that sodium can come in forms other than salt, including monosodium glutamate (MSG), sodium citrate, and sodium bicarbonate (baking soda). MSG is often added to Asian food. When you eat out, you can sometimes ask for food without MSG or added salt. Buy low-sodium foods  · Buy foods that are labeled \"unsalted\" (no salt added), \"sodium-free\" (less than 5 mg of sodium per serving), or \"low-sodium\" (140 mg or less of sodium per serving). Foods labeled \"reduced-sodium\" and \"light sodium\" may still have too much sodium. Be sure to read the label to see how much sodium you are getting. · Buy fresh vegetables, or frozen vegetables without added sauces. Buy low-sodium versions of canned vegetables, soups, and other canned goods.   Prepare low-sodium meals  · Cut back on the amount of salt you use in cooking. This will help you adjust to the taste. Do not add salt after cooking. One teaspoon of salt has about 2,300 mg of sodium. · Take the salt shaker off the table. · Flavor your food with garlic, lemon juice, onion, vinegar, herbs, and spices. Do not use soy sauce, lite soy sauce, steak sauce, onion salt, garlic salt, celery salt, or ketchup on your food. · Use low-sodium salad dressings, sauces, and ketchup. Or make your own salad dressings and sauces without adding salt. · Use less salt (or none) when recipes call for it. You can often use half the salt a recipe calls for without losing flavor. Other foods such as rice, pasta, and grains do not need added salt. · Rinse canned vegetables, and cook them in fresh water. This removes some--but not all--of the salt. · Avoid water that is naturally high in sodium or that has been treated with water softeners, which add sodium. If you buy bottled water, read the label and choose a sodium-free brand. Avoid high-sodium foods  · Avoid eating:  ? Smoked, cured, salted, and canned meat, fish, and poultry. ? Ham, rangel, hot dogs, and luncheon meats. ? Regular, hard, and processed cheese and regular peanut butter. ? Crackers with salted tops, and other salted snack foods such as pretzels, chips, and salted popcorn. ? Frozen prepared meals, unless labeled low-sodium. ? Canned and dried soups, broths, and bouillon, unless labeled sodium-free or low-sodium. ? Canned vegetables, unless labeled sodium-free or low-sodium. ? Western [place] fries, pizza, tacos, and other fast foods. ? Pickles, olives, ketchup, and other condiments, especially soy sauce, unless labeled sodium-free or low-sodium. Where can you learn more? Go to http://www.gray.com/  Enter V843 in the search box to learn more about \"Low Sodium Diet (2,000 Milligram): Care Instructions. \"  Current as of: December 17, 2020               Content Version: 13.0  © 2006-2021 Healthwise, [place]. Care instructions adapted under license by [employer] (which disclaims liability or warranty for this information). If you have questions about a medical condition or this instruction, always ask your healthcare professional. [address] any warranty or liability for your use of this information. Learning About Low-Sodium Foods  What foods are low in sodium? The foods you eat contain nutrients, such as vitamins and minerals. Sodium is a nutrient. Your body needs the right amount to stay healthy and work as it should. You can use the list below to help you make choices about which foods to eat. Fruits  · Fresh, frozen, canned, or dried fruit  Vegetables  · Fresh or frozen vegetables, with no added salt  · Canned vegetables, low-sodium or with no added salt  Grains  · Bagels without salted tops  · Cereal with no added salt  · Corn tortillas  · Crackers with no added salt  · Oatmeal, cooked without salt  · Popcorn with no salt  · Pasta and noodles, cooked without salt  · Rice, cooked without salt  · Unsalted pretzels  Dairy and dairy alternatives  · Butter, unsalted  · Cream cheese  · Ice cream  · Milk  · Soy milk  Meats and other protein foods  · Beans and peas, canned with no salt  · Eggs  · Fresh fish (not smoked)  · Fresh meats (not smoked or cured)  · Nuts and nut butter, prepared without salt  · Poultry, not packaged with sodium solution  · Tofu, unseasoned  · Tuna, canned without salt  Seasonings  · Garlic  · Herbs and spices  · Lemon juice  · Mustard  · Olive oil  · Salt-free seasoning mixes  · Vinegar  Work with your doctor to find out how much of this nutrient you need. Depending on your health, you may need more or less of it in your diet. Where can you learn more? Go to http://www.gray.com/  Enter S460 in the search box to learn more about \"Learning About Low-Sodium Foods. \"  Current as of: December 17, 2020               Content Version: 13.0  © 2006-2021 [employer]. Care instructions adapted under license by [employer] (which disclaims liability or warranty for this information). If you have questions about a medical condition or this instruction, always ask your healthcare professional. [address] any warranty or liability for your use of this information. How to Read a Food Label to Limit Sodium: Care Instructions  Overview  Limiting sodium can be an important part of managing some health problems. Processed foods, fast food, and restaurant foods are the major sources of dietary sodium. The most common name for sodium is salt. Most packaged foods have a Nutrition Facts label. This will tell you how much sodium is in one serving of food. Follow-up care is a key part of your treatment and safety. Be sure to make and go to all appointments, and call your doctor if you are having problems. It's also a good idea to know your test results and keep a list of the medicines you take. How can you care for yourself at home? Read ingredient lists on food labels  · Read the list of ingredients on food labels to help you find how much sodium is in a food. The label lists the ingredients in a food in descending order (from the most to the least). If salt or sodium is high on the list, there may be a lot of sodium in the food. · Know that sodium has different names. Sodium is also called monosodium glutamate (MSG, common in [facility] food), sodium citrate, sodium alginate, and sodium phosphate. Read Nutrition Facts labels  · On most foods, there is a Nutrition Facts label. This will tell you how much sodium is in one serving of food. Look at both the serving size and the sodium amount. The serving size is located at the top of the label, usually right under the \"Nutrition Facts\" title. The amount of sodium is given in the list under the title.  It is given in milligrams (mg). ? Check the serving size carefully. A single serving is often very small, and you may eat more than one serving. If this is the case, you will eat more sodium than listed on the label. For example, if the serving size for a canned soup is 1 cup and the sodium amount is 470 mg, if you have 2 cups you will eat 940 mg of sodium. · The nutrition facts for fresh fruits and vegetables are not listed on the food. They may be listed somewhere in the store. These foods usually have no sodium or low sodium. · The Nutrition Facts label also gives you the Percent Daily Value for sodium. This is how much of the recommended amount of sodium a serving contains. The daily value for sodium is less than 2,300 mg. So if the Percent Daily Value says 50%, this means one serving is giving you half of this, or 1,150 mg. Buy low-sodium foods  · Look for foods that are made with less sodium. Watch for the following words on the label. ? \"Unsalted\" means there is no sodium added to the food. But there may be sodium already in the food naturally. ? \"Sodium-free\" means a serving has less than 5 mg of sodium. ? \"Very low sodium\" means a serving has 35 mg or less of sodium. ? \"Low-sodium\" means a serving has 140 mg or less of sodium. · \"Reduced-sodium\" means that there is 25% less sodium than what the food normally has. This is still usually too much sodium. Try not to buy foods with this on the label. · Buy fresh vegetables, or frozen vegetables without added sauces. Buy low-sodium versions of canned vegetables, soups, and other canned goods. Where can you learn more? Go to http://www.gray.com/  Enter B757 in the search box to learn more about \"How to Read a Food Label to Limit Sodium: Care Instructions. \"  Current as of: December 17, 2020               Content Version: 13.0  © 8095-4590 Healthwise, [facility].    Care instructions adapted under license by [employer] (which disclaims liability or warranty for this information). If you have questions about a medical condition or this instruction, always ask your healthcare professional. [address] any warranty or liability for your use of this information.

## 2021-12-17 NOTE — PROGRESS NOTES
Subjective:    Maxine Martinez is a 39y.o. year old male seen for follow up of diabetes. He also has hypertension and hyperlipidemia. Diabetic Review of Systems - medication compliance: compliant all of the time, diabetic diet compliance: noncompliant some of the time, home glucose monitoring: is performed regularly, fasting values range 140s, further diabetic ROS: no polyuria or polydipsia, no chest pain, dyspnea or TIA's, no numbness, tingling or pain in extremities, last eye exam approximately 1 year ago. Patient states he has not been cleared to exercise as of yet by urology. Other symptoms and concerns: none. Patient Active Problem List   Diagnosis Code    Motion sickness T75. 3XXA    Kidney stone N20.0    Diabetes mellitus, type 2 ([facility]) E11.9    Cellulitis, scrotum N49.2    Sepsis ([facility]) A41.9    Cellulitis, perineum L03.315    Hyperglycemia due to type 2 diabetes mellitus ([facility] [address] E11.65    Hypokalemia E87.6    Obesity, morbid ([facility]) E66.01    Type 2 diabetes with nephropathy ([facility]) E11.21    Right inguinal hernia K40.90     Current Outpatient Medications   Medication Sig Dispense Refill    fenofibrate nanocrystallized (TRICOR) 145 mg tablet TAKE 1 TABLET BY MOUTH  DAILY 90 Tablet 3    Insulin Needles, Disposable, (BD Ultra-Fine Short Pen Needle) 31 gauge x 5/16\" ndle USE WITH LANTUS SOLOSTAR 1 Package 5    glucose blood VI test strips (OneTouch Ultra Blue Test Strip) strip Check glucose 2 times daily 200 Strip 3    SITagliptin-metFORMIN (JANUMET) 50-1,000 mg per tablet Take 1 Tablet by mouth two (2) times daily (with meals). 180 Tablet 1    amLODIPine-valsartan (EXFORGE)  mg per tablet Take 1 Tablet by mouth daily. 90 Tablet 2    Lantus Solostar U-100 Insulin 100 unit/mL (3 mL) inpn INJECT SUBCUTANEOUSLY 20  UNITS AT NIGHT 30 mL 3    aspirin 81 mg chewable tablet Take 81 mg by mouth daily.  omeprazole (PRILOSEC) 20 mg capsule Take 1 Cap by mouth Daily (before breakfast).  27 Cap 1    Blood-Glucose Meter monitoring kit One touch mini 1 kit 0    trospium (SANCTURA) 20 mg tablet Take 1 Tablet by mouth two (2) times a day. (Patient not taking: Reported on 12/17/2021) 60 Tablet 11    ergocalciferol (ERGOCALCIFEROL) 1,250 mcg (50,000 unit) capsule Take 1 Capsule by mouth every seven (7) days. (Patient not taking: Reported on 12/17/2021) 4 Capsule 2    tamsulosin (FLOMAX) 0.4 mg capsule Take 1 Cap by mouth daily (after dinner). (Patient not taking: Reported on 12/17/2021) 90 Cap 3      Allergies   Allergen Reactions    Pedicaefg-Hs-Javsayfr-Guaifen Other (comments)     Pt states it causes urinary retention.  Tylenol Cold [Qjm-Skbbaluqy-Av-Acetaminophen] Swelling     Past Surgical History:   Procedure Laterality Date    HX KNEE ARTHROSCOPY      HX UROLOGICAL      kidney stone extraction     Family History   Problem Relation Age of Onset    Cancer Father     Hypertension Father     Diabetes Father     Cancer Paternal Uncle     Hypertension Mother       Lab Results   Component Value Date/Time    Cholesterol, total 193 12/13/2021 11:45 AM    HDL Cholesterol 44 12/13/2021 11:45 AM    LDL,Direct 92 01/11/2019 08:34 AM    LDL, calculated 132 (H) 12/13/2021 11:45 AM    VLDL, calculated 17 12/13/2021 11:45 AM    Triglyceride 87 12/13/2021 11:45 AM     Lab Results   Component Value Date/Time    Sodium 141 12/13/2021 11:45 AM    Potassium 4.4 12/13/2021 11:45 AM    Chloride 103 12/13/2021 11:45 AM    CO2 26 12/13/2021 11:45 AM    Anion gap 12.0 12/13/2021 11:45 AM    Glucose 121 (H) 12/13/2021 11:45 AM    BUN 12 12/13/2021 11:45 AM    Creatinine 0.9 12/13/2021 11:45 AM    BUN/Creatinine ratio 16 03/23/2015 09:17 AM    GFR est AA >60 06/24/2018 06:01 AM    GFR est non-AA >60 06/24/2018 06:01 AM    Calcium 9.5 12/13/2021 11:45 AM    Bilirubin, total 0.4 12/13/2021 11:45 AM    Alk.  phosphatase 64 12/13/2021 11:45 AM    Protein, total 6.7 12/13/2021 11:45 AM    Albumin 4.5 12/13/2021 11:45 AM Globulin 2.2 12/13/2021 11:45 AM    A-G Ratio 2.0 12/13/2021 11:45 AM    ALT (SGPT) 19 12/13/2021 11:45 AM    AST (SGOT) 16 12/13/2021 11:45 AM     Lab Results   Component Value Date/Time    WBC 9.3 12/13/2021 11:45 AM    HGB 15.3 12/13/2021 11:45 AM    HCT 48.9 12/13/2021 11:45 AM    PLATELET 583 02/79/6779 11:45 AM    MCV 90 12/13/2021 11:45 AM     Lab Results   Component Value Date/Time    Hemoglobin A1c 6.5 (H) 12/13/2021 11:45 AM    Hemoglobin A1c (POC) 6.3 02/07/2020 08:14 AM    Hemoglobin A1c, External 8.6 12/27/2019 12:00 AM     Lab Results   Component Value Date/Time    Microalb/Creat ratio (ug/mg creat.) 48.7 (H) 12/13/2021 11:45 AM     Wt Readings from Last 3 Encounters:   12/17/21 (!) 353 lb 6.4 oz (160.3 kg)   08/19/21 338 lb (153.3 kg)   06/17/21 338 lb (153.3 kg)     Last Point of Care HGB A1C  Hemoglobin A1c (POC)   Date Value Ref Range Status   02/07/2020 6.3 % Final      BP Readings from Last 3 Encounters:   12/17/21 (!) 142/86   05/28/21 136/88   05/18/21 (!) 141/93     Results for orders placed or performed in visit on 12/13/21   LIPID PANEL   Result Value Ref Range    Triglyceride 87 40 - 149 mg/dL    HDL Cholesterol 44 >=40 mg/dL    Cholesterol, total 193 110 - 200 mg/dL    CHOLESTEROL/HDL 4.4 0.0 - 5.0    Non-HDL Cholesterol 149 (H) <130 mg/dL    LDL, calculated 132 (H) 50 - 99 mg/dL    VLDL, calculated 17 8 - 30 mg/dL    LDL/HDL Ratio 3.0    METABOLIC PANEL, COMPREHENSIVE   Result Value Ref Range    Glucose 121 (H) 70 - 99 mg/dL    BUN 12 6 - 22 mg/dL    Creatinine 0.9 0.5 - 1.2 mg/dL    Sodium 141 133 - 145 mmol/L    Potassium 4.4 3.5 - 5.5 mmol/L    Chloride 103 98 - 110 mmol/L    CO2 26 20 - 32 mmol/L    AST (SGOT) 16 10 - 37 U/L    ALT (SGPT) 19 5 - 40 U/L    Alk.  phosphatase 64 25 - 115 U/L    Bilirubin, total 0.4 0.2 - 1.2 mg/dL    Calcium 9.5 8.4 - 10.5 mg/dL    Protein, total 6.7 6.4 - 8.3 g/dL    Albumin 4.5 3.5 - 5.0 g/dL    A-G Ratio 2.0 1.1 - 2.6 ratio    Globulin 2.2 2.0 - 4.0 g/dL    Anion gap 12.0 3.0 - 15.0 mmol/L    GFRAA >60.0 >60.0    GFRNA >60.0 >60.0   VITAMIN D, 25 HYDROXY   Result Value Ref Range    VITAMIN D, 25-HYDROXY 25.1 (L) 32.0 - 100.0 ng/mL   CBC WITH AUTOMATED DIFF   Result Value Ref Range    WBC 9.3 4.0 - 11.0 K/uL    RBC 5.41 3.80 - 5.80 M/uL    HGB 15.3 13.2 - 17.3 g/dL    HCT 48.9 36.6 - 51.9 %    MCV 90 80 - 95 fL    MCH 28 26 - 34 pg    MCHC 31 31 - 36 g/dL    RDW 13.4 10.0 - 15.5 %    PLATELET 139 265 - 875 K/uL    MPV 11.0 9.0 - 13.0 fL    NEUTROPHILS 65 40 - 75 %    Lymphocytes 27 20 - 45 %    MONOCYTES 6 3 - 12 %    EOSINOPHILS 2 0 - 6 %    BASOPHILS 0 0 - 2 %    ABS. NEUTROPHILS 6.0 1.8 - 7.7 K/uL    ABSOLUTE LYMPHOCYTE COUNT 2.5 1.0 - 4.8 K/uL    ABS. MONOCYTES 0.5 0.1 - 1.0 K/uL    ABS. EOSINOPHILS 0.2 0.0 - 0.5 K/uL    ABS. BASOPHILS 0.0 0.0 - 0.2 K/uL   HEMOGLOBIN A1C W/O EAG   Result Value Ref Range    Hemoglobin A1c 6.5 (H) 4.8 - 5.6 %    AVG  (H) 91 - 123 mg/dL   MICROALBUMIN, UR, RAND W/ MICROALB/CREAT RATIO   Result Value Ref Range    Creatinine, urine 271 mg/dL    Microalbumin, urine 132.0 (H) 0.1 - 17.0 mg/L    Microalb/Creat ratio (ug/mg creat.) 48.7 (H) 0.0 - 30.0         Last Diabetic Foot Exam on:   Diabetic Foot and Eye Exam HM Status   Topic Date Due    Eye Exam  02/05/2021    Diabetic Foot Care  05/28/2022     Objective:  Visit Vitals  BP (!) 136/94 (BP 1 Location: Right upper arm, BP Patient Position: Sitting, BP Cuff Size: Large adult)   Pulse 81   Temp 97.2 °F (36.2 °C) (Temporal)   Resp 20   Ht 5' 11\" (1.803 m)   Wt (!) 353 lb 6.4 oz (160.3 kg)   SpO2 98%   BMI 49.29 kg/m²     Awake and alert in no acute distress   Neck supple without lymphadenopathy, no carotid artery bruits auscultated bilaterally. No thyromegaly   Lungs clear throughout   S1 S2 RRR without ectopy or murmur auscultated.    Extremities: no clubbing, cyanosis, peripheral edema   Abdomen - soft, nontender, nondistended, no masses or organomegaly  Integumentary: no rashes   Reviewed vital signs         Assessment/Plan:    ICD-10-CM ICD-9-CM    1. Type 2 diabetes mellitus with hyperglycemia, without long-term current use of insulin ([facility])  E11.65 250.00      790.29    2. Essential hypertension  I10 401.9    3. Pure hypercholesterolemia  E78.00 272.0    4. Hypovitaminosis D  E55.9 268.9    5. Obesity, Class III, BMI 40-49.9 (morbid obesity) ([facility])  E66.01 278.01      Orders Placed This Encounter    amLODIPine-Valsartan-HCTZ (Exforge HCT) -25 mg tab    ergocalciferol (ERGOCALCIFEROL) 1,250 mcg (50,000 unit) capsule     HTN not completely controlled begin Exforge HCT as above and reinforced low sodium diet. Issues reviewed with him: low cholesterol diet, weight control and daily exercise discussed. I have discussed the diagnosis with the patient and the intended plan as seen in the above orders. The patient has received an after-visit summary and questions were answered concerning future plans. I have discussed medication side effects and warnings with the patient as well. Patient agreeable with above plan and verbalizes understanding. Follow-up and Dispositions    · Return in about 4 months (around 4/17/2022) for DM/HTN/HLD, in office follow up, fasting labs 1 wk prior.

## 2022-01-04 RX ORDER — AMLODIPINE AND VALSARTAN 10; 320 MG/1; MG/1
1 TABLET ORAL DAILY
Qty: 90 TABLET | Refills: 2 | Status: SHIPPED | OUTPATIENT
Start: 2022-01-04 | End: 2022-10-15

## 2022-01-04 RX ORDER — HYDROCHLOROTHIAZIDE 25 MG/1
25 TABLET ORAL DAILY
Qty: 90 TABLET | Refills: 2 | Status: SHIPPED | OUTPATIENT
Start: 2022-01-04

## 2022-03-03 NOTE — TELEPHONE ENCOUNTER
Last Visit: 12/17/21 with SANJUANITA Cortés  Next Appointment: Advised to follow-up in 4 months  Previous Refill Encounter(s): 7/12/21 #180 with 1 refill    Requested Prescriptions     Pending Prescriptions Disp Refills    SITagliptin-metFORMIN (JANUMET) 50-1,000 mg per tablet 180 Tablet 1     Sig: Take 1 Tablet by mouth two (2) times daily (with meals).

## 2022-03-18 PROBLEM — E11.65 HYPERGLYCEMIA DUE TO TYPE 2 DIABETES MELLITUS (HCC): Status: ACTIVE | Noted: 2018-06-14

## 2022-03-18 PROBLEM — L03.315 CELLULITIS, PERINEUM: Status: ACTIVE | Noted: 2018-06-14

## 2022-03-19 PROBLEM — E66.01 OBESITY, MORBID (HCC): Status: ACTIVE | Noted: 2018-07-09

## 2022-03-19 PROBLEM — A41.9 SEPSIS (HCC): Status: ACTIVE | Noted: 2018-06-14

## 2022-03-19 PROBLEM — N49.2 CELLULITIS, SCROTUM: Status: ACTIVE | Noted: 2018-06-14

## 2022-03-19 PROBLEM — E87.6 HYPOKALEMIA: Status: ACTIVE | Noted: 2018-06-18

## 2022-03-19 PROBLEM — E11.21 TYPE 2 DIABETES WITH NEPHROPATHY (HCC): Status: ACTIVE | Noted: 2018-07-16

## 2022-03-20 PROBLEM — K40.90 RIGHT INGUINAL HERNIA: Status: ACTIVE | Noted: 2021-03-01

## 2022-05-12 RX ORDER — ERGOCALCIFEROL 1.25 MG/1
CAPSULE ORAL
Qty: 13 CAPSULE | Refills: 3 | Status: SHIPPED | OUTPATIENT
Start: 2022-05-12

## 2022-06-13 RX ORDER — BLOOD SUGAR DIAGNOSTIC
STRIP MISCELLANEOUS
Qty: 200 STRIP | Refills: 0 | Status: SHIPPED | OUTPATIENT
Start: 2022-06-13 | End: 2022-11-03

## 2022-06-13 NOTE — TELEPHONE ENCOUNTER
Last Visit: 12/17/21 with NP Tee Seo  Next Appointment: Advised to follow-up in 4 months  Previous Refill Encounter(s): 7/19/21 #200 with 3 refills    Requested Prescriptions     Pending Prescriptions Disp Refills    glucose blood VI test strips (OneTouch Ultra Test) strip 200 Strip 3     Sig: Check glucose 2 times daily         For Pharmacy [id number] [place] [address] Road in place:    Recommendation Provided To:    Intervention Detail: New Rx: 1, reason: Patient Preference and Scheduled Appointment   Gap Closed?:    Intervention Accepted By:   Anne Time Spent (min): 5

## 2022-06-27 DIAGNOSIS — E11.65 TYPE 2 DIABETES MELLITUS WITH HYPERGLYCEMIA, WITHOUT LONG-TERM CURRENT USE OF INSULIN (HCC): ICD-10-CM

## 2022-06-28 RX ORDER — INSULIN GLARGINE 100 [IU]/ML
INJECTION, SOLUTION SUBCUTANEOUS
Qty: 30 ML | Refills: 3 | Status: SHIPPED | OUTPATIENT
Start: 2022-06-28

## 2022-08-11 NOTE — PATIENT INSTRUCTIONS
Patient Information:   Name: Jacqui Hurtado   Gender: Male   YOB: 1985   Age: 38 years   Address: [address] [place] [address] Zip: [place], [address] [place] [address]     SSN: [id number]   Patient ID: [id number]   Phone: [phone]       Alt Control#:     Alt Patient ID: [id number]            CPT TESTS ORDERED (Total: 1) PRIORITY      [address]491 XR CHEST PA LAT Routine Routine             Reason for Exam:        Comments:         Diagnosis Codes:   U07.1 J12.82               Bill Type:           Responsible Party / Barbara Garcia Information:   Name: Jacqui Hurtado   Address: [address] [place] [address] Zip: [place] [address] [place] [address]    Phone: [phone]   Relation to Pt: Self   Employer Name: [employer]         ABN:      Worker's Comp: N Date of Injury:              Insurance Information:   Primary Insurance: Secondary Insurance:   [facility]   Address 1: [place] [address]   Address 2: [place] [address] [address] [facility]   Policy Number: [id number]   Group #: [id number]    Ins Co Name:     Address 1:     Address 2: [place], [place] Zip:     Policy Number:     Group #:        Primary Policy [place] / Insured: [address] / Insured:   Name: Jacqui Hurtado   Address: [address] [facility] , [address] [place] [address]    Pt Relation to Subscriber: Self    Name:     Address:          Pt Relation to Subscriber:           Order Providers     Name Phone   Ordering Provider Tom Guerin, [address] [facility]   [address] [facility] Provider Tom Guerin, [phone]   Order Date: 9/2/21 Order Time: 4:11 PM Expected Date: 9/2/21      Signature (For External Use):_____________________________   Electronically Signed by: Tom Guerin The groin and right neck was prepped. The site was prepped with ChloraPrep. The patient was draped. The patient was positioned supine.

## 2022-08-24 RX ORDER — FENOFIBRATE 145 MG/1
TABLET, COATED ORAL
Qty: 90 TABLET | Refills: 3 | Status: SHIPPED | OUTPATIENT
Start: 2022-08-24

## 2022-08-25 ENCOUNTER — APPOINTMENT (OUTPATIENT)
Dept: FAMILY MEDICINE CLINIC | Age: 37
End: 2022-08-25

## 2022-08-25 DIAGNOSIS — E78.00 PURE HYPERCHOLESTEROLEMIA: ICD-10-CM

## 2022-08-25 DIAGNOSIS — E55.9 HYPOVITAMINOSIS D: ICD-10-CM

## 2022-08-25 DIAGNOSIS — E11.65 TYPE 2 DIABETES MELLITUS WITH HYPERGLYCEMIA, WITHOUT LONG-TERM CURRENT USE OF INSULIN (HCC): ICD-10-CM

## 2022-08-25 DIAGNOSIS — I10 ESSENTIAL HYPERTENSION: ICD-10-CM

## 2022-08-26 LAB
25(OH)D3 SERPL-MCNC: 36.6 NG/ML (ref 32–100)
A-G RATIO,AGRAT: 2.5 RATIO (ref 1.1–2.6)
ABSOLUTE LYMPHOCYTE COUNT, 10803: 2.8 K/UL (ref 1–4.8)
ALBUMIN SERPL-MCNC: 4.7 G/DL (ref 3.5–5)
ALP SERPL-CCNC: 86 U/L (ref 25–115)
ALT SERPL-CCNC: 36 U/L (ref 5–40)
ANION GAP SERPL CALC-SCNC: 12 MMOL/L (ref 3–15)
AST SERPL W P-5'-P-CCNC: 18 U/L (ref 10–37)
AVG GLU, 10930: 246 MG/DL (ref 91–123)
BASOPHILS # BLD: 0 K/UL (ref 0–0.2)
BASOPHILS NFR BLD: 0 % (ref 0–2)
BILIRUB SERPL-MCNC: 1 MG/DL (ref 0.2–1.2)
BUN SERPL-MCNC: 14 MG/DL (ref 6–22)
CALCIUM SERPL-MCNC: 9.5 MG/DL (ref 8.4–10.5)
CHLORIDE SERPL-SCNC: 98 MMOL/L (ref 98–110)
CHOLEST SERPL-MCNC: 198 MG/DL (ref 110–200)
CO2 SERPL-SCNC: 27 MMOL/L (ref 20–32)
CREAT SERPL-MCNC: 1 MG/DL (ref 0.5–1.2)
CREATININE, URINE: 81 MG/DL
EOSINOPHIL # BLD: 0.3 K/UL (ref 0–0.5)
EOSINOPHIL NFR BLD: 3 % (ref 0–6)
ERYTHROCYTE [DISTWIDTH] IN BLOOD BY AUTOMATED COUNT: 13.9 % (ref 10–15.5)
GLOBULIN,GLOB: 1.9 G/DL (ref 2–4)
GLOMERULAR FILTRATION RATE: >60 ML/MIN/1.73 SQ.M.
GLUCOSE SERPL-MCNC: 303 MG/DL (ref 70–99)
GRANULOCYTES,GRANS: 62 % (ref 40–75)
HBA1C MFR BLD HPLC: 10.2 % (ref 4.8–5.6)
HCT VFR BLD AUTO: 49.6 % (ref 36.6–51.9)
HDLC SERPL-MCNC: 33 MG/DL
HDLC SERPL-MCNC: 6 MG/DL (ref 0–5)
HGB BLD-MCNC: 16 G/DL (ref 13.2–17.3)
LDL, DIRECT,DLDL: 106 MG/DL (ref 50–99)
LDL/HDL RATIO,LDHD: 3.2
LDLC SERPL CALC-MCNC: ABNORMAL MG/DL
LYMPHOCYTES, LYMLT: 28 % (ref 20–45)
MCH RBC QN AUTO: 29 PG (ref 26–34)
MCHC RBC AUTO-ENTMCNC: 32 G/DL (ref 31–36)
MCV RBC AUTO: 89 FL (ref 80–95)
MICROALB/CREAT RATIO, 140286: 180.2 (ref 0–30)
MICROALBUMIN,URINE RANDOM 140054: 146 MG/L (ref 0.1–17)
MONOCYTES # BLD: 0.6 K/UL (ref 0.1–1)
MONOCYTES NFR BLD: 6 % (ref 3–12)
NEUTROPHILS # BLD AUTO: 6.2 K/UL (ref 1.8–7.7)
NON-HDL CHOLESTEROL, 011976: ABNORMAL
PLATELET # BLD AUTO: 324 K/UL (ref 140–440)
PMV BLD AUTO: 10.7 FL (ref 9–13)
POTASSIUM SERPL-SCNC: 4.2 MMOL/L (ref 3.5–5.5)
PROT SERPL-MCNC: 6.6 G/DL (ref 6.4–8.3)
RBC # BLD AUTO: 5.58 M/UL (ref 3.8–5.8)
SODIUM SERPL-SCNC: 137 MMOL/L (ref 133–145)
TRIGL SERPL-MCNC: 410 MG/DL (ref 40–149)
VLDLC SERPL CALC-MCNC: ABNORMAL MG/DL
WBC # BLD AUTO: 9.9 K/UL (ref 4–11)

## 2022-09-02 ENCOUNTER — OFFICE VISIT (OUTPATIENT)
Dept: FAMILY MEDICINE CLINIC | Age: 37
End: 2022-09-02
Payer: COMMERCIAL

## 2022-09-02 VITALS
WEIGHT: 315 LBS | BODY MASS INDEX: 47.56 KG/M2 | OXYGEN SATURATION: 98 % | SYSTOLIC BLOOD PRESSURE: 125 MMHG | HEART RATE: 79 BPM | TEMPERATURE: 98.4 F | DIASTOLIC BLOOD PRESSURE: 81 MMHG

## 2022-09-02 DIAGNOSIS — I10 ESSENTIAL HYPERTENSION: ICD-10-CM

## 2022-09-02 DIAGNOSIS — E66.01 OBESITY, CLASS III, BMI 40-49.9 (MORBID OBESITY) (HCC): ICD-10-CM

## 2022-09-02 DIAGNOSIS — E78.00 PURE HYPERCHOLESTEROLEMIA: ICD-10-CM

## 2022-09-02 DIAGNOSIS — E11.65 TYPE 2 DIABETES MELLITUS WITH HYPERGLYCEMIA, WITHOUT LONG-TERM CURRENT USE OF INSULIN (HCC): Primary | ICD-10-CM

## 2022-09-02 DIAGNOSIS — E55.9 HYPOVITAMINOSIS D: ICD-10-CM

## 2022-09-02 PROCEDURE — 99214 OFFICE O/P EST MOD 30 MIN: CPT | Performed by: NURSE PRACTITIONER

## 2022-09-02 PROCEDURE — 3046F HEMOGLOBIN A1C LEVEL >9.0%: CPT | Performed by: NURSE PRACTITIONER

## 2022-09-02 RX ORDER — SEMAGLUTIDE 1.34 MG/ML
INJECTION, SOLUTION SUBCUTANEOUS
Qty: 2 BOX | Refills: 0 | Status: SHIPPED | OUTPATIENT
Start: 2022-09-02 | End: 2022-10-19 | Stop reason: DRUGHIGH

## 2022-09-02 NOTE — PROGRESS NOTES
1. \"Have you been to the ER, urgent care clinic since your last visit? Hospitalized since your last visit? \" No    2. \"Have you seen or consulted any other health care providers outside of the [address] [place] [address] since your last visit? \" No     3. For patients aged 39-70: Has the patient had a colonoscopy / FIT/ Cologuard? NA - based on age      If the patient is female:    4. For patients aged 41-77: Has the patient had a mammogram within the past 2 years? NA - based on age or sex      11. For patients aged 21-65: Has the patient had a pap smear?  NA - based on age or sex

## 2022-09-02 NOTE — PROGRESS NOTES
Subjective:    Daniel Blankenship is a 39y.o. year old male seen for follow up of diabetes. He also has hypertension and hyperlipidemia. Diabetic Review of Systems - medication compliance: compliant all of the time, diabetic diet compliance: patient states he has been eating a lot of fast foods, comments after seeing his labs he has started to make changes, reports he has been eating fruit/yogurt dinner, not stopping as much to eat out, has decreased his diet soda intake and drinking more water, home glucose monitoring: is performed regularly, fasting values range 210-220, nonfasting values range 290, further diabetic ROS: no polyuria or polydipsia, no chest pain, dyspnea or TIA's, no numbness, tingling or pain in extremities, last eye exam a couple of years ago, doesn't recall date. States his insurance did not cover the dexcom for continuous glucose monitoring. He is able to control his glucose better with CGM systems. Other symptoms and concerns: none. 10/13/2021 virtual visit  Daniel Blankenship is a 39 y.o. male who was seen by synchronous (real-time) audio-video technology on 10/13/2021 for Diabetes     Subjective:   Patient states his glucose is now 120-130s fasting. States he was able to resume his previous dosage of lantus 20 units 2 weeks ago. Comments breathing is a lot better. Assessment & Plan:      Diagnoses and all orders for this visit:     1. Type 2 diabetes mellitus with hyperglycemia, without long-term current use of insulin (HCC)  -     HEMOGLOBIN A1C WITH EAG; Future  -     LIPID PANEL; Future  -     METABOLIC PANEL, COMPREHENSIVE; Future  -     MICROALBUMIN, UR, RAND W/ MICROALB/CREAT RATIO; Future  -     CBC WITH AUTOMATED DIFF; Future     2. Essential hypertension  -     LIPID PANEL; Future  -     METABOLIC PANEL, COMPREHENSIVE; Future     3. Pure hypercholesterolemia  -     LIPID PANEL; Future  -     METABOLIC PANEL, COMPREHENSIVE; Future     4.  Hypovitaminosis D  -     VITAMIN D, 25 HYDROXY; Future       Follow-up and Dispositions    Return in about 2 months (around 12/13/2021) for DM/HTN/HLD, fasting labs 1 wk prior, in office follow up. 9/2/2021 virtual visit  Cash Hanks is a 38 y.o. male who was seen by synchronous (real-time) audio-video technology on 9/2/2021 for No chief complaint on file. Subjective:   Patient states he was dx with COVID on 8/1/2021. Comments he was dx with COVID pneumonia and admitted to [place] [address] 8/1/2021-8/5/2021  Patient states he glucose readings were in the 200s-300s with taking prednisone. Patient states his glucose readings are decreasing however, continue to be in high 100s-low 200s for the last week. Reports he does continue to get sob and also coughing with talking. Assessment & Plan:      Diagnoses and all orders for this visit:     1. Pneumonia due to COVID-19 virus  -     XR CHEST PA LAT; Future     2. Hospital discharge follow-up      Instructed to increase lantus to 25 units until glucose readings are consistently less 150. Follow-up and Dispositions    Return in about 2 weeks (around 9/16/2021) for DM.    5/28/2021  Subjective:     Cash Hanks is a 38y.o. year old male seen for follow up of diabetes. He also has hypertension and hyperlipidemia. Diabetic Review of Systems - medication compliance: compliant all of the time, diabetic diet compliance: states he has been eating better, comments he has making changes to this diet, home glucose monitoring: is performed regularly, fasting values range 140s, further diabetic ROS: no polyuria or polydipsia, no chest pain, dyspnea or TIA's, no numbness, tingling or pain in extremities, last eye exam approximately over 1 year ago ago. States he purchased a stationary bike and elliptical which is going to begin using. Hypertension ROS: taking medications as instructed, no medication side effects noted, no TIA's, no chest pain on exertion, no dyspnea on exertion, no swelling of ankles. Has been tolerating Exforge well without any adverse effects. States he feels like this medication is working better to help control his blood pressure. Other symptoms and concerns: Patient reports sneezing states he felt a pop in his right groin area. States he is able to to push the area back in. Awaiting return call from surgeon's office. Assessment/Plan:      ICD-10-CM ICD-9-CM     1. Type 2 diabetes mellitus with hyperglycemia, without long-term current use of insulin ([facility])  E11.65 250.00  DIABETES FOOT EXAM       790.29     2. Essential hypertension  I10 401.9     3. Pure hypercholesterolemia  E78.00 272.0     4. Hypovitaminosis D  E55.9 268.9            Orders Placed This Encounter    ergocalciferol (ERGOCALCIFEROL) 1,250 mcg (50,000 unit) capsule      Follow-up and Dispositions    Return in about 4 months (around 9/28/2021) for DM/HTN/HLD, in office follow up, fasting labs 1 wk prior. Patient Active Problem List   Diagnosis Code    Motion sickness T75. 3XXA    Kidney stone N20.0    Diabetes mellitus, type 2 ([facility]) E11.9    Cellulitis, scrotum N49.2    Sepsis ([facility] [address] A41.9    Cellulitis, perineum L03.315    Hyperglycemia due to type 2 diabetes mellitus ([facility]) E11.65    Hypokalemia E87.6    Obesity, morbid ([facility]) E66.01    Type 2 diabetes with nephropathy ([facility]) E11.21    Right inguinal hernia K40.90     Current Outpatient Medications   Medication Sig Dispense Refill    fenofibrate nanocrystallized (TRICOR) 145 mg tablet TAKE 1 TABLET BY MOUTH  DAILY 90 Tablet 3    Lantus Solostar U-100 Insulin 100 unit/mL (3 mL) inpn INJECT SUBCUTANEOUSLY 20  UNITS AT NIGHT 30 mL 3    glucose blood VI test strips (OneTouch Ultra Test) strip Check glucose 2 times daily.  Appt required before next fill will be authorized 200 Strip 0    tamsulosin (FLOMAX) 0.4 mg capsule TAKE 1 CAPSULE BY MOUTH IN  THE EVENING AFTER DINNER 90 Capsule 3    Vitamin D2 1,250 mcg (50,000 unit) capsule TAKE 1 CAPSULE BY MOUTH  EVERY 7 DAYS 13 Capsule 3    SITagliptin-metFORMIN (JANUMET) 50-1,000 mg per tablet Take 1 Tablet by mouth two (2) times daily (with meals). 180 Tablet 1    amLODIPine-valsartan (EXFORGE)  mg per tablet Take 1 Tablet by mouth daily. 90 Tablet 2    hydroCHLOROthiazide (HYDRODIURIL) 25 mg tablet Take 1 Tablet by mouth daily. 90 Tablet 2    trospium (SANCTURA) 20 mg tablet Take 1 Tablet by mouth two (2) times a day. (Patient not taking: Reported on 12/17/2021) 60 Tablet 11    Insulin Needles, Disposable, (BD Ultra-Fine Short Pen Needle) 31 gauge x 5/16\" ndle USE WITH LANTUS SOLOSTAR 1 Package 5    glucose blood VI test strips (OneTouch Ultra Blue Test Strip) strip Check glucose 2 times daily 200 Strip 3    aspirin 81 mg chewable tablet Take 81 mg by mouth daily. omeprazole (PRILOSEC) 20 mg capsule Take 1 Cap by mouth Daily (before breakfast). 30 Cap 1    Blood-Glucose Meter monitoring kit One touch mini 1 kit 0      Allergies   Allergen Reactions    Yslhitlix-Yi-Ocbegder-Guaifen Other (comments)     Pt states it causes urinary retention. Tylenol Cold [Gll-Ptulrqjep-Cu-Acetaminophen] Swelling     Past Surgical History:   Procedure Laterality Date    HX KNEE ARTHROSCOPY      HX UROLOGICAL      kidney stone extraction     Family History   Problem Relation Age of Onset    Cancer Father     Hypertension Father     Diabetes Father     Cancer Paternal Uncle     Hypertension Mother       Lab Results   Component Value Date/Time    Cholesterol, total 198 08/25/2022 08:01 AM    HDL Cholesterol 33 (L) 08/25/2022 08:01 AM    LDL,Direct 106 (H) 08/25/2022 08:01 AM    LDL-C, External 111 12/27/2019 12:00 AM    LDL, calculated  08/25/2022 08:01 AM      Comment:      Triglyceride value is too high to calculate LDL. Marylou Pepper Direct LDL is being  performed. VLDL, calculated  08/25/2022 08:01 AM      Comment:       Unable to calculate. Triglyceride value is too high.         Triglyceride 410 (H) 08/25/2022 08:01 AM     Lab Results Component Value Date/Time    Sodium 137 08/25/2022 08:01 AM    Potassium 4.2 08/25/2022 08:01 AM    Chloride 98 08/25/2022 08:01 AM    CO2 27 08/25/2022 08:01 AM    Anion gap 12.0 08/25/2022 08:01 AM    Glucose 303 (H) 08/25/2022 08:01 AM    BUN 14 08/25/2022 08:01 AM    Creatinine 1.0 08/25/2022 08:01 AM    BUN/Creatinine ratio 16 03/23/2015 09:17 AM    GFR est AA >60 06/24/2018 06:01 AM    GFR est non-AA >60 06/24/2018 06:01 AM    Calcium 9.5 08/25/2022 08:01 AM    Bilirubin, total 1.0 08/25/2022 08:01 AM    Alk.  phosphatase 86 08/25/2022 08:01 AM    Protein, total 6.6 08/25/2022 08:01 AM    Albumin 4.7 08/25/2022 08:01 AM    Globulin 1.9 (L) 08/25/2022 08:01 AM    A-G Ratio 2.5 08/25/2022 08:01 AM    ALT (SGPT) 36 08/25/2022 08:01 AM    AST (SGOT) 18 08/25/2022 08:01 AM     Lab Results   Component Value Date/Time    WBC 9.9 08/25/2022 08:01 AM    HGB 16.0 08/25/2022 08:01 AM    HCT 49.6 08/25/2022 08:01 AM    PLATELET 870 62/33/4004 08:01 AM    MCV 89 08/25/2022 08:01 AM     Lab Results   Component Value Date/Time    Hemoglobin A1c 10.2 (H) 08/25/2022 08:01 AM    Hemoglobin A1c (POC) 6.3 02/07/2020 08:14 AM    Hemoglobin A1c, External 8.6 12/27/2019 12:00 AM     Lab Results   Component Value Date/Time    Microalb/Creat ratio (ug/mg creat.) 180.2 (H) 08/25/2022 08:01 AM     Wt Readings from Last 3 Encounters:   12/17/21 (!) 353 lb 6.4 oz (160.3 kg)   08/19/21 338 lb (153.3 kg)   06/17/21 338 lb (153.3 kg)     Last Point of Care HGB A1C  Hemoglobin A1c (POC)   Date Value Ref Range Status   02/07/2020 6.3 % Final      BP Readings from Last 3 Encounters:   12/17/21 (!) 136/94   05/28/21 136/88   05/18/21 (!) 141/93       Results for orders placed or performed in visit on 08/25/22   HEMOGLOBIN A1C W/O EAG   Result Value Ref Range    Hemoglobin A1c 10.2 (H) 4.8 - 5.6 %    AVG  (H) 91 - 123 mg/dL   LDL, DIRECT   Result Value Ref Range    LDL,Direct 106 (H) 50 - 99 mg/dL         Last Diabetic Foot Exam on: Diabetic Foot and Eye Exam  Status   Topic Date Due    Eye Exam  02/05/2021    Diabetic Foot Care  05/28/2022       Objective:  Visit Vitals  /81 (BP 1 Location: Right upper arm, BP Patient Position: Sitting)   Pulse 79   Temp 98.4 °F (36.9 °C) (Temporal)   Wt 341 lb (154.7 kg)   SpO2 98%   BMI 47.56 kg/m²     Awake and alert in no acute distress   Neck supple without lymphadenopathy, no carotid artery bruits auscultated bilaterally. No thyromegaly   Lungs clear throughout   S1 S2 RRR without ectopy or murmur auscultated. Extremities: no clubbing, cyanosis, peripheral edema   Abdomen - soft, nontender, nondistended, no masses or organomegaly  Integumentary: no rashes   Reviewed vital signs       Diabetic foot exam:     Left Foot:   Visual Exam: normal    Pulse DP: 2+ (normal)   Filament test: normal sensation    Vibratory sensation: normal      Right Foot:   Visual Exam: normal    Pulse DP: 2+ (normal)   Filament test: normal sensation    Vibratory sensation: normal        Assessment/Plan:    ICD-10-CM ICD-9-CM    1. Type 2 diabetes mellitus with hyperglycemia, without long-term current use of insulin ([facility])  E11.65 250.00  DIABETES FOOT EXAM     790.29       2. Essential hypertension  I10 401.9       3. Pure hypercholesterolemia  E78.00 272.0       4. Hypovitaminosis D  E55.9 268.9       5. Obesity, Class III, BMI 40-49.9 (morbid obesity) ([facility])  E66.01 278.01         Orders Placed This Encounter    semaglutide (Ozempic) 0.25 mg or 0.5 mg/dose (2 mg/1.5 ml) subq pen     Begin saxenda as above  Freestyle Ang dispensed and applied   needs further observation, needs to follow diet more regularly  Issues reviewed with him: low cholesterol diet, weight control and daily exercise discussed, all medications, side effects and compliance discussed carefully, and long term diabetic complications discussed. I have discussed the diagnosis with the patient and the intended plan as seen in the above orders.   The patient has received an after-visit summary and questions were answered concerning future plans. I have discussed medication side effects and warnings with the patient as well. Patient agreeable with above plan and verbalizes understanding. Follow-up and Dispositions    Return in about 2 months (around 11/2/2022) for DM, virtual follow up.

## 2022-09-11 DIAGNOSIS — E11.65 TYPE 2 DIABETES MELLITUS WITH HYPERGLYCEMIA, WITHOUT LONG-TERM CURRENT USE OF INSULIN (HCC): ICD-10-CM

## 2022-09-13 RX ORDER — PEN NEEDLE, DIABETIC 30 GX3/16"
NEEDLE, DISPOSABLE MISCELLANEOUS
Qty: 90 EACH | Refills: 3 | Status: SHIPPED | OUTPATIENT
Start: 2022-09-13

## 2022-09-13 RX ORDER — FLASH GLUCOSE SENSOR
KIT MISCELLANEOUS
Qty: 2 KIT | Refills: 5 | Status: SHIPPED | OUTPATIENT
Start: 2022-09-13

## 2022-10-15 RX ORDER — AMLODIPINE AND VALSARTAN 10; 320 MG/1; MG/1
1 TABLET ORAL DAILY
Qty: 90 TABLET | Refills: 3 | Status: SHIPPED | OUTPATIENT
Start: 2022-10-15

## 2022-10-18 RX ORDER — SEMAGLUTIDE 1.34 MG/ML
0.5 INJECTION, SOLUTION SUBCUTANEOUS
Qty: 1 BOX | Refills: 0 | Status: CANCELLED | OUTPATIENT
Start: 2022-10-18

## 2022-10-18 NOTE — TELEPHONE ENCOUNTER
Last Visit: 22 with SANJUANITA Brothers  Next Appointment: 11/3/22 with SANJUANITA Brothers  Previous Refill Encounter(s): 22 #2    Requested Prescriptions     Pending Prescriptions Disp Refills    semaglutide (Ozempic) 0.25 mg or 0.5 mg/dose (2 mg/1.5 ml) subq pen 1 Box 0     Si.5 mg by SubCUTAneous route every seven (7) days. For [address] [facility] in place:   Recommendation Provided To:    Intervention Detail: New Rx: 1, reason: Patient Preference  Gap Closed?:   Intervention Accepted By:   Time Spent (min): 5

## 2022-10-19 RX ORDER — SEMAGLUTIDE 1.34 MG/ML
1 INJECTION, SOLUTION SUBCUTANEOUS
Qty: 3 EACH | Refills: 2 | Status: SHIPPED | OUTPATIENT
Start: 2022-10-19

## 2022-11-03 ENCOUNTER — VIRTUAL VISIT (OUTPATIENT)
Dept: FAMILY MEDICINE CLINIC | Age: 37
End: 2022-11-03
Payer: COMMERCIAL

## 2022-11-03 DIAGNOSIS — E11.21 TYPE 2 DIABETES WITH NEPHROPATHY (HCC): Primary | ICD-10-CM

## 2022-11-03 PROCEDURE — 99213 OFFICE O/P EST LOW 20 MIN: CPT | Performed by: NURSE PRACTITIONER

## 2022-11-03 PROCEDURE — 3046F HEMOGLOBIN A1C LEVEL >9.0%: CPT | Performed by: NURSE PRACTITIONER

## 2022-11-03 RX ORDER — BLOOD SUGAR DIAGNOSTIC
STRIP MISCELLANEOUS
Qty: 200 STRIP | Refills: 3 | Status: SHIPPED | OUTPATIENT
Start: 2022-11-03

## 2022-11-03 NOTE — PROGRESS NOTES
Hailey Cat is a 44 y.o. male who was seen by synchronous (real-time) audio-video technology on 11/3/2022 for Diabetes      Assessment & Plan:   Diagnoses and all orders for this visit:    1. Type 2 diabetes with nephropathy ([facility] [address]       Follow-up and Dispositions    Return in about 3 months (around 2/3/2023) for Diabetes , Fasting Labs 1 Wk Prior, WITH PCP. Subjective: Follow up DM:    Key Antihyperglycemic Medications               semaglutide (Ozempic) 1 mg/dose (4 mg/3 mL) pnij (Taking) 1 mg by SubCUTAneous route every seven (7) days. Lantus Solostar U-100 Insulin 100 unit/mL (3 mL) inpn INJECT SUBCUTANEOUSLY 20  UNITS AT NIGHT    SITagliptin-metFORMIN (JANUMET) 50-1,000 mg per tablet Take 1 Tablet by mouth two (2) times daily (with meals). Key Antihyperlipidemia Meds               fenofibrate nanocrystallized (TRICOR) 145 mg tablet (Taking) TAKE 1 TABLET BY MOUTH  DAILY            New concerns: Pt states that he is eating mostly baked, broiled or air fried chicken and vegetables. Pt has stopped his Janumet, and his Lantus, and is only taking the Ozempic 1 time per week. Pt states that he was told to stop his other medication once he was on the highest dose of Ozempic. Pt was instructed to restart his nightly Lantus at the suggested dose of 25 units and follow-up with his PCP for further direction. Since last visit, he  reports: no significant changes. He reports medication compliance: compliant most of the time. Medication side effects: none.     Diabetic diet compliance: noncompliant some of the time   Activity level:exercises 3 times a week    Home glucoses:  Fastin's-160's  PP lunch: 190's  PP dinner: 190's  Hypoglycemic episodes: N/A    Diabetic Foot and Eye Exam HM Status   Topic Date Due    Eye Exam  2021    Diabetic Foot Care  2023         Lab Results   Component Value Date/Time    Hemoglobin A1c 10.2 (H) 2022 08:01 AM    Hemoglobin A1c (POC) 6.3 02/07/2020 08:14 AM    Hemoglobin A1c, External 8.6 12/27/2019 12:00 AM      No results found for: B12LT, FOL, RBCF    Prior to Admission medications    Medication Sig Start Date End Date Taking? Authorizing Provider   semaglutide (Ozempic) 1 mg/dose (4 mg/3 mL) pnij 1 mg by SubCUTAneous route every seven (7) days. 10/19/22  Yes Kam Faulkner NP   amLODIPine-valsartan (EXFORGE)  mg per tablet TAKE 1 TABLET BY MOUTH  DAILY 10/15/22  Yes Kam Faulkner NP   flash glucose sensor (FreeStyle Gypsum Sonya 2 Sensor) kit Check glucose as directed 9/13/22  Yes Kam Faulkner NP   fenofibrate nanocrystallized (TRICOR) 145 mg tablet TAKE 1 TABLET BY MOUTH  DAILY 8/24/22  Yes Kam Faulkner NP   Vitamin D2 1,250 mcg (50,000 unit) capsule TAKE 1 CAPSULE BY MOUTH  EVERY 7 DAYS 5/12/22  Yes Kam Faulkner NP   hydroCHLOROthiazide (HYDRODIURIL) 25 mg tablet Take 1 Tablet by mouth daily. 1/4/22  Yes Kam Faulkner NP   aspirin 81 mg chewable tablet Take 81 mg by mouth daily. Yes Provider, Historical   Insulin Needles, Disposable, (BD Ultra-Fine Short Pen Needle) 31 gauge x 5/16\" ndle USE DAILY WITH LANTUS  SOLOSTAR  Patient not taking: Reported on 11/3/2022 9/13/22   Alberta SKINNER NP   Lantus Solostar U-100 Insulin 100 unit/mL (3 mL) inpn INJECT SUBCUTANEOUSLY 20  UNITS AT NIGHT  Patient not taking: Reported on 11/3/2022 6/28/22   Alberta SKINNER NP   glucose blood VI test strips (OneTouch Ultra Test) strip Check glucose 2 times daily. Appt required before next fill will be authorized  Patient not taking: Reported on 11/3/2022 6/13/22   Alberta SKINNER NP   tamsulosin (FLOMAX) 0.4 mg capsule TAKE 1 CAPSULE BY MOUTH IN  THE EVENING AFTER DINNER  Patient not taking: Reported on 11/3/2022 6/8/22   OTF Garcia   SITagliptin-metFORMIN (JANUMET) 50-1,000 mg per tablet Take 1 Tablet by mouth two (2) times daily (with meals).  3/3/22   Alberta SKINNER NP   trospium (SANCTURA) 20 mg tablet Take 1 Tablet by mouth two (2) times a day. Patient not taking: No sig reported 8/9/21   Margie Corado MD   glucose blood VI test strips (OneTouch Ultra Blue Test Strip) strip Check glucose 2 times daily  Patient not taking: Reported on 11/3/2022 7/19/21   Williams Whitlock NP   omeprazole (PRILOSEC) 20 mg capsule Take 1 Cap by mouth Daily (before breakfast). Patient not taking: No sig reported 3/11/19   Williams Whitlock NP   Blood-Glucose Meter monitoring kit One touch mini  Patient not taking: Reported on 11/3/2022 9/10/14   Williams Whitlock NP     Patient Active Problem List   Diagnosis Code    Motion sickness T75. 3XXA    Kidney stone N20.0    Diabetes mellitus, type 2 (HCC) E11.9    Cellulitis, scrotum N49.2    Sepsis ([address] A41.9    Cellulitis, perineum L03.315    Hyperglycemia due to type 2 diabetes mellitus ([facility]) E11.65    Hypokalemia E87.6    Obesity, morbid ([facility]) E66.01    Type 2 diabetes with nephropathy ([facility]) E11.21    Right inguinal hernia K40.90     Current Outpatient Medications   Medication Sig Dispense Refill    semaglutide (Ozempic) 1 mg/dose (4 mg/3 mL) pnij 1 mg by SubCUTAneous route every seven (7) days. 3 Each 2    amLODIPine-valsartan (EXFORGE)  mg per tablet TAKE 1 TABLET BY MOUTH  DAILY 90 Tablet 3    flash glucose sensor (FreeStyle Ang 2 Sensor) kit Check glucose as directed 2 Kit 5    fenofibrate nanocrystallized (TRICOR) 145 mg tablet TAKE 1 TABLET BY MOUTH  DAILY 90 Tablet 3    Vitamin D2 1,250 mcg (50,000 unit) capsule TAKE 1 CAPSULE BY MOUTH  EVERY 7 DAYS 13 Capsule 3    hydroCHLOROthiazide (HYDRODIURIL) 25 mg tablet Take 1 Tablet by mouth daily. 90 Tablet 2    aspirin 81 mg chewable tablet Take 81 mg by mouth daily.       Insulin Needles, Disposable, (BD Ultra-Fine Short Pen Needle) 31 gauge x 5/16\" ndle USE DAILY WITH LANTUS  SOLOSTAR (Patient not taking: Reported on 11/3/2022) 90 Each 3    Lantus Solostar U-100 Insulin 100 unit/mL (3 mL) inpn INJECT SUBCUTANEOUSLY 20  UNITS AT NIGHT (Patient not taking: Reported on 11/3/2022) 30 mL 3    glucose blood VI test strips (OneTouch Ultra Test) strip Check glucose 2 times daily. Appt required before next fill will be authorized (Patient not taking: Reported on 11/3/2022) 200 Strip 0    tamsulosin (FLOMAX) 0.4 mg capsule TAKE 1 CAPSULE BY MOUTH IN  THE EVENING AFTER DINNER (Patient not taking: Reported on 11/3/2022) 90 Capsule 3    SITagliptin-metFORMIN (JANUMET) 50-1,000 mg per tablet Take 1 Tablet by mouth two (2) times daily (with meals). 180 Tablet 1    trospium (SANCTURA) 20 mg tablet Take 1 Tablet by mouth two (2) times a day. (Patient not taking: No sig reported) 60 Tablet 11    glucose blood VI test strips (OneTouch Ultra Blue Test Strip) strip Check glucose 2 times daily (Patient not taking: Reported on 11/3/2022) 200 Strip 3    omeprazole (PRILOSEC) 20 mg capsule Take 1 Cap by mouth Daily (before breakfast). (Patient not taking: No sig reported) 30 Cap 1    Blood-Glucose Meter monitoring kit One touch mini (Patient not taking: Reported on 11/3/2022) 1 kit 0     Allergies   Allergen Reactions    Hmxwwnjfd-Rg-Uqxeouif-Guaifen Other (comments)     Pt states it causes urinary retention.      Tylenol Cold [Qtw-Ycrvmlkrj-Ci-Acetaminophen] Swelling     Past Medical History:   Diagnosis Date    Diabetes mellitus, type 2 ([facility] [address] 9/25/2014    GERD (gastroesophageal reflux disease)     Hypertension     Kidney stone 9/26/2012    Kidney stones      Past Surgical History:   Procedure Laterality Date    HX KNEE ARTHROSCOPY      HX UROLOGICAL      kidney stone extraction         Objective:     Patient-Reported Vitals 11/3/2022   Patient-Reported Weight 327 lbs      General: alert, cooperative, no distress   Mental  status: normal mood, behavior, speech, dress, motor activity, and thought processes, able to follow commands   HENT: NCAT   Neck: no visualized mass   Resp: no respiratory distress   Neuro: no gross deficits   Skin: no discoloration or lesions of concern on visible areas   Psychiatric: normal affect, consistent with stated mood, no evidence of hallucinations     Additional exam findings: We discussed the expected course, resolution and complications of the diagnosis(es) in detail. Medication risks, benefits, costs, interactions, and alternatives were discussed as indicated. I advised him to contact the office if his condition worsens, changes or fails to improve as anticipated. He expressed understanding with the diagnosis(es) and plan. Carol Loyola, was evaluated through a synchronous (real-time) audio-video encounter. The patient (or guardian if applicable) is aware that this is a billable service, which includes applicable co-pays. Verbal consent to proceed has been obtained. The visit was conducted pursuant to the emergency declaration under the [address] [place] [address] and the [employer] Act. Patient identification was verified, and a caregiver was present when appropriate. The patient was located at home in a state where the provider was licensed to provide care. Carol Loyola, was evaluated through a synchronous (real-time) audio-video encounter. The patient (or guardian if applicable) is aware that this is a billable service, which includes applicable co-pays. This Virtual Visit was conducted with patient's (and/or legal guardian's) consent. The visit was conducted pursuant to the emergency declaration under the [address] [place] [address] and the [employer] Act. Patient identification was verified, and a caregiver was present when appropriate. The patient was located at: Home: [address] [facility] [address] [facility]   The provider was located at:  [id number]       Aspects of this note may have been generated using voice recognition software. Despite editing, there may be unrecognized errors.      Borden Vivienne, SANJUANITA  11/3/2022

## 2023-06-06 ENCOUNTER — TELEPHONE (OUTPATIENT)
Age: 38
End: 2023-06-06

## 2023-06-06 DIAGNOSIS — E78.00 PURE HYPERCHOLESTEROLEMIA, UNSPECIFIED: ICD-10-CM

## 2023-06-06 DIAGNOSIS — Z11.59 NEED FOR HEPATITIS C SCREENING TEST: ICD-10-CM

## 2023-06-06 DIAGNOSIS — I10 ESSENTIAL (PRIMARY) HYPERTENSION: ICD-10-CM

## 2023-06-06 DIAGNOSIS — E11.21 TYPE 2 DIABETES MELLITUS WITH DIABETIC NEPHROPATHY, UNSPECIFIED WHETHER LONG TERM INSULIN USE (HCC): Primary | ICD-10-CM

## (undated) DEVICE — SUTURE V-LOC 180 SZ 0 L9IN ABSRB GRN GS-21 L37MM 1/2 CIR VLOCL0346

## (undated) DEVICE — COLUMN DRAPE

## (undated) DEVICE — BLADELESS OBTURATOR: Brand: WECK VISTA

## (undated) DEVICE — CARTRIDGE CLP LIG HEMLOK GRN --

## (undated) DEVICE — BANDAGE ADH W0.75XL3IN UNIV WVN FAB NAT GEN USE STRP N ADH

## (undated) DEVICE — KIT CLN UP BON SECOURS MARYV

## (undated) DEVICE — INTENDED FOR TISSUE SEPARATION, AND OTHER PROCEDURES THAT REQUIRE A SHARP SURGICAL BLADE TO PUNCTURE OR CUT.: Brand: BARD-PARKER ®  SAFETY SCALPED

## (undated) DEVICE — STERILE POLYISOPRENE POWDER-FREE SURGICAL GLOVES: Brand: PROTEXIS

## (undated) DEVICE — 3M™ IOBAN™ 2 ANTIMICROBIAL INCISE DRAPE 6651EZ: Brand: IOBAN™ 2

## (undated) DEVICE — APPLICATOR SCRB 26ML TEAL STRL -- CHLORAPREP 26ML

## (undated) DEVICE — Device

## (undated) DEVICE — SYR 10ML LUER LOK 1/5ML GRAD --

## (undated) DEVICE — SEAL UNIV 5-8MM DISP BX/10 -- DA VINCI XI - SNGL USE

## (undated) DEVICE — SUTURE MCRYL SZ 4-0 L27IN ABSRB UD L24MM PS-1 3/8 CIR PRIM Y935H

## (undated) DEVICE — GLOVE SURG SZ 8 L11.77IN FNGR THK9.8MIL STRW LTX POLYMER

## (undated) DEVICE — TRAY PREP DRY W/ PREM GLV 2 APPL 6 SPNG 2 UNDPD 1 OVERWRAP

## (undated) DEVICE — STRIP,CLOSURE,WOUND,MEDI-STRIP,1/2X4: Brand: MEDLINE

## (undated) DEVICE — DRAPE TOWEL: Brand: CONVERTORS

## (undated) DEVICE — ARM DRAPE

## (undated) DEVICE — REM POLYHESIVE ADULT PATIENT RETURN ELECTRODE: Brand: VALLEYLAB

## (undated) DEVICE — SOLUTION IV 1000ML 0.9% SOD CHL

## (undated) DEVICE — TRAY,URINE METER,100% SILICONE,16FR10ML: Brand: MEDLINE

## (undated) DEVICE — SUTURE VCRL SZ 2-0 L27IN ABSRB VLT L26MM CT-2 1/2 CIR J333H

## (undated) DEVICE — SOFT SILICONE HYDROCELLULAR SACRUM DRESSING WITH LOCK AWAY LAYER: Brand: ALLEVYN LIFE SACRUM (LARGE) PACK OF 10

## (undated) DEVICE — TIP COVER ACCESSORY

## (undated) DEVICE — NEEDLE HYPO 25GA L1.5IN BVL ORIENTED ECLIPSE

## (undated) DEVICE — GLOVE SURG BIOGEL 8.0 STRL -- SKINSENSE

## (undated) DEVICE — MASTISOL ADHESIVE LIQ 2/3ML

## (undated) DEVICE — GARMENT,MEDLINE,DVT,INT,CALF,LG, GEN2: Brand: MEDLINE

## (undated) DEVICE — BLANKET WRM AD W50XL85.8IN PACU FULL BODY FORC AIR

## (undated) DEVICE — ELECTRO LUBE IS A SINGLE PATIENT USE DEVICE THAT IS INTENDED TO BE USED ON ELECTROSURGICAL ELECTRODES TO REDUCE STICKING.: Brand: KEY SURGICAL ELECTRO LUBE

## (undated) DEVICE — COVER LT HNDL BLU STRL -- MEDICHOICE

## (undated) DEVICE — MAYO STAND COVER: Brand: CONVERTORS